# Patient Record
Sex: FEMALE | Race: WHITE | Employment: OTHER | URBAN - METROPOLITAN AREA
[De-identification: names, ages, dates, MRNs, and addresses within clinical notes are randomized per-mention and may not be internally consistent; named-entity substitution may affect disease eponyms.]

---

## 2017-03-15 ENCOUNTER — TELEPHONE (OUTPATIENT)
Dept: FAMILY MEDICINE CLINIC | Age: 62
End: 2017-03-15

## 2017-03-15 RX ORDER — RAMIPRIL 10 MG/1
CAPSULE ORAL
Qty: 30 CAPSULE | Refills: 5 | Status: SHIPPED | OUTPATIENT
Start: 2017-03-15 | End: 2017-09-12 | Stop reason: SDUPTHER

## 2017-03-24 RX ORDER — ALPRAZOLAM 0.5 MG/1
TABLET ORAL
Qty: 45 TABLET | Refills: 0 | OUTPATIENT
Start: 2017-03-24 | End: 2017-04-18 | Stop reason: SDUPTHER

## 2017-03-24 RX ORDER — ALPRAZOLAM 0.5 MG/1
TABLET ORAL
Qty: 45 TABLET | Refills: 0 | OUTPATIENT
Start: 2017-03-24

## 2017-03-24 RX ORDER — TRAMADOL HYDROCHLORIDE 50 MG/1
TABLET ORAL
Qty: 180 TABLET | Refills: 0 | OUTPATIENT
Start: 2017-03-24 | End: 2017-04-18 | Stop reason: SDUPTHER

## 2017-04-10 DIAGNOSIS — I10 ESSENTIAL HYPERTENSION: Primary | ICD-10-CM

## 2017-04-10 DIAGNOSIS — E16.2 HYPOGLYCEMIA: ICD-10-CM

## 2017-04-10 DIAGNOSIS — Z00.00 EXAMINATION, MEDICAL, GENERAL: ICD-10-CM

## 2017-04-12 RX ORDER — METOPROLOL SUCCINATE 25 MG/1
TABLET, EXTENDED RELEASE ORAL
Qty: 60 TABLET | Refills: 2 | Status: SHIPPED | OUTPATIENT
Start: 2017-04-12 | End: 2017-07-13 | Stop reason: SDUPTHER

## 2017-04-18 ENCOUNTER — OFFICE VISIT (OUTPATIENT)
Dept: FAMILY MEDICINE CLINIC | Age: 62
End: 2017-04-18

## 2017-04-18 VITALS
DIASTOLIC BLOOD PRESSURE: 86 MMHG | OXYGEN SATURATION: 97 % | HEART RATE: 75 BPM | SYSTOLIC BLOOD PRESSURE: 122 MMHG | WEIGHT: 193 LBS | BODY MASS INDEX: 28.92 KG/M2

## 2017-04-18 DIAGNOSIS — E78.5 HYPERLIPIDEMIA, UNSPECIFIED HYPERLIPIDEMIA TYPE: ICD-10-CM

## 2017-04-18 DIAGNOSIS — L98.9 SKIN LESION: ICD-10-CM

## 2017-04-18 DIAGNOSIS — I10 ESSENTIAL HYPERTENSION: Primary | ICD-10-CM

## 2017-04-18 DIAGNOSIS — G47.00 INSOMNIA, UNSPECIFIED TYPE: ICD-10-CM

## 2017-04-18 DIAGNOSIS — M79.7 FIBROMYALGIA: ICD-10-CM

## 2017-04-18 DIAGNOSIS — Z12.9 CANCER SCREENING: ICD-10-CM

## 2017-04-18 DIAGNOSIS — N95.9 MENOPAUSAL AND POSTMENOPAUSAL DISORDER: ICD-10-CM

## 2017-04-18 PROCEDURE — 99214 OFFICE O/P EST MOD 30 MIN: CPT | Performed by: FAMILY MEDICINE

## 2017-04-18 RX ORDER — TRAMADOL HYDROCHLORIDE 50 MG/1
TABLET ORAL
Qty: 180 TABLET | Refills: 0 | Status: SHIPPED | OUTPATIENT
Start: 2017-04-18 | End: 2017-05-15 | Stop reason: SDUPTHER

## 2017-04-18 RX ORDER — GABAPENTIN 300 MG/1
300 CAPSULE ORAL 3 TIMES DAILY
Qty: 270 CAPSULE | Refills: 3 | Status: SHIPPED | OUTPATIENT
Start: 2017-04-18 | End: 2018-07-10 | Stop reason: SDUPTHER

## 2017-04-18 RX ORDER — ALPRAZOLAM 0.5 MG/1
TABLET ORAL
Qty: 45 TABLET | Refills: 2 | Status: SHIPPED | OUTPATIENT
Start: 2017-04-18 | End: 2017-09-03 | Stop reason: SDUPTHER

## 2017-04-20 ENCOUNTER — TELEPHONE (OUTPATIENT)
Dept: FAMILY MEDICINE CLINIC | Age: 62
End: 2017-04-20

## 2017-05-15 DIAGNOSIS — M79.7 FIBROMYALGIA: ICD-10-CM

## 2017-05-15 RX ORDER — TRAMADOL HYDROCHLORIDE 50 MG/1
TABLET ORAL
Qty: 180 TABLET | Refills: 0 | OUTPATIENT
Start: 2017-05-15 | End: 2017-06-12 | Stop reason: SDUPTHER

## 2017-07-10 ENCOUNTER — TELEPHONE (OUTPATIENT)
Dept: OTHER | Facility: CLINIC | Age: 62
End: 2017-07-10

## 2017-07-13 RX ORDER — METOPROLOL SUCCINATE 25 MG/1
TABLET, EXTENDED RELEASE ORAL
Qty: 60 TABLET | Refills: 2 | Status: SHIPPED | OUTPATIENT
Start: 2017-07-13 | End: 2017-10-30 | Stop reason: SDUPTHER

## 2017-07-14 ENCOUNTER — TELEPHONE (OUTPATIENT)
Dept: FAMILY MEDICINE CLINIC | Age: 62
End: 2017-07-14

## 2017-07-14 RX ORDER — FUROSEMIDE 80 MG
TABLET ORAL
Qty: 30 TABLET | Refills: 11 | Status: SHIPPED | OUTPATIENT
Start: 2017-07-14 | End: 2018-07-06 | Stop reason: SDUPTHER

## 2017-07-14 RX ORDER — ATORVASTATIN CALCIUM 20 MG/1
TABLET, FILM COATED ORAL
Qty: 30 TABLET | Refills: 11 | Status: SHIPPED | OUTPATIENT
Start: 2017-07-14 | End: 2018-03-20

## 2017-08-01 ENCOUNTER — TELEPHONE (OUTPATIENT)
Dept: FAMILY MEDICINE CLINIC | Age: 62
End: 2017-08-01

## 2017-08-28 DIAGNOSIS — M79.7 FIBROMYALGIA: ICD-10-CM

## 2017-08-29 RX ORDER — TRAMADOL HYDROCHLORIDE 50 MG/1
TABLET ORAL
Qty: 180 TABLET | Refills: 0 | OUTPATIENT
Start: 2017-08-29 | End: 2017-09-28 | Stop reason: SDUPTHER

## 2017-09-12 RX ORDER — RAMIPRIL 10 MG/1
CAPSULE ORAL
Qty: 30 CAPSULE | Refills: 3 | Status: SHIPPED | OUTPATIENT
Start: 2017-09-12 | End: 2018-01-09 | Stop reason: SDUPTHER

## 2017-10-03 ENCOUNTER — TELEPHONE (OUTPATIENT)
Dept: FAMILY MEDICINE CLINIC | Age: 62
End: 2017-10-03

## 2017-10-03 NOTE — TELEPHONE ENCOUNTER
Please advise status    ALPRAZolam (XANAX) 0.5 MG tablet       traMADol (ULTRAM) 50 MG tablet       [facility] in chart

## 2017-10-05 ENCOUNTER — TELEPHONE (OUTPATIENT)
Dept: FAMILY MEDICINE CLINIC | Age: 62
End: 2017-10-05

## 2017-11-01 ENCOUNTER — TELEPHONE (OUTPATIENT)
Dept: FAMILY MEDICINE CLINIC | Age: 62
End: 2017-11-01

## 2017-11-01 NOTE — TELEPHONE ENCOUNTER
Patient called to request a refill of    traMADol (ULTRAM) 50 MG tablet [[id number]]    ALPRAZolam (XANAX) 0.5 MG tablet [[id number]]    Please call into [facility] on [address] in [place]. If she has to  the script, please call her at [phone]. You can leave a message, because works night shift and sleeps during the day.

## 2017-12-02 DIAGNOSIS — G47.00 INSOMNIA, UNSPECIFIED TYPE: ICD-10-CM

## 2017-12-02 DIAGNOSIS — M79.7 FIBROMYALGIA: ICD-10-CM

## 2017-12-04 ENCOUNTER — TELEPHONE (OUTPATIENT)
Dept: FAMILY MEDICINE CLINIC | Age: 62
End: 2017-12-04

## 2017-12-04 RX ORDER — ALPRAZOLAM 0.5 MG/1
TABLET ORAL
Qty: 45 TABLET | Refills: 0 | Status: SHIPPED | OUTPATIENT
Start: 2017-12-04 | End: 2018-01-02 | Stop reason: SDUPTHER

## 2017-12-04 RX ORDER — TRAMADOL HYDROCHLORIDE 50 MG/1
TABLET ORAL
Qty: 180 TABLET | Refills: 0 | Status: SHIPPED | OUTPATIENT
Start: 2017-12-04 | End: 2018-01-02 | Stop reason: SDUPTHER

## 2017-12-06 PROBLEM — F17.200 TOBACCO DEPENDENCE: Status: ACTIVE | Noted: 2017-12-06

## 2018-01-02 ENCOUNTER — TELEPHONE (OUTPATIENT)
Dept: FAMILY MEDICINE CLINIC | Age: 63
End: 2018-01-02

## 2018-01-02 DIAGNOSIS — G47.00 INSOMNIA, UNSPECIFIED TYPE: ICD-10-CM

## 2018-01-02 DIAGNOSIS — M79.7 FIBROMYALGIA: ICD-10-CM

## 2018-01-02 RX ORDER — TRAMADOL HYDROCHLORIDE 50 MG/1
TABLET ORAL
Qty: 180 TABLET | Refills: 0 | Status: CANCELLED | OUTPATIENT
Start: 2018-01-02

## 2018-01-02 RX ORDER — ALPRAZOLAM 0.5 MG/1
TABLET ORAL
Qty: 45 TABLET | Refills: 0 | Status: SHIPPED | OUTPATIENT
Start: 2018-01-02 | End: 2018-02-06 | Stop reason: SDUPTHER

## 2018-01-02 RX ORDER — ALPRAZOLAM 0.5 MG/1
TABLET ORAL
Qty: 45 TABLET | Refills: 0 | Status: CANCELLED | OUTPATIENT
Start: 2018-01-02

## 2018-01-02 RX ORDER — TRAMADOL HYDROCHLORIDE 50 MG/1
TABLET ORAL
Qty: 180 TABLET | Refills: 0 | Status: SHIPPED | OUTPATIENT
Start: 2018-01-02 | End: 2018-02-06 | Stop reason: SDUPTHER

## 2018-01-02 NOTE — TELEPHONE ENCOUNTER
traMADol (ULTRAM) 50 MG tablet 180 tablet 0 12/4/2017     Sig: TAKE 2 TABLETS BY MOUTH THREE TIMES DAILY AS NEEDED      ALPRAZolam (XANAX) 0.5 MG tablet 45 tablet 0 12/4/2017     Sig: TAKE 1/2 TABLET BY MOUTH EVERY MORNING AND 1 TABLET BY MOUTH EVERY EVENING      If can send please send to [facility] in chart

## 2018-01-05 ENCOUNTER — TELEPHONE (OUTPATIENT)
Dept: FAMILY MEDICINE CLINIC | Age: 63
End: 2018-01-05

## 2018-01-05 NOTE — TELEPHONE ENCOUNTER
Pt states [facility] will not fill her prescription    Pt is needing a prior authorization to be sent to [facility] for Tramadol 50mg tab

## 2018-02-02 ENCOUNTER — TELEPHONE (OUTPATIENT)
Dept: FAMILY MEDICINE CLINIC | Age: 63
End: 2018-02-02

## 2018-02-06 DIAGNOSIS — G47.00 INSOMNIA, UNSPECIFIED TYPE: ICD-10-CM

## 2018-02-06 DIAGNOSIS — M79.7 FIBROMYALGIA: ICD-10-CM

## 2018-02-06 RX ORDER — TRAMADOL HYDROCHLORIDE 50 MG/1
TABLET ORAL
Qty: 180 TABLET | Refills: 0 | Status: SHIPPED | OUTPATIENT
Start: 2018-02-06 | End: 2018-03-06 | Stop reason: SDUPTHER

## 2018-02-06 RX ORDER — ALPRAZOLAM 0.5 MG/1
TABLET ORAL
Qty: 45 TABLET | Refills: 0 | Status: SHIPPED | OUTPATIENT
Start: 2018-02-06 | End: 2018-03-06 | Stop reason: SDUPTHER

## 2018-03-05 ENCOUNTER — TELEPHONE (OUTPATIENT)
Dept: FAMILY MEDICINE CLINIC | Age: 63
End: 2018-03-05

## 2018-03-05 DIAGNOSIS — G47.00 INSOMNIA, UNSPECIFIED TYPE: ICD-10-CM

## 2018-03-05 DIAGNOSIS — M79.7 FIBROMYALGIA: ICD-10-CM

## 2018-03-05 NOTE — TELEPHONE ENCOUNTER
Patient requesting refills.   Patient has an appointment on 6/5/18    ALPRAZolam (XANAX) 0.5 MG tablet 45 tablet 0 2/6/2018 2/5/2019    Sig: TAKE 1/2 TABLET BY MOUTH EVERY MORNING AND 1 TABLET BY MOUTH EVERY EVENING      traMADol (ULTRAM) 50 MG tablet 180 tablet 0 2/6/2018 2/5/2019    Sig: TAKE 2 TABLETS BY MOUTH THREE TIMES DAILY AS NEEDED-  patient will need follow-up appointment for additional refills      Pharmacy:  [facility] [address] [place] [address] [phone]

## 2018-03-06 RX ORDER — TRAMADOL HYDROCHLORIDE 50 MG/1
TABLET ORAL
Qty: 180 TABLET | Refills: 0 | Status: SHIPPED | OUTPATIENT
Start: 2018-03-06 | End: 2018-04-10 | Stop reason: SDUPTHER

## 2018-03-06 RX ORDER — ALPRAZOLAM 0.5 MG/1
TABLET ORAL
Qty: 45 TABLET | Refills: 0 | Status: SHIPPED | OUTPATIENT
Start: 2018-03-06 | End: 2018-04-10 | Stop reason: SDUPTHER

## 2018-03-08 DIAGNOSIS — I10 HYPERTENSION, UNSPECIFIED TYPE: Primary | ICD-10-CM

## 2018-03-08 DIAGNOSIS — E78.5 HYPERLIPIDEMIA, UNSPECIFIED HYPERLIPIDEMIA TYPE: ICD-10-CM

## 2018-03-20 ENCOUNTER — OFFICE VISIT (OUTPATIENT)
Dept: FAMILY MEDICINE CLINIC | Age: 63
End: 2018-03-20

## 2018-03-20 VITALS
WEIGHT: 188 LBS | SYSTOLIC BLOOD PRESSURE: 128 MMHG | DIASTOLIC BLOOD PRESSURE: 82 MMHG | BODY MASS INDEX: 28.49 KG/M2 | HEIGHT: 68 IN | HEART RATE: 64 BPM | OXYGEN SATURATION: 97 %

## 2018-03-20 DIAGNOSIS — M54.50 CHRONIC LOW BACK PAIN WITHOUT SCIATICA, UNSPECIFIED BACK PAIN LATERALITY: ICD-10-CM

## 2018-03-20 DIAGNOSIS — M79.7 FIBROMYALGIA: ICD-10-CM

## 2018-03-20 DIAGNOSIS — I10 HYPERTENSION, UNSPECIFIED TYPE: Primary | ICD-10-CM

## 2018-03-20 DIAGNOSIS — G89.29 CHRONIC LOW BACK PAIN WITHOUT SCIATICA, UNSPECIFIED BACK PAIN LATERALITY: ICD-10-CM

## 2018-03-20 DIAGNOSIS — F17.200 TOBACCO DEPENDENCE: ICD-10-CM

## 2018-03-20 DIAGNOSIS — M54.9 MID BACK PAIN: ICD-10-CM

## 2018-03-20 DIAGNOSIS — M54.2 NECK PAIN: ICD-10-CM

## 2018-03-20 PROCEDURE — 99214 OFFICE O/P EST MOD 30 MIN: CPT | Performed by: FAMILY MEDICINE

## 2018-03-20 RX ORDER — BUPROPION HYDROCHLORIDE 150 MG/1
TABLET, EXTENDED RELEASE ORAL
Qty: 60 TABLET | Refills: 2 | Status: SHIPPED | OUTPATIENT
Start: 2018-03-20 | End: 2018-06-23 | Stop reason: SDUPTHER

## 2018-03-20 ASSESSMENT — ENCOUNTER SYMPTOMS
COUGH: 0
SHORTNESS OF BREATH: 0
BACK PAIN: 1

## 2018-03-20 NOTE — PROGRESS NOTES
Deidra Whitt  : 1955  Encounter date: 3/20/2018    This is a 60 y.o. female who presents to [facility] care. Chief Complaint   Patient presents with    3 Month Follow-Up     Patient has not taken atorvastatin for last month because of leg cramps. 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 Is on feet all the time. Also works at [employer] with less walking around which is easier on her. Leg cramps bother her. Initially thought from her activity level. Didn't go away with less activity. Intense pain that wakes her from sleep. Rubs aspercreme on legs which helps. Does have varicose veins, wears support stockings. Works 3 days a week. Backed off lipitor and cramps went away; came back when she restarted. Stopped lipitor about a month ago. Works night shift; she is good about staying hydrated. When she started tramadol it was from rheum for fibromyalgia for pain that \"moves\". Can start in back and radiate up or shoulders and radiate down. Had been getting worse through years. \"I don't want to take narcotics because then I can't work\". Was dx initially as having gout (initially elbows both swollen). Mostly pain is in back. Pain is sharp -like someone is stabbing her. Takes 2 tramadol when she takes it. Takes it along with tylenol about TID when she is working and spaces 6-8 hours apart. Gabapentin seems to help with pain in her neck. Helps with fingers tingling. Saw neurosurg and told her it wasn't surgical. Has done PT for this. Seems to be better as long as she is staying busy. Lifting, pulling she just uses caution to not worsen this. Tramadol eases pain. Has had injections in the past as well. Does feel like upper back has worsened in the last couple of years. At worst pain is 8/10. Tramadol brings it to 3-4/10. Helps her be able to function; do things around house; complete her job. Pain all worse in certain rainy weather/pressure changes.  (has History   Problem Relation Age of Onset    Diabetes Mother          Review of Systems   Constitutional: Negative for chills, fatigue and fever. Respiratory: Negative for cough and shortness of breath. Musculoskeletal: Positive for arthralgias, back pain, neck pain and neck stiffness. Neurological: Positive for numbness. Psychiatric/Behavioral: The patient is nervous/anxious. Objective:    /82 (Site: Left Arm, Position: Sitting, Cuff Size: Large Adult)   Pulse 64   Ht 5' 8\" (1.727 m)   Wt 188 lb (85.3 kg)   SpO2 97%   BMI 28.59 kg/m²   Weight: 188 lb (85.3 kg)     BP Readings from Last 3 Encounters:   03/20/18 128/82   12/06/17 (!) 164/88   10/05/17 130/80     Wt Readings from Last 3 Encounters:   03/20/18 188 lb (85.3 kg)   12/06/17 189 lb (85.7 kg)   08/01/17 193 lb (87.5 kg)       Physical Exam   Constitutional: She is oriented to person, place, and time. She appears well-developed and well-nourished. Neck: Neck supple. No thyromegaly present. Cardiovascular: Normal rate, regular rhythm and normal heart sounds. No murmur heard. 2+pedal pulses; no LE edema   Pulmonary/Chest: Effort normal. No respiratory distress. She has decreased breath sounds. She has no wheezes. She has no rales. Abdominal: Soft. Bowel sounds are normal.   Musculoskeletal:        Cervical back: She exhibits bony tenderness. Thoracic back: She exhibits bony tenderness. Lumbar back: She exhibits spasm. Lymphadenopathy:     She has no cervical adenopathy. Neurological: She is alert and oriented to person, place, and time. Reflex Scores:       Tricep reflexes are 2+ on the right side and 2+ on the left side. Bicep reflexes are 2+ on the right side and 2+ on the left side. Brachioradialis reflexes are 2+ on the right side and 2+ on the left side. Patellar reflexes are 2+ on the right side and 2+ on the left side.        Achilles reflexes are 2+ on the right side and 2+ on the

## 2018-03-22 DIAGNOSIS — E78.00 ELEVATED CHOLESTEROL: Primary | ICD-10-CM

## 2018-03-22 RX ORDER — ATORVASTATIN CALCIUM 20 MG/1
20 TABLET, FILM COATED ORAL DAILY
Qty: 30 TABLET | Refills: 3 | Status: SHIPPED | OUTPATIENT
Start: 2018-03-22 | End: 2018-06-05 | Stop reason: SINTOL

## 2018-03-26 ENCOUNTER — TELEPHONE (OUTPATIENT)
Dept: FAMILY MEDICINE CLINIC | Age: 63
End: 2018-03-26

## 2018-03-26 NOTE — TELEPHONE ENCOUNTER
D/c Lipitor. Change to pravastatin 20mg daily. #30 with 3 refills until we see if this will improve muscle cramps.

## 2018-03-26 NOTE — TELEPHONE ENCOUNTER
atorvastatin (LIPITOR) 20 MG tablet 30 tablet 3 3/22/2018     Sig - Route: Take 1 tablet by mouth daily - Oral      Above med was sent in error pt was advised it was going to be changed as below     Notes Recorded by Maggi Campbell MD on 3/22/2018 at 8:17 AM EDT  Cholesterol has doubled in the month that she has stopped her lipitor.  If she would like to try pravastatin as we discussed, she could try at 20mg dose to start and see if she restarts with cramping (then I wouldn't try statins again) OR; we can revisit discussion in 3 months at her next C[facility].            [facility] in chart     Please cancel and send new script

## 2018-03-26 NOTE — TELEPHONE ENCOUNTER
Patient returned call advised of notes below. Confirmed pharmacy with patient as [facility] in chart. RX for Pravastatin 20 mg daily, #30 3 refills sent to pharmacy.

## 2018-03-29 RX ORDER — PRAVASTATIN SODIUM 20 MG
20 TABLET ORAL EVERY EVENING
Qty: 30 TABLET | Refills: 3 | Status: SHIPPED | OUTPATIENT
Start: 2018-03-29 | End: 2018-07-22 | Stop reason: SDUPTHER

## 2018-04-10 DIAGNOSIS — G47.00 INSOMNIA, UNSPECIFIED TYPE: ICD-10-CM

## 2018-04-10 DIAGNOSIS — M79.7 FIBROMYALGIA: ICD-10-CM

## 2018-04-13 RX ORDER — ALPRAZOLAM 0.5 MG/1
TABLET ORAL
Qty: 45 TABLET | Refills: 0 | Status: SHIPPED | OUTPATIENT
Start: 2018-04-13 | End: 2018-05-15 | Stop reason: SDUPTHER

## 2018-04-13 RX ORDER — TRAMADOL HYDROCHLORIDE 50 MG/1
TABLET ORAL
Qty: 180 TABLET | Refills: 0 | Status: SHIPPED | OUTPATIENT
Start: 2018-04-13 | End: 2018-05-15 | Stop reason: SDUPTHER

## 2018-05-15 ENCOUNTER — TELEPHONE (OUTPATIENT)
Dept: FAMILY MEDICINE CLINIC | Age: 63
End: 2018-05-15

## 2018-05-15 DIAGNOSIS — M79.7 FIBROMYALGIA: ICD-10-CM

## 2018-05-15 DIAGNOSIS — G47.00 INSOMNIA, UNSPECIFIED TYPE: ICD-10-CM

## 2018-05-15 RX ORDER — TRAMADOL HYDROCHLORIDE 50 MG/1
TABLET ORAL
Qty: 180 TABLET | Refills: 0 | Status: SHIPPED | OUTPATIENT
Start: 2018-05-15 | End: 2018-06-13 | Stop reason: SDUPTHER

## 2018-05-15 RX ORDER — ALPRAZOLAM 0.5 MG/1
TABLET ORAL
Qty: 45 TABLET | Refills: 0 | Status: SHIPPED | OUTPATIENT
Start: 2018-05-15 | End: 2018-06-13 | Stop reason: SDUPTHER

## 2018-06-05 ENCOUNTER — OFFICE VISIT (OUTPATIENT)
Dept: FAMILY MEDICINE CLINIC | Age: 63
End: 2018-06-05

## 2018-06-05 VITALS
SYSTOLIC BLOOD PRESSURE: 138 MMHG | DIASTOLIC BLOOD PRESSURE: 78 MMHG | BODY MASS INDEX: 29.07 KG/M2 | HEART RATE: 64 BPM | WEIGHT: 191.2 LBS | OXYGEN SATURATION: 96 %

## 2018-06-05 DIAGNOSIS — E78.5 HYPERLIPIDEMIA, UNSPECIFIED HYPERLIPIDEMIA TYPE: ICD-10-CM

## 2018-06-05 DIAGNOSIS — M54.40 CHRONIC LOW BACK PAIN WITH SCIATICA, SCIATICA LATERALITY UNSPECIFIED, UNSPECIFIED BACK PAIN LATERALITY: Primary | Chronic | ICD-10-CM

## 2018-06-05 DIAGNOSIS — G89.29 CHRONIC LOW BACK PAIN WITH SCIATICA, SCIATICA LATERALITY UNSPECIFIED, UNSPECIFIED BACK PAIN LATERALITY: Primary | Chronic | ICD-10-CM

## 2018-06-05 DIAGNOSIS — Z12.31 ENCOUNTER FOR SCREENING MAMMOGRAM FOR BREAST CANCER: ICD-10-CM

## 2018-06-05 DIAGNOSIS — Z78.0 POST-MENOPAUSAL: ICD-10-CM

## 2018-06-05 DIAGNOSIS — M19.91 PRIMARY OSTEOARTHRITIS, UNSPECIFIED SITE: ICD-10-CM

## 2018-06-05 DIAGNOSIS — F17.200 TOBACCO DEPENDENCE: ICD-10-CM

## 2018-06-05 DIAGNOSIS — I10 HYPERTENSION, UNSPECIFIED TYPE: ICD-10-CM

## 2018-06-05 DIAGNOSIS — Z23 NEED FOR TDAP VACCINATION: ICD-10-CM

## 2018-06-05 PROCEDURE — 99214 OFFICE O/P EST MOD 30 MIN: CPT | Performed by: FAMILY MEDICINE

## 2018-06-05 PROCEDURE — 90715 TDAP VACCINE 7 YRS/> IM: CPT | Performed by: FAMILY MEDICINE

## 2018-06-05 PROCEDURE — 90471 IMMUNIZATION ADMIN: CPT | Performed by: FAMILY MEDICINE

## 2018-06-05 RX ORDER — NICOTINE 21 MG/24HR
1 PATCH, TRANSDERMAL 24 HOURS TRANSDERMAL EVERY 24 HOURS
Qty: 30 PATCH | Refills: 0 | Status: SHIPPED | OUTPATIENT
Start: 2018-06-05 | End: 2018-09-06

## 2018-06-05 RX ORDER — BUPROPION HYDROCHLORIDE 150 MG/1
150 TABLET, EXTENDED RELEASE ORAL 2 TIMES DAILY
Qty: 60 TABLET | Refills: 2 | Status: SHIPPED | OUTPATIENT
Start: 2018-06-05 | End: 2019-07-03

## 2018-06-05 ASSESSMENT — PATIENT HEALTH QUESTIONNAIRE - PHQ9
SUM OF ALL RESPONSES TO PHQ9 QUESTIONS 1 & 2: 0
SUM OF ALL RESPONSES TO PHQ QUESTIONS 1-9: 0
1. LITTLE INTEREST OR PLEASURE IN DOING THINGS: 0
2. FEELING DOWN, DEPRESSED OR HOPELESS: 0

## 2018-06-05 ASSESSMENT — ENCOUNTER SYMPTOMS
SHORTNESS OF BREATH: 0
COUGH: 0
BACK PAIN: 1

## 2018-06-13 DIAGNOSIS — G47.00 INSOMNIA, UNSPECIFIED TYPE: ICD-10-CM

## 2018-06-13 DIAGNOSIS — M79.7 FIBROMYALGIA: ICD-10-CM

## 2018-06-14 RX ORDER — TRAMADOL HYDROCHLORIDE 50 MG/1
TABLET ORAL
Qty: 180 TABLET | Refills: 0 | Status: SHIPPED | OUTPATIENT
Start: 2018-06-14 | End: 2018-07-18 | Stop reason: SDUPTHER

## 2018-06-14 RX ORDER — ALPRAZOLAM 0.5 MG/1
TABLET ORAL
Qty: 45 TABLET | Refills: 0 | Status: SHIPPED | OUTPATIENT
Start: 2018-06-14 | End: 2018-07-18 | Stop reason: SDUPTHER

## 2018-06-26 RX ORDER — BUPROPION HYDROCHLORIDE 150 MG/1
TABLET, EXTENDED RELEASE ORAL
Qty: 60 TABLET | Refills: 2 | Status: SHIPPED | OUTPATIENT
Start: 2018-06-26 | End: 2018-10-08 | Stop reason: SDUPTHER

## 2018-07-10 DIAGNOSIS — M79.7 FIBROMYALGIA: ICD-10-CM

## 2018-07-11 RX ORDER — GABAPENTIN 300 MG/1
300 CAPSULE ORAL 3 TIMES DAILY
Qty: 270 CAPSULE | Refills: 3 | Status: SHIPPED | OUTPATIENT
Start: 2018-07-11 | End: 2019-06-25 | Stop reason: SDUPTHER

## 2018-07-18 DIAGNOSIS — M79.7 FIBROMYALGIA: ICD-10-CM

## 2018-07-18 DIAGNOSIS — G47.00 INSOMNIA, UNSPECIFIED TYPE: ICD-10-CM

## 2018-07-19 RX ORDER — TRAMADOL HYDROCHLORIDE 50 MG/1
TABLET ORAL
Qty: 180 TABLET | Refills: 0 | Status: SHIPPED | OUTPATIENT
Start: 2018-07-19 | End: 2018-08-21 | Stop reason: SDUPTHER

## 2018-07-19 RX ORDER — ALPRAZOLAM 0.5 MG/1
TABLET ORAL
Qty: 45 TABLET | Refills: 0 | Status: SHIPPED | OUTPATIENT
Start: 2018-07-19 | End: 2018-08-21 | Stop reason: SDUPTHER

## 2018-07-23 RX ORDER — PRAVASTATIN SODIUM 20 MG
20 TABLET ORAL EVERY EVENING
Qty: 30 TABLET | Refills: 0 | Status: SHIPPED | OUTPATIENT
Start: 2018-07-23 | End: 2018-10-22 | Stop reason: SDUPTHER

## 2018-08-13 RX ORDER — PRAVASTATIN SODIUM 20 MG
20 TABLET ORAL EVERY EVENING
Qty: 30 TABLET | Refills: 1 | Status: SHIPPED | OUTPATIENT
Start: 2018-08-13 | End: 2018-10-09 | Stop reason: SDUPTHER

## 2018-08-21 DIAGNOSIS — G47.00 INSOMNIA, UNSPECIFIED TYPE: ICD-10-CM

## 2018-08-21 DIAGNOSIS — M79.7 FIBROMYALGIA: ICD-10-CM

## 2018-08-21 RX ORDER — TRAMADOL HYDROCHLORIDE 50 MG/1
TABLET ORAL
Qty: 180 TABLET | Refills: 0 | Status: SHIPPED | OUTPATIENT
Start: 2018-08-21 | End: 2018-09-25 | Stop reason: SDUPTHER

## 2018-08-21 RX ORDER — ALPRAZOLAM 0.5 MG/1
TABLET ORAL
Qty: 45 TABLET | Refills: 0 | Status: SHIPPED | OUTPATIENT
Start: 2018-08-21 | End: 2018-09-25 | Stop reason: SDUPTHER

## 2018-08-21 NOTE — TELEPHONE ENCOUNTER
traMADol (ULTRAM) 50 MG tablet 180 tablet 0 7/19/2018 7/13/2019    Sig: TAKE 2 TABLETS BY MOUTH THREE TIMES DAILY AS NEEDED-  patient will need follow-up appointment for additional refills.       ALPRAZolam (XANAX) 0.5 MG tablet 45 tablet 0 7/19/2018 7/13/2019    Sig: TAKE 1/2 TABLET BY MOUTH EVERY MORNING AND 1 TABLET BY MOUTH EVERY EVENING      [facility] nevarez in chart

## 2018-09-06 ENCOUNTER — OFFICE VISIT (OUTPATIENT)
Dept: FAMILY MEDICINE CLINIC | Age: 63
End: 2018-09-06

## 2018-09-06 VITALS
SYSTOLIC BLOOD PRESSURE: 118 MMHG | DIASTOLIC BLOOD PRESSURE: 68 MMHG | HEART RATE: 69 BPM | WEIGHT: 189 LBS | OXYGEN SATURATION: 95 % | BODY MASS INDEX: 28.74 KG/M2

## 2018-09-06 DIAGNOSIS — F41.9 ANXIETY: ICD-10-CM

## 2018-09-06 DIAGNOSIS — G47.09 OTHER INSOMNIA: ICD-10-CM

## 2018-09-06 DIAGNOSIS — I10 HYPERTENSION, UNSPECIFIED TYPE: ICD-10-CM

## 2018-09-06 DIAGNOSIS — M54.40 CHRONIC LOW BACK PAIN WITH SCIATICA, SCIATICA LATERALITY UNSPECIFIED, UNSPECIFIED BACK PAIN LATERALITY: Primary | Chronic | ICD-10-CM

## 2018-09-06 DIAGNOSIS — G89.29 CHRONIC LOW BACK PAIN WITH SCIATICA, SCIATICA LATERALITY UNSPECIFIED, UNSPECIFIED BACK PAIN LATERALITY: Primary | Chronic | ICD-10-CM

## 2018-09-06 PROCEDURE — 99214 OFFICE O/P EST MOD 30 MIN: CPT | Performed by: FAMILY MEDICINE

## 2018-09-06 RX ORDER — CYCLOBENZAPRINE HCL 5 MG
5 TABLET ORAL NIGHTLY PRN
Qty: 30 TABLET | Refills: 2 | Status: SHIPPED | OUTPATIENT
Start: 2018-09-06 | End: 2018-09-16

## 2018-09-06 ASSESSMENT — ENCOUNTER SYMPTOMS
SHORTNESS OF BREATH: 0
CHEST TIGHTNESS: 0
BACK PAIN: 1

## 2018-09-25 DIAGNOSIS — M79.7 FIBROMYALGIA: ICD-10-CM

## 2018-09-25 DIAGNOSIS — G47.00 INSOMNIA, UNSPECIFIED TYPE: ICD-10-CM

## 2018-09-25 DIAGNOSIS — M19.90 ARTHRITIS: Primary | ICD-10-CM

## 2018-09-25 RX ORDER — ALPRAZOLAM 0.5 MG/1
TABLET ORAL
Qty: 45 TABLET | Refills: 0 | Status: SHIPPED | OUTPATIENT
Start: 2018-09-25 | End: 2018-10-22 | Stop reason: SDUPTHER

## 2018-09-25 RX ORDER — TRAMADOL HYDROCHLORIDE 50 MG/1
100 TABLET ORAL EVERY 8 HOURS PRN
Qty: 180 TABLET | Refills: 0 | Status: SHIPPED | OUTPATIENT
Start: 2018-09-25 | End: 2018-10-22 | Stop reason: SDUPTHER

## 2018-09-25 NOTE — TELEPHONE ENCOUNTER
Medication:   Requested Prescriptions     Pending Prescriptions Disp Refills    ALPRAZolam (XANAX) 0.5 MG tablet 45 tablet 0     Sig: TAKE 1/2 TABLET BY MOUTH EVERY MORNING AND 1 TABLET BY MOUTH EVERY EVENING.  traMADol (ULTRAM) 50 MG tablet 180 tablet 0     Sig: TAKE 2 TABLETS BY MOUTH THREE TIMES DAILY AS NEEDED-  patient will need follow-up appointment for additional refills. Last Filled:  8/21/2018    Patient Phone Number: [phone] (home) [phone] (work)    Last appt: 9/6/2018   Next appt: 12/7/2018    Last OARRS:   RX Monitoring 8/21/2018   Attestation The Prescription Monitoring Report for this patient was reviewed today.    Documentation -       Preferred Pharmacy:   [facility] [address] [place], [place] [phone] [facility] [phone]  [facility] [address]  Phone: [phone] Fax: [phone]

## 2018-09-26 ENCOUNTER — HOSPITAL ENCOUNTER (OUTPATIENT)
Dept: GENERAL RADIOLOGY | Age: 63
Discharge: HOME | End: 2018-09-26
Payer: COMMERCIAL

## 2018-09-26 ENCOUNTER — HOSPITAL ENCOUNTER (OUTPATIENT)
Age: 63
Discharge: HOME | End: 2018-09-26
Payer: COMMERCIAL

## 2018-09-26 DIAGNOSIS — M54.2 NECK PAIN: ICD-10-CM

## 2018-09-26 DIAGNOSIS — M54.50 CHRONIC LOW BACK PAIN WITHOUT SCIATICA, UNSPECIFIED BACK PAIN LATERALITY: ICD-10-CM

## 2018-09-26 DIAGNOSIS — G89.29 CHRONIC LOW BACK PAIN WITHOUT SCIATICA, UNSPECIFIED BACK PAIN LATERALITY: ICD-10-CM

## 2018-09-26 DIAGNOSIS — M54.9 MID BACK PAIN: ICD-10-CM

## 2018-09-26 PROCEDURE — 72072 X-RAY EXAM THORAC SPINE 3VWS: CPT

## 2018-09-26 PROCEDURE — 72100 X-RAY EXAM L-S SPINE 2/3 VWS: CPT

## 2018-09-26 PROCEDURE — 72040 X-RAY EXAM NECK SPINE 2-3 VW: CPT

## 2018-10-09 RX ORDER — PRAVASTATIN SODIUM 20 MG
20 TABLET ORAL EVERY EVENING
Qty: 30 TABLET | Refills: 5 | Status: SHIPPED | OUTPATIENT
Start: 2018-10-09 | End: 2018-10-22 | Stop reason: SDUPTHER

## 2018-10-09 RX ORDER — BUPROPION HYDROCHLORIDE 150 MG/1
TABLET, EXTENDED RELEASE ORAL
Qty: 60 TABLET | Refills: 0 | Status: SHIPPED | OUTPATIENT
Start: 2018-10-09 | End: 2018-10-22 | Stop reason: SDUPTHER

## 2018-10-22 DIAGNOSIS — G47.00 INSOMNIA, UNSPECIFIED TYPE: ICD-10-CM

## 2018-10-22 DIAGNOSIS — M79.7 FIBROMYALGIA: ICD-10-CM

## 2018-10-22 DIAGNOSIS — M19.90 ARTHRITIS: ICD-10-CM

## 2018-10-22 RX ORDER — ALPRAZOLAM 0.5 MG/1
TABLET ORAL
Qty: 45 TABLET | Refills: 0 | Status: SHIPPED | OUTPATIENT
Start: 2018-10-22 | End: 2018-11-20 | Stop reason: SDUPTHER

## 2018-10-22 RX ORDER — BUPROPION HYDROCHLORIDE 150 MG/1
TABLET, EXTENDED RELEASE ORAL
Qty: 60 TABLET | Refills: 2 | Status: SHIPPED | OUTPATIENT
Start: 2018-10-22 | End: 2019-01-24 | Stop reason: SDUPTHER

## 2018-10-22 RX ORDER — PRAVASTATIN SODIUM 20 MG
20 TABLET ORAL EVERY EVENING
Qty: 30 TABLET | Refills: 5 | Status: SHIPPED | OUTPATIENT
Start: 2018-10-22 | End: 2019-04-10 | Stop reason: SDUPTHER

## 2018-10-22 RX ORDER — TRAMADOL HYDROCHLORIDE 50 MG/1
100 TABLET ORAL EVERY 8 HOURS PRN
Qty: 180 TABLET | Refills: 0 | Status: SHIPPED | OUTPATIENT
Start: 2018-10-22 | End: 2018-11-20 | Stop reason: SDUPTHER

## 2018-10-22 NOTE — TELEPHONE ENCOUNTER
Medication:   Requested Prescriptions      No prescriptions requested or ordered in this encounter      Last Filled:  9/25/18    Patient Phone Number: [phone] (home) [phone] (work)    Last appt: 9/6/18  Next appt: 12/7/2018    Last OARRS:   RX Monitoring 9/25/2018   Attestation The Prescription Monitoring Report for this patient was reviewed today.    Documentation -       [facility] Pharmacy:   [facility] [address] [place] [address] [phone] [address] [address]  Phone: [phone] Fax: [phone]    [facility] [address],[place] [address]  Phone: [phone] Fax: [phone]

## 2018-10-22 NOTE — TELEPHONE ENCOUNTER
ALPRAZolam (XANAX) 0.5 MG tablet 45 tablet 0 9/25/2018 9/19/2019    Sig: TAKE 1/2 TABLET BY MOUTH EVERY MORNING AND 1 TABLET BY MOUTH EVERY EVENING      traMADol (ULTRAM) 50 MG tablet 180 tablet 0 9/25/2018 10/25/2018    Sig - Route: Take 2 tablets by mouth every 8 hours as needed for Pain for up to 30 days. . - Oral      Pharmacy:  [facility] [address] [place] [address] [phone] - f [phone]      Please advise

## 2018-11-20 DIAGNOSIS — M19.90 ARTHRITIS: ICD-10-CM

## 2018-11-20 DIAGNOSIS — M79.7 FIBROMYALGIA: ICD-10-CM

## 2018-11-20 DIAGNOSIS — G47.00 INSOMNIA, UNSPECIFIED TYPE: ICD-10-CM

## 2018-11-21 RX ORDER — TRAMADOL HYDROCHLORIDE 50 MG/1
100 TABLET ORAL EVERY 8 HOURS PRN
Qty: 180 TABLET | Refills: 0 | Status: SHIPPED | OUTPATIENT
Start: 2018-11-21 | End: 2018-12-20 | Stop reason: SDUPTHER

## 2018-11-21 RX ORDER — ALPRAZOLAM 0.5 MG/1
TABLET ORAL
Qty: 45 TABLET | Refills: 0 | Status: SHIPPED | OUTPATIENT
Start: 2018-11-21 | End: 2018-12-20 | Stop reason: SDUPTHER

## 2018-12-07 ENCOUNTER — OFFICE VISIT (OUTPATIENT)
Dept: FAMILY MEDICINE CLINIC | Age: 63
End: 2018-12-07
Payer: COMMERCIAL

## 2018-12-07 VITALS
HEART RATE: 66 BPM | BODY MASS INDEX: 28.59 KG/M2 | SYSTOLIC BLOOD PRESSURE: 128 MMHG | OXYGEN SATURATION: 97 % | WEIGHT: 188 LBS | DIASTOLIC BLOOD PRESSURE: 82 MMHG

## 2018-12-07 DIAGNOSIS — G47.09 OTHER INSOMNIA: ICD-10-CM

## 2018-12-07 DIAGNOSIS — M54.40 CHRONIC LOW BACK PAIN WITH SCIATICA, SCIATICA LATERALITY UNSPECIFIED, UNSPECIFIED BACK PAIN LATERALITY: Primary | Chronic | ICD-10-CM

## 2018-12-07 DIAGNOSIS — F17.200 TOBACCO DEPENDENCE: ICD-10-CM

## 2018-12-07 DIAGNOSIS — I10 HYPERTENSION, UNSPECIFIED TYPE: ICD-10-CM

## 2018-12-07 DIAGNOSIS — G89.29 CHRONIC LOW BACK PAIN WITH SCIATICA, SCIATICA LATERALITY UNSPECIFIED, UNSPECIFIED BACK PAIN LATERALITY: Primary | Chronic | ICD-10-CM

## 2018-12-07 PROCEDURE — 99214 OFFICE O/P EST MOD 30 MIN: CPT | Performed by: FAMILY MEDICINE

## 2018-12-07 ASSESSMENT — ENCOUNTER SYMPTOMS
SHORTNESS OF BREATH: 0
COUGH: 0

## 2018-12-20 DIAGNOSIS — G47.00 INSOMNIA, UNSPECIFIED TYPE: ICD-10-CM

## 2018-12-20 DIAGNOSIS — M19.90 ARTHRITIS: ICD-10-CM

## 2018-12-20 DIAGNOSIS — M79.7 FIBROMYALGIA: ICD-10-CM

## 2018-12-20 RX ORDER — ALPRAZOLAM 0.5 MG/1
TABLET ORAL
Qty: 45 TABLET | Refills: 0 | Status: SHIPPED | OUTPATIENT
Start: 2018-12-20 | End: 2019-01-23 | Stop reason: SDUPTHER

## 2018-12-20 RX ORDER — TRAMADOL HYDROCHLORIDE 50 MG/1
100 TABLET ORAL EVERY 8 HOURS PRN
Qty: 180 TABLET | Refills: 0 | Status: SHIPPED | OUTPATIENT
Start: 2018-12-20 | End: 2019-01-23 | Stop reason: SDUPTHER

## 2019-01-23 DIAGNOSIS — M19.90 ARTHRITIS: ICD-10-CM

## 2019-01-23 DIAGNOSIS — M79.7 FIBROMYALGIA: ICD-10-CM

## 2019-01-23 DIAGNOSIS — G47.00 INSOMNIA, UNSPECIFIED TYPE: ICD-10-CM

## 2019-01-23 RX ORDER — ALPRAZOLAM 0.5 MG/1
TABLET ORAL
Qty: 45 TABLET | Refills: 0 | Status: SHIPPED | OUTPATIENT
Start: 2019-01-23 | End: 2019-02-26 | Stop reason: SDUPTHER

## 2019-01-23 RX ORDER — TRAMADOL HYDROCHLORIDE 50 MG/1
100 TABLET ORAL EVERY 8 HOURS PRN
Qty: 180 TABLET | Refills: 0 | Status: SHIPPED | OUTPATIENT
Start: 2019-01-23 | End: 2019-02-22

## 2019-01-24 RX ORDER — BUPROPION HYDROCHLORIDE 150 MG/1
TABLET, EXTENDED RELEASE ORAL
Qty: 60 TABLET | Refills: 2 | OUTPATIENT
Start: 2019-01-24 | End: 2019-02-19

## 2019-02-19 ENCOUNTER — OFFICE VISIT (OUTPATIENT)
Dept: FAMILY MEDICINE CLINIC | Age: 64
End: 2019-02-19
Payer: COMMERCIAL

## 2019-02-19 VITALS
SYSTOLIC BLOOD PRESSURE: 112 MMHG | BODY MASS INDEX: 28.22 KG/M2 | DIASTOLIC BLOOD PRESSURE: 72 MMHG | OXYGEN SATURATION: 96 % | HEART RATE: 62 BPM | WEIGHT: 185.6 LBS

## 2019-02-19 DIAGNOSIS — K35.80 ACUTE APPENDICITIS, UNSPECIFIED ACUTE APPENDICITIS TYPE: Primary | ICD-10-CM

## 2019-02-19 PROCEDURE — 99213 OFFICE O/P EST LOW 20 MIN: CPT | Performed by: INTERNAL MEDICINE

## 2019-02-19 RX ORDER — CYCLOBENZAPRINE HCL 5 MG
2.5 TABLET ORAL 3 TIMES DAILY PRN
COMMUNITY
End: 2019-05-29

## 2019-02-19 RX ORDER — ACETAMINOPHEN 500 MG
1000 TABLET ORAL PRN
COMMUNITY

## 2019-02-19 ASSESSMENT — PATIENT HEALTH QUESTIONNAIRE - PHQ9
1. LITTLE INTEREST OR PLEASURE IN DOING THINGS: 0
SUM OF ALL RESPONSES TO PHQ9 QUESTIONS 1 & 2: 0
SUM OF ALL RESPONSES TO PHQ QUESTIONS 1-9: 0
SUM OF ALL RESPONSES TO PHQ QUESTIONS 1-9: 0
2. FEELING DOWN, DEPRESSED OR HOPELESS: 0

## 2019-02-25 DIAGNOSIS — M19.90 ARTHRITIS: ICD-10-CM

## 2019-02-25 DIAGNOSIS — M79.7 FIBROMYALGIA: ICD-10-CM

## 2019-02-25 RX ORDER — TRAMADOL HYDROCHLORIDE 50 MG/1
100 TABLET ORAL EVERY 8 HOURS PRN
Qty: 180 TABLET | Refills: 0 | Status: SHIPPED | OUTPATIENT
Start: 2019-02-25 | End: 2019-03-27 | Stop reason: SDUPTHER

## 2019-02-26 DIAGNOSIS — G47.00 INSOMNIA, UNSPECIFIED TYPE: ICD-10-CM

## 2019-02-27 RX ORDER — ALPRAZOLAM 0.5 MG/1
TABLET ORAL
Qty: 45 TABLET | Refills: 2 | Status: SHIPPED | OUTPATIENT
Start: 2019-02-27 | End: 2019-03-27 | Stop reason: SDUPTHER

## 2019-03-26 ENCOUNTER — TELEPHONE (OUTPATIENT)
Dept: FAMILY MEDICINE CLINIC | Age: 64
End: 2019-03-26

## 2019-03-26 DIAGNOSIS — G47.00 INSOMNIA, UNSPECIFIED TYPE: ICD-10-CM

## 2019-03-26 DIAGNOSIS — M19.90 ARTHRITIS: ICD-10-CM

## 2019-03-26 DIAGNOSIS — M79.7 FIBROMYALGIA: ICD-10-CM

## 2019-03-27 RX ORDER — ALPRAZOLAM 0.5 MG/1
TABLET ORAL
Qty: 45 TABLET | Refills: 2 | Status: SHIPPED | OUTPATIENT
Start: 2019-03-27 | End: 2019-04-26

## 2019-03-27 RX ORDER — TRAMADOL HYDROCHLORIDE 50 MG/1
100 TABLET ORAL EVERY 8 HOURS PRN
Qty: 180 TABLET | Refills: 0 | Status: SHIPPED | OUTPATIENT
Start: 2019-03-27 | End: 2019-04-24 | Stop reason: SDUPTHER

## 2019-04-03 ENCOUNTER — TELEPHONE (OUTPATIENT)
Dept: FAMILY MEDICINE CLINIC | Age: 64
End: 2019-04-03

## 2019-04-03 RX ORDER — RAMIPRIL 10 MG/1
CAPSULE ORAL
Qty: 90 CAPSULE | Refills: 3 | Status: SHIPPED | OUTPATIENT
Start: 2019-04-03 | End: 2020-06-25 | Stop reason: SDUPTHER

## 2019-04-03 RX ORDER — METOPROLOL SUCCINATE 25 MG/1
TABLET, EXTENDED RELEASE ORAL
Qty: 180 TABLET | Refills: 3 | Status: SHIPPED | OUTPATIENT
Start: 2019-04-03 | End: 2020-06-25 | Stop reason: SDUPTHER

## 2019-04-03 NOTE — TELEPHONE ENCOUNTER
Patient requesting a medication refill.   Medication metoprolol succinate (TOPROL XL) 25 MG extended release tablet , ramipril (ALTACE) 10 MG capsule   [facility] [address]  Last office visit:2/19/19  Next office visit: 5/29/2019

## 2019-04-04 ENCOUNTER — TELEPHONE (OUTPATIENT)
Dept: PAIN MANAGEMENT | Age: 64
End: 2019-04-04

## 2019-04-04 NOTE — TELEPHONE ENCOUNTER
[address]    Screening Questionnaire     Name of current [facility] PCP (per patient): Lazarus Lat  Referring diagnosis: low back    1. Name of last Pain provider: VALERIA  2. When were you last seen by this Pain provider: VALERIA  3. Last time you had MRI/XRays done for your pain: XR 2018   Report available?: Yes  4. Are you taking any opioids at this time:  Yes   Name of medication: TRAMADOL AND TORADOL  5. Are you under opioid contract with your current provider:  No   Last date medication filled: 3/27/2019  6. Reason for switch:    - Were you discharged?:  No   - Other Reason: PCP CAN NO LONGER WRITE. We need the last 3 office notes and MRI reports (within past 5 years), if any    available, before being seen    PLEASE READ FOLLOWING INFORMATION TO PATIENTS:     - We are a Comprehensive Pain Management program.   - Prior pain medications may be changed, based on our evaluation.   - We may require Behavioral Health consultation with Pain Psychologist (Dr Jonathan Castillo) at the time of initial evaluation. If done so, there may be a separate charge for the psychology services. Please allow additional 30 minutes to complete this evaluation.     - You need a CURRENT [facility] provider.    (check with the referring provider's office to confirm). IF OLD RECORDS (INCLUDING MRI REPORT) NOT RECEIVED WITHIN 2 WEEKS, WE MAY SEND REFERRAL BACK TO REFERRING PROVIDER. We ask that you bring your Photo ID, Insurance card and any co-payments that are due at the time of your services. Any unresolved questions, please refer to the Provider for approval.    Approved for Consult:   Yes

## 2019-04-10 RX ORDER — PRAVASTATIN SODIUM 20 MG
20 TABLET ORAL EVERY EVENING
Qty: 30 TABLET | Refills: 2 | Status: SHIPPED | OUTPATIENT
Start: 2019-04-10 | End: 2019-07-24 | Stop reason: SDUPTHER

## 2019-04-16 RX ORDER — BUPROPION HYDROCHLORIDE 150 MG/1
TABLET, EXTENDED RELEASE ORAL
Qty: 60 TABLET | Refills: 0 | Status: SHIPPED | OUTPATIENT
Start: 2019-04-16 | End: 2019-05-16 | Stop reason: SDUPTHER

## 2019-04-16 NOTE — PROGRESS NOTES
Medical History:   Diagnosis Date    Anxiety     Fibromyalgia     Gout     Hyperlipidemia     Hypertension     Osteoarthritis        Past Surgical History:   Procedure Laterality Date    APPENDECTOMY  02/13/2019    HYSTERECTOMY      INNER EAR SURGERY      many    KNEE ARTHROSCOPY  2009    RT       Allergies   Allergen Reactions    Darvocet [Propoxyphene N-Acetaminophen]     Lipitor [Atorvastatin]      Leg cramping       Current Outpatient Medications   Medication Sig Dispense Refill    diclofenac (VOLTAREN) 50 MG EC tablet Take 1 tablet by mouth 2 times daily With food 60 tablet 1    Baclofen 5 MG TABS Take 5 mg by mouth 2 times daily 60 tablet 1    buPROPion (ZYBAN) 150 MG extended release tablet TAKE ONE TABLET BY MOUTH TWICE DAILY 60 tablet 0    pravastatin (PRAVACHOL) 20 MG tablet Take 1 tablet by mouth every evening 30 tablet 2    metoprolol succinate (TOPROL XL) 25 MG extended release tablet TAKE ONE TABLET BY MOUTH 2 TIMES A  tablet 3    ramipril (ALTACE) 10 MG capsule TAKE ONE CAPSULE BY MOUTH EVERY DAY 90 capsule 3    traMADol (ULTRAM) 50 MG tablet Take 2 tablets by mouth every 8 hours as needed for Pain for up to 30 days.  180 tablet 0    ALPRAZolam (XANAX) 0.5 MG tablet TAKE 1/2 TABLET BY MOUTH EVERY MORNING AND 1 EVERY EVENING 45 tablet 2    furosemide (LASIX) 80 MG tablet TAKE ONE TABLET BY MOUTH EVERY DAY 90 tablet 1    cyclobenzaprine (FLEXERIL) 5 MG tablet Take 2.5 mg by mouth 3 times daily as needed for Muscle spasms      acetaminophen (TYLENOL) 500 MG tablet Take 1,000 mg by mouth 3 times daily      buPROPion (WELLBUTRIN SR) 150 MG extended release tablet Take 1 tablet by mouth 2 times daily 60 tablet 2    Multiple Vitamins-Minerals (THERAPEUTIC MULTIVITAMIN-MINERALS) tablet Take 1 tablet by mouth daily Centrum      loratadine (CLARITIN) 10 MG tablet Take 10 mg by mouth daily      gabapentin (NEURONTIN) 300 MG capsule Take 1 capsule by mouth 3 times daily for 90 days. . 270 capsule 3     No current facility-administered medications for this visit. Family History   Problem Relation Age of Onset    Diabetes Mother        Social History     Socioeconomic History    Marital status:      Spouse name: Not on file    Number of children: Not on file    Years of education: Not on file    Highest education level: Not on file   Occupational History    Occupation: LPN     Comment: Full Time   Social Needs    Financial resource strain: Not on file    Food insecurity:     Worry: Not on file     Inability: Not on file    Transportation needs:     Medical: Not on file     Non-medical: Not on file   Tobacco Use    Smoking status: Current Every Day Smoker     Packs/day: 1.00     Types: Cigarettes    Smokeless tobacco: Never Used   Substance and Sexual Activity    Alcohol use: No     Frequency: Never    Drug use: No    Sexual activity: Not Currently   Lifestyle    Physical activity:     Days per week: Not on file     Minutes per session: Not on file    Stress: Not on file   Relationships    Social connections:     Talks on phone: Not on file     Gets together: Not on file     Attends Hoahaoism service: Not on file     Active member of club or organization: Not on file     Attends meetings of clubs or organizations: Not on file     Relationship status: Not on file    Intimate partner violence:     Fear of current or ex partner: Not on file     Emotionally abused: Not on file     Physically abused: Not on file     Forced sexual activity: Not on file   Other Topics Concern    Not on file   Social History Narrative    Not on file       Occupation: Rehab RN  Currently Employed: Yes  Any pendinglawsuits for pain: No  Disability: No    Substance Use History:  History of Substance Abuse: No   Ongoing: No    5. Imaging Studies:   XR (09/26/2018)  CS -  \"Impression   Degenerative disc disease. \"   TS -   \"Impression   Scoliosis with mid and lower thoracic degenerative disc disease.  No evidence   of fracture. \"   LS -  \"Impression   Scoliosis with degenerative disease.  No evidence of fracture. \"       Results of the above studies were personally reviewed by me with the patient in detail along with the images, if available. [facility] other unrelated findings not addressed during today's visit. PHYSICALEXAMINATION    1. Review of Systems:   Review of Systems   Constitutional: Negative for chills and fever. HENT: Negative for hearing loss and tinnitus. Eyes: Negative for pain, discharge and redness. Respiratory: Negative for cough, shortness of breath and wheezing. Cardiovascular: Negative for chest pain and palpitations. Gastrointestinal: Negative for abdominal pain, constipation, nausea and vomiting. Genitourinary: Negative for frequency, hematuria and urgency. Musculoskeletal: Positive for arthralgias and back pain. Negative for myalgias and neck pain. Skin: Negative for rash. Neurological: Negative for dizziness, seizures, weakness and headaches. Hematological: Does not bruise/bleed easily. Psychiatric/Behavioral: Negative for suicidal ideas. The patient is not nervous/anxious. The patient was instructed to contact primary care physician regarding ROS positives not being addressed during today's visit. 2. Physical Exam:   Physical Exam   Constitutional: She is oriented to person, place, and time. No distress. HENT:   Head: Normocephalic. Eyes: Pupils are equal, round, and reactive to light. EOM are normal.   Neck: Normal range of motion. Neck supple. No thyromegaly present. Cardiovascular: Normal rate, regular rhythm, normal heart sounds and intact distal pulses. Pulmonary/Chest: Effort normal and breath sounds normal. No respiratory distress. She exhibits no tenderness. Abdominal: Soft. Bowel sounds are normal. She exhibits no mass. There is no tenderness.  There is no guarding. Musculoskeletal: Normal range of motion. She exhibits no tenderness or deformity. Lymphadenopathy:     She has no cervical adenopathy. Neurological: She is alert and oriented to person, place, and time. She has normal strength and normal reflexes. She displays normal reflexes. No cranial nerve deficit or sensory deficit. She exhibits normal muscle tone. Coordination and gait normal. She displays no Babinski's sign on the right side. She displays no Babinski's sign on the left side. Reflex Scores:       Tricep reflexes are 2+ on the right side and 2+ on the left side. Bicep reflexes are 2+ on the right side and 2+ on the left side. Brachioradialis reflexes are 2+ on the right side and 2+ on the left side. Patellar reflexes are 2+ on the right side and 2+ on the left side. Achilles reflexes are 2+ on the right side and 2+ on the left side. Skin: Skin is warm. No rash noted. She is not diaphoretic. Psychiatric: She has a normal mood and affect. Her behavior is normal. Thought content normal.       Vitals:    04/17/19 1103 04/17/19 1106   BP: (!) 144/81 (!) 140/79   Site: Right Upper Arm    Position: Sitting    Cuff Size: Large Adult    Pulse: 64    Weight: 185 lb (83.9 kg)    Height: 5' 10\" (1.778 m)        Body mass index is 26.54 kg/m². Pain Score:   2 /10    3. Functional Assessment:     Brief Pain Inventory (BPI)   Pain Score = 6/10   Interference Score = 3 /10    Pain, Enjoyment, General Activity (PEG-3)    Score = 5 /10    Pain Disability Index (PDI)    Score = 28 /70      Patient Health Questionnaire-9 (PHQ-9)    Score = 4 /27      General Anxiety Disorder-7 (JACQUES-7)    Score = 6 /21     Pain Catastrophizing Scale (PCS)    Score = 8 /52    ASSESSMENT    1. Other spondylosis, lumbar region  Chronic  - diclofenac (VOLTAREN) 50 MG EC tablet; Take 1 tablet by mouth 2 times daily With food  Dispense: 60 tablet;  Refill: 1  - External Referral To Physical Therapy  - Baclofen 5 MG TABS; Take 5 mg by mouth 2 times daily  Dispense: 60 tablet; Refill: 1    2. Other spondylosis, cervical region  Chronic  - diclofenac (VOLTAREN) 50 MG EC tablet; Take 1 tablet by mouth 2 times daily With food  Dispense: 60 tablet; Refill: 1  - External Referral To Physical Therapy  - Baclofen 5 MG TABS; Take 5 mg by mouth 2 times daily  Dispense: 60 tablet; Refill: 1    3. DDD (degenerative disc disease), lumbar  Chronic  - diclofenac (VOLTAREN) 50 MG EC tablet; Take 1 tablet by mouth 2 times daily With food  Dispense: 60 tablet; Refill: 1  - External Referral To Physical Therapy  - Baclofen 5 MG TABS; Take 5 mg by mouth 2 times daily  Dispense: 60 tablet; Refill: 1    4. Thoracogenic scoliosis of thoracic region  Chronic  - diclofenac (VOLTAREN) 50 MG EC tablet; Take 1 tablet by mouth 2 times daily With food  Dispense: 60 tablet; Refill: 1  - External Referral To Physical Therapy  - Baclofen 5 MG TABS; Take 5 mg by mouth 2 times daily  Dispense: 60 tablet; Refill: 1    5. Polyarthropathy, multiple sites  Chronic  - diclofenac (VOLTAREN) 50 MG EC tablet; Take 1 tablet by mouth 2 times daily With food  Dispense: 60 tablet; Refill: 1  - External Referral To Physical Therapy  - Baclofen 5 MG TABS; Take 5 mg by mouth 2 times daily  Dispense: 60 tablet; Refill: 1    6. Chronic pain syndrome      7. Encounter for monitoring opioid maintenance therapy    - Drug Panel-PM-HI Res-UR Interp-A      TREATMENT PLAN    1. Goals:     Multidisciplinary comprehensive pain management with functional improvement and reduced opioid use. 2. Plan of Care Recommendations:     Chronic back pain with tingling/numbness in left arm/leg for >15 years; no acute changes. Physical exam showed moderate thoracolumbar kyphoscoliosis with facet tenderness and mild numbness in left arm/leg. Reviewed findings with patient and her daughter - discussed management options.   Recommend trial PT, aquatic therapy and HEP - encouraged regular home exercises. I will provide diclofenac and baclofen; continue gabapentin as before. Reports being on Ultram (6 tabs/day) for >10 years; counseled on limitation of opioid. She has enough pain medications with her - encouraged her to reduce use of Ultram to 1 pill/day/week. She was very resistant to try PT, medications or consider changes in opioid use. 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 After much discussion, explanation and counseling, she agrees to try our recommended plan of care. I will get UDT and agreement today; will consider filling Ultram 50 mg q6h PRN when due for refill. Additional imaging/referrals: NA    Physical therapy/Home exercises: PT, aquatic therapy and HEP    Psychological:  counseling for chronic pain in the future. Interventions: TBD    Adjuvant medications: diclofenac, baclofen, gabapentin. Prescription pain medications: Limit use of Ultram to 50 mg q6h PRN      Current MEDD = 30    ORT (Opioid Risk Tool) Score = 2  LOW    Other Risk factors - Anxiety, concurrent benzo. UDT (Urine Drug Test) - today    Pain Medication Agreement - today      Controlled Substances Monitoring:   Attestation: The Prescription Monitoring Report for this patient was reviewed today. Dea Guerra MD)  Chronic Pain Routine Monitoring: Random urine drug screen sent today. , Possible medication side effects, risk of tolerance/dependence & alternative treatments discussed. Dea Guerra MD)    - Reviewed OARRS report in detail, including Narx Scores and OverdoseRisk Score. - Counseled on dual effects, limitations, side effects and long-term complications of opioids including but not limited to opioid induced constipation, depression of breathing, sleep disorders, hormonalsuppression, altered immune system, elevated cardiac risk, worsening pain or hyperalgesia, dependence and addiction.  Discussed proper use and potential life threatening side effects of inappropriate use. - Discussedsafe use of prescription pain medications, including not to take more than prescribed, not to combine with alcohol or other substances, not to drive while on pain medications, store medications safely, and measures for prevention of overdose. 3. Education:     - Educational material provided on multidisciplinary chronic pain management. - Encouraged regular home exercises, weight loss measures and strengthening as long-term goals. - Counseled on role and limitations of medications and injections.  - Educationalmaterial from [facility] on  'Non-opioid treatments for chronic pain', 'Prescription Opioids' and 'Preventing an Opioid Overdose' provided. 4. Follow-up: RTC X 6 weeks. The entire treatment plan was discussed with patient andagreed. More than 60 minutes spent on face-to-face encounter with the patient by the provider. More than 50% of the time spent on counseling/coordination of care regarding chronic pain. Thank you for the referral - please do not hesitate to contact us if you have any questions or concerns.         Lizeth Weiss MD  Board Certified in Anesthesiology and Pain Medicine

## 2019-04-17 ENCOUNTER — OFFICE VISIT (OUTPATIENT)
Dept: PAIN MANAGEMENT | Age: 64
End: 2019-04-17
Payer: COMMERCIAL

## 2019-04-17 VITALS
HEIGHT: 70 IN | SYSTOLIC BLOOD PRESSURE: 140 MMHG | HEART RATE: 64 BPM | BODY MASS INDEX: 26.48 KG/M2 | WEIGHT: 185 LBS | DIASTOLIC BLOOD PRESSURE: 79 MMHG

## 2019-04-17 DIAGNOSIS — M47.892 OTHER SPONDYLOSIS, CERVICAL REGION: ICD-10-CM

## 2019-04-17 DIAGNOSIS — M47.896 OTHER SPONDYLOSIS, LUMBAR REGION: Primary | ICD-10-CM

## 2019-04-17 DIAGNOSIS — M51.36 DDD (DEGENERATIVE DISC DISEASE), LUMBAR: ICD-10-CM

## 2019-04-17 DIAGNOSIS — M41.34 THORACOGENIC SCOLIOSIS OF THORACIC REGION: ICD-10-CM

## 2019-04-17 DIAGNOSIS — M13.0 POLYARTHROPATHY, MULTIPLE SITES: ICD-10-CM

## 2019-04-17 DIAGNOSIS — Z51.81 ENCOUNTER FOR MONITORING OPIOID MAINTENANCE THERAPY: ICD-10-CM

## 2019-04-17 DIAGNOSIS — Z79.891 ENCOUNTER FOR MONITORING OPIOID MAINTENANCE THERAPY: ICD-10-CM

## 2019-04-17 DIAGNOSIS — G89.4 CHRONIC PAIN SYNDROME: ICD-10-CM

## 2019-04-17 PROCEDURE — 99204 OFFICE O/P NEW MOD 45 MIN: CPT | Performed by: ANESTHESIOLOGY

## 2019-04-17 RX ORDER — TRAMADOL HYDROCHLORIDE 50 MG/1
50 TABLET ORAL EVERY 6 HOURS PRN
Qty: 120 TABLET | Refills: 1 | Status: CANCELLED | OUTPATIENT
Start: 2019-04-17 | End: 2019-05-17

## 2019-04-17 RX ORDER — BACLOFEN 5 MG/1
5 TABLET ORAL 2 TIMES DAILY
Qty: 60 TABLET | Refills: 1 | Status: SHIPPED | OUTPATIENT
Start: 2019-04-17 | End: 2019-05-17

## 2019-04-17 RX ORDER — BACLOFEN 10 MG/1
10 TABLET ORAL 2 TIMES DAILY
Qty: 60 TABLET | Refills: 1 | Status: CANCELLED | OUTPATIENT
Start: 2019-04-17

## 2019-04-17 SDOH — HEALTH STABILITY: MENTAL HEALTH: HOW OFTEN DO YOU HAVE A DRINK CONTAINING ALCOHOL?: NEVER

## 2019-04-17 ASSESSMENT — ENCOUNTER SYMPTOMS
EYE REDNESS: 0
WHEEZING: 0
COUGH: 0
BACK PAIN: 1
EYE PAIN: 0
SHORTNESS OF BREATH: 0
ABDOMINAL PAIN: 0
CONSTIPATION: 0
VOMITING: 0
EYE DISCHARGE: 0
NAUSEA: 0

## 2019-04-17 NOTE — LETTER
MEDICATION AGREEMENT     Randabeau Woodson Necessary  5/7/1972      For certain conditions, multiple classes of medications may be used to help better manage your symptoms, and to improve your ability to function at home, work and in social settings. However, these medications do have risks, which will be discussed with you, including addiction and dependency. The following prescribed medications need frequent monitoring and will require you to partner and assist in your healthcare. Medication  Dose, instructions and quantity as indicated on current prescription bottle Diagnosis/Reason(s) for Taking Category                 f                 Benefits and goals of Controlled Substance Medications: There are two potential goals for your treatment: (1) decreased pain and suffering (2) improved daily life functions. There are many possible treatments for your chronic condition(s), and, in addition to controlled substance medications, we will try alternatives such as physical therapy, yoga, massage, home daily exercise, meditation, relaxation techniques, injections, chiropractic manipulations, surgery, cognitive therapy, hypnosis and many medications that are not habit-forming. Use of controlled substance medications may be helpful, but they are unlikely to resolve all of your symptoms or restore all function. Risks of Controlled Substance Medications:    Opioid pain medications: These medications can lead to problems such as addiction/dependence, sedation, lightheadedness/dizziness, memory issues, falls, constipation, nausea, or vomiting. They may also impair the ability to drive or operate machinery. Additionally, these medications may lower testosterone levels, leading to loss of bone strength, stamina and sex drive. They may cause problems with breathing, sleep apnea and reduced coughing, which are especially dangerous for patients with lung disease.   Overdose or dangerous interactions with alcohol and other medications may occur, leading to death. Hyperalgesia may develop, in which patients receiving opioids for the treatment of pain may actually become more sensitive to certain painful stimuli, and in some cases, experience pain from ordinarily non-painful stimuli. Women between the ages of 14-53 who could become pregnant should carefully weigh the risks and benefits of opioids with their physicians, as these medications increase the risk of pregnancy complications, including miscarriage,  delivery and stillbirth. It is also possible for babies to be born addicted to opioids. Opioid dependence withdrawal symptoms may include; feelings of uneasiness, increased pain, irritability, belly pain, diarrhea, sweats and goose-flesh. Benzodiazepines and non-benzodiazepine sleep medications: These medications can lead to problems such as addiction/dependence, sedation, fatigue, lightheadedness, dizziness, incoordination, falls, depression, hallucinations, and impaired judgment, memory and concentration. The ability to drive and operate machinery may also be affected. Abnormal sleep-related behaviors have been reported, including sleep walking, driving, making telephone calls, eating, or having sex while not fully awake. These medications can suppress breathing and worsen sleep apnea, particularly when combined with alcohol or other sedating medications, potentially leading to death. Dependence withdrawal symptoms may include tremors, anxiety, hallucinations and seizures. Stimulants:  Common adverse effects include addiction/dependence, increased blood pressure and heart rate, decreased appetite, nausea, involuntary weight loss, insomnia, irritability, and headaches. These risks may increase when these medications are combined with other stimulants, such as caffeine pills or energy drinks, certain weight loss supplements and oral decongestants.   Dependence withdrawal symptoms may including social, physical, psychological and daily or work activities. 2. I will not use illegal or street drugs or another person's prescription. If I have an addiction problem with drugs or alcohol and my provider asks me to enter a program to address this issue, I agree to follow through. Such programs may include:  · 12-Step program and securing a sponsor  · Individual counseling   · Inpatient or outpatient treatment  · Other:_____________________________________________________________________________________________________________________________________________    If in treatment, I will request that a copy of the programs initial evaluation and treatment recommendations be sent to this provider and will not expect refills until that is received. I will also request written monthly updates be sent to this provider to verify my continuing treatment. 3. I will consent to drug screening upon my providers request to assure I am only taking the prescribed drugs, described in this MEDICATION AGREEMENT. I understand that a drug screen is a laboratory test in which a sample of my urine, blood or saliva is checked to see what drugs I have been taking. 4. I agree that I will treat the providers and staff at this office with respect at all times. I will keep all of my scheduled appointments, but if I need to cancel my appointment, I will do so a minimum of 24 hours before it is scheduled. 5. I understand that this provider may stop prescribing the medications listed if:  · I do not show any improvement in pain, or my activity has not improved. · I develop rapid tolerance or loss of improvement, as described in my treatment plan. · I develop significant side effects from the medication. · My behavior is inconsistent with the responsibilities outlined above, which may also result in my being prevented from receiving further care from this office.   ·

## 2019-04-17 NOTE — PATIENT INSTRUCTIONS
Patient Education     I have ordered PT/water therapy for the back   I have provided dicofenac, baclofen (Stop Flexeril) and continue gabapentin. Limit use of Ultram for severe pain as discussed      Learning About Managing Chronic Pain  What is a plan for pain management? A pain management plan helps you find ways to control pain with side effects you can live with. Some diseases and injuries can cause pain that lasts a long time. Constant pain can make you depressed. It can cause stress and make it hard for you to eat and sleep. But you don't need to live with uncontrolled pain. How can you plan for managing your pain? You and your doctor will work to make your plan. Your plan can include more than one type of pain control. You may take prescription or over-the-counter drugs. You can also try physical treatments, like massage and acupuncture. Other things can help too, such as meditation or a type of therapy to change how you think about your pain. It's important to let your doctor know how you prefer to control your pain. Sometimes the goal of a pain management plan isn't to totally get rid of pain. Instead, it might be to reduce the pain enough that daily activities are easier. If your pain isn't controlled well enough, talk with your doctor. You may need to make a new plan. Or your doctor may refer you to a specialist.  What medicines are used? Your doctor may prescribe medicine to help with your pain. If you aren't taking a prescription medicine, you may be able to take an over-the-counter one. Here are the main types of medicine for chronic pain. · Non-opioids. These are things like acetaminophen, such as Tylenol, and non-steroidal anti-inflammatory drugs (NSAIDs), such as Advil. · Opioids. Morphine, codeine, and oxycodone are some examples. · Other medicines. Antidepressants and anti-seizure medicines may be used. These medicines seem to change the way your brain senses pain.  Another option may be a nerve block injection. Medicines are the most common treatment for pain. But to feel better, you'll need to do more than take medicine. You can also do things like reducing your stress level and changing how you think. How can you take medicine safely? Medicines can help you get better. But they can also be dangerous, especially if you don't take them the right way. Be safe with medicines. Read and follow all instructions on the label. If the medicine you take causes side effects such as constipation or nausea, you may need to take other medicines for those problems. Talk to your doctor about any side effects you have. If you were prescribed an opioid pain reliever, your care team will give you information on how to use it safely. You will also get directions for how to safely store the medicine and how to get rid of any that's left over. Follow these instructions carefully. What physical treatments can help? Physical treatments can be an important part of managing chronic pain. You may find that combining more than one treatment helps the most.  These treatments can include:  · Heat or cold. This can help arthritis, sore muscles, and other aches. · Hydrotherapy. It uses flowing water to relax muscles. · Massage. Massage involves rubbing the soft tissues of the body. It eases tension and pain. · Transcutaneous electrical nerve stimulation (TENS). This treatment uses a gentle electric current applied to the skin for pain relief. · Acupuncture. This is a form of traditional [facility] medicine. It uses very thin needles inserted into certain points of the body. · Physical therapy. This treatment uses stretches and exercises to reduce pain and help you move better. If you get physical therapy, make sure to do any home exercises or stretching your therapist has prescribed. Stay as active as you can. Try to get some physical activity every day. What other things can help?   You can manage chronic pain by using things other than medicines or physical treatments. For example, you can keep track of your pain in a pain diary. It can help you understand how the things you do affect your pain. Reducing stress and tension can reduce pain. And being more aware of your thought patterns can be helpful. In some cases, shifting how you think about pain can affect how you feel. Here are some options to think about:  · Breathing exercises and meditation. These techniques can help you focus your attention, relax, and get rid of tension. · Guided imagery. This is a series of thoughts and images that can focus your attention away from your pain. · Hypnosis. It's a state of focused concentration that makes you less aware of your surroundings. · Cognitive behavioral therapy. This type of counseling helps you change your thought patterns. · Yoga. Stretching and exercises can reduce stress and improve flexibility. If what you're doing to control your pain isn't working, or if you're feeling depressed, talk to your doctor. He or she can help you change your pain management plan and find resources for emotional support. Where can you learn more? Go to https://[employer]. org and sign in to your [employer] account. Enter P119 in the [employer]Star box to learn more about \"Learning About Managing Chronic Pain. \"     If you do not have an account, please click on the \"Sign Up Now\" link. Current as of: Carli 3, 2018  Content Version: 11.9  © 1386-4647 [employer]. Care instructions adapted under license by [facility] ([facility]). If you have questions about a medical condition or this instruction, always ask your healthcare professional. [address] any warranty or liability for your use of this information.

## 2019-04-18 DIAGNOSIS — M47.892 OTHER SPONDYLOSIS, CERVICAL REGION: Primary | ICD-10-CM

## 2019-04-18 DIAGNOSIS — M47.896 OTHER SPONDYLOSIS, LUMBAR REGION: ICD-10-CM

## 2019-04-18 RX ORDER — TIZANIDINE 2 MG/1
2 TABLET ORAL 2 TIMES DAILY PRN
Qty: 15 TABLET | Refills: 0 | Status: SHIPPED | OUTPATIENT
Start: 2019-04-18 | End: 2019-05-21

## 2019-04-20 LAB
6-ACETYLMORPHINE: NOT DETECTED
7-AMINOCLONAZEPAM: NOT DETECTED
ALPHA-OH-ALPRAZOLAM: PRESENT
ALPRAZOLAM: NOT DETECTED
AMPHETAMINE: NOT DETECTED
BARBITURATES: NOT DETECTED
BENZOYLECGONINE: NOT DETECTED
BUPRENORPHINE: NOT DETECTED
CARISOPRODOL: NOT DETECTED
CLONAZEPAM: NOT DETECTED
CODEINE: NOT DETECTED
CREATININE URINE: 51.8 MG/DL (ref 20–400)
DIAZEPAM: NOT DETECTED
DRUGS EXPECTED: NORMAL
EER PAIN MGT DRUG PANEL, HIGH RES/EMIT U: NORMAL
ETHYL GLUCURONIDE: NOT DETECTED
FENTANYL: NOT DETECTED
HYDROCODONE: NOT DETECTED
HYDROMORPHONE: NOT DETECTED
LORAZEPAM: NOT DETECTED
MARIJUANA METABOLITE: NOT DETECTED
MDA: NOT DETECTED
MDEA: NOT DETECTED
MDMA URINE: NOT DETECTED
MEPERIDINE: NOT DETECTED
METHADONE: NOT DETECTED
METHAMPHETAMINE: NOT DETECTED
METHYLPHENIDATE: NOT DETECTED
MIDAZOLAM: NOT DETECTED
MORPHINE: NOT DETECTED
NORBUPRENORPHINE, FREE: NOT DETECTED
NORDIAZEPAM: NOT DETECTED
NORFENTANYL: NOT DETECTED
NORHYDROCODONE, URINE: NOT DETECTED
NOROXYCODONE: NOT DETECTED
NOROXYMORPHONE, URINE: NOT DETECTED
OXAZEPAM: NOT DETECTED
OXYCODONE: NOT DETECTED
OXYMORPHONE: NOT DETECTED
PAIN MANAGEMENT DRUG PANEL: NORMAL
PAIN MANAGEMENT DRUG PANEL: NORMAL
PCP: NOT DETECTED
PHENTERMINE: NOT DETECTED
PROPOXYPHENE: NOT DETECTED
TAPENTADOL, URINE: NOT DETECTED
TAPENTADOL-O-SULFATE, URINE: NOT DETECTED
TEMAZEPAM: NOT DETECTED
TRAMADOL: PRESENT
ZOLPIDEM: NOT DETECTED

## 2019-04-24 DIAGNOSIS — Z02.89 PAIN MEDICATION AGREEMENT: ICD-10-CM

## 2019-04-24 DIAGNOSIS — M47.892 OTHER SPONDYLOSIS, CERVICAL REGION: Primary | ICD-10-CM

## 2019-04-24 DIAGNOSIS — M47.896 OTHER SPONDYLOSIS, LUMBAR REGION: ICD-10-CM

## 2019-04-24 DIAGNOSIS — F11.90 CHRONIC, CONTINUOUS USE OF OPIOIDS: ICD-10-CM

## 2019-04-24 DIAGNOSIS — M13.0 POLYARTHROPATHY, MULTIPLE SITES: ICD-10-CM

## 2019-04-24 RX ORDER — TRAMADOL HYDROCHLORIDE 50 MG/1
50 TABLET ORAL EVERY 6 HOURS PRN
Qty: 120 TABLET | Refills: 0 | Status: SHIPPED | OUTPATIENT
Start: 2019-04-29 | End: 2019-05-29

## 2019-04-24 NOTE — TELEPHONE ENCOUNTER
MEDICATION ISSUES     Reported Issue:  Medication Refill    Medication Information     - Refill medication requested: Tramadol      - Medication dosage and quantity: 50 mg [id number]      - Reporting side effects, if any: No     - Other issues, if any: None      Controlled Substances     - OARRS done: Yes     - Last refill date (per OARRS): 3/28/19       - Follow-up Appointment date: Yes    Pharmacy Information      - PHARMACY updated with patient: Yes     - PHARMACY updated in Chart: Yes      NOTE for Controlled Substances     PLEASE READ TO PATIENTS:    1. For routine refills: ALL MEDICATION REFILLS NEED 2 WORKING DAYS (48-HOUR) ADVANCED NOTICE - Not filled on weekends or holidays.     - Patient informed about the above policy:  Yes    2. If switching or changing opioid pain medications -    PATIENTS NEED TO BRING OLD MEDICATION FOR PILL COUNT AND POSSIBLE DESTRUCTION - before new medication could be filled.      - Patient informed about the above policy:  Yes

## 2019-04-26 ENCOUNTER — TELEPHONE (OUTPATIENT)
Dept: PAIN MANAGEMENT | Age: 64
End: 2019-04-26

## 2019-04-26 ENCOUNTER — TELEPHONE (OUTPATIENT)
Dept: FAMILY MEDICINE CLINIC | Age: 64
End: 2019-04-26

## 2019-04-26 RX ORDER — PREDNISONE 20 MG/1
TABLET ORAL
Qty: 15 TABLET | Refills: 0 | Status: SHIPPED | OUTPATIENT
Start: 2019-04-26 | End: 2019-05-21 | Stop reason: ALTCHOICE

## 2019-04-26 NOTE — TELEPHONE ENCOUNTER
Patient called stating she's having a reaction to the Diclofenac. She broke out in a rash on her arms, back of her neck & Buttocks. This is the only thing that could've caused that reaction. She said she's not taking it anymore. Patient works 3rd shift, it's ok to leave .

## 2019-04-26 NOTE — TELEPHONE ENCOUNTER
Patient is calling to inform that she was prescribed diclofenac sodium   by pain management doctor Dr Arturo Urrutia   And she  has broken out in hives on arms ,  back of neck and  upper legs it is  very  Itchy  And painful  . Has not been able to get in touch with that doctor . Please advise . Pharmacy:  [facility] [address] [place] [address] [phone] - U [phone]    Provider out of office .

## 2019-04-26 NOTE — TELEPHONE ENCOUNTER
[facility] [address]    Phone:  [phone]       Thank You for choosing us for your health care visit. We are glad to serve you and happy to provide you with this summary of your visit. Please help us to ensure we have accurate records. If you find anything that needs to be changed, please let our staff know as soon as possible.          Your Demographic Information     Patient Name Sex Alexandra Almanzar Female 1985       Ethnic Group Patient Race    Not of  or  Origin White      Your Visit Details     Date & Time Provider Department    3/7/2017 10:20 AM [facility]      Your Upcoming Appointment*(Max 10)     2017 11:30 AM CST   Follow-up Visit with MD Jacqueline Bennett [facility])    [address] [place] [id number]   [phone]              Your To Do List     Follow-Up    Return if symptoms worsen or fail to improve.      Conditions Discussed Today or Order-Related Diagnoses        Comments    Abscess of finger of right hand    -  Primary       Your Vitals Were     Smoking Status                   Current Some Day Smoker           Medications Prescribed or Re-Ordered Today     None      Your Current Medications Are        Disp Refills Start End    rifAMPIN (RIFADIN) 300 MG capsule 60 capsule 0 2017     Sig - Route: Take 2 capsules by mouth daily. - Oral    Class: Eprescribe    Cosign for Ordering: Accepted by Nely Rai MD on 2017  4:35 PM    ethambutol (MYAMBUTOL) 400 MG tablet        Sig - Route: Take 1,600 mg by mouth daily. 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 - Oral    Class: Eprescribe    traMADOL (ULTRAM) 50 MG  Obviously she should stop the diclofenac medication. I sent a prescription for prednisone to the pharmacy to counteract the reaction. tablet        Sig - Route: Take 50 mg by mouth every 6 hours as needed for Pain. - Oral    Class: Historical Med    fluticasone (FLONASE) 50 MCG/ACT nasal spray        Sig - Route: Spray in each nostril daily. - Nasal    Class: Historical Med    vitamin - therapeutic multivitamins w/minerals (CENTRUM SILVER,THERA-M) Tab        Sig - Route: Take 1 tablet by mouth daily. - Oral    Class: Historical Med      Allergies     Levothyroxine CARDIAC DISTURBANCES    Severe Heart palpitations    Opioid Analgesics SHORTNESS OF BREATH    Difficulty breathing    Azithromycin Other (See Comments)    Ear ringing    Bactrim [Sulfamethoxazole W/trimethoprim] HIVES    Sulfamethoxazole-trimethoprim [Bactrim Ds] HIVES      Problem List as of 3/7/2017     Palpitations    PVC (premature ventricular contraction)    Paroxysmal atrial tachycardia    Unspecified hypothyroidism    Tobacco abuse    Low HDL (under 40)    Obesity (BMI 30.0-34.9)    Eczema of external ear    Abscess of finger of right hand            Patient Instructions     None

## 2019-05-02 ENCOUNTER — TELEPHONE (OUTPATIENT)
Dept: FAMILY MEDICINE CLINIC | Age: 64
End: 2019-05-02

## 2019-05-02 DIAGNOSIS — M79.7 FIBROMYALGIA: Primary | ICD-10-CM

## 2019-05-02 NOTE — TELEPHONE ENCOUNTER
Patient was referred to pain management Dr Krysta Waters . She had an allergic reaction to the medication he prescribed for her and just was really unhappy with her visit . she is wondering if you would be willing to write a referral for her to see a physician within [facility] and rheumatology . She has spoken with them  and they are willing to take her on  as a patient .      Ph : [phone]

## 2019-05-02 NOTE — TELEPHONE ENCOUNTER
Left detailed message advising patient that referral she has requested has been placed and to call us with any questions or concerns.

## 2019-05-16 RX ORDER — BUPROPION HYDROCHLORIDE 150 MG/1
TABLET, EXTENDED RELEASE ORAL
Qty: 60 TABLET | Refills: 0 | Status: SHIPPED | OUTPATIENT
Start: 2019-05-16 | End: 2019-05-29

## 2019-05-21 ENCOUNTER — OFFICE VISIT (OUTPATIENT)
Dept: RHEUMATOLOGY | Age: 64
End: 2019-05-21
Payer: COMMERCIAL

## 2019-05-21 VITALS
HEIGHT: 67 IN | HEART RATE: 76 BPM | BODY MASS INDEX: 29.69 KG/M2 | DIASTOLIC BLOOD PRESSURE: 84 MMHG | SYSTOLIC BLOOD PRESSURE: 154 MMHG | WEIGHT: 189.13 LBS

## 2019-05-21 DIAGNOSIS — M79.10 MYALGIA: Primary | ICD-10-CM

## 2019-05-21 DIAGNOSIS — M54.50 CHRONIC MIDLINE LOW BACK PAIN WITHOUT SCIATICA: ICD-10-CM

## 2019-05-21 DIAGNOSIS — G89.29 CHRONIC MIDLINE LOW BACK PAIN WITHOUT SCIATICA: ICD-10-CM

## 2019-05-21 DIAGNOSIS — G47.9 DISORDERED SLEEP: ICD-10-CM

## 2019-05-21 LAB
HEPATITIS C ANTIBODY INTERPRETATION: NORMAL
TOTAL CK: 71 U/L (ref 26–192)
TSH SERPL DL<=0.05 MIU/L-ACNC: 1.19 UIU/ML (ref 0.27–4.2)
VITAMIN D 25-HYDROXY: 29.8 NG/ML

## 2019-05-21 PROCEDURE — 99215 OFFICE O/P EST HI 40 MIN: CPT | Performed by: INTERNAL MEDICINE

## 2019-05-21 NOTE — PROGRESS NOTES
Subjective:      Patient ID: Marlo Ovalle is a 58 y.o. female. HPI  This 50-year-old woman is sent by Dr. Petra Wheeler for treatment of fibromyalgia. She was previously seen by Dr. Guevara Most about 2-1/2 years ago. She had an allergic reaction to diclofenac recently in his medication was stopped. She's on Neurontin 300 mg 3 times daily as well as well. In the past she was on Cymbalta and stopped the medication for unclear reasons. Review of Systems awakens 3-4 times nightly denies snoring she denies waking up fatigued. Denies peptic ulcer disease denies radicular symptoms denies unexplained weight loss fever night sweats denies joint swelling except for the ankle which resolves with recumbency    Objective:   Physical Exam  BP (!) 154/84   Pulse 76   Ht 5' 7.11\" (1.705 m)   Wt 189 lb 2 oz (85.8 kg)   BMI 29.52 kg/m²   Alert female in no acute distress lungs clear to auscultation    cardiovascular exam normal sinus rhythm. Musculoskeletal exam reveals multiple tender points consistent with fibromyalgia muscle strength 5 /5 upper and lower extremities reflexes  equal bilaterally  Assessment:      Myalgia. Disordered sleep      Plan:      Blood work will be obtained to look for secondary causes that could mimic fibromyalgia. She will taper Wellbutrin  over the next 9 weeks. I'll see her back in 9 weeks time.   She will check with her  regarding snoring and if prevalent sleep study will be ordered        Donato Ernst MD

## 2019-05-29 ENCOUNTER — OFFICE VISIT (OUTPATIENT)
Dept: FAMILY MEDICINE CLINIC | Age: 64
End: 2019-05-29
Payer: COMMERCIAL

## 2019-05-29 VITALS
DIASTOLIC BLOOD PRESSURE: 70 MMHG | SYSTOLIC BLOOD PRESSURE: 102 MMHG | BODY MASS INDEX: 29.16 KG/M2 | HEART RATE: 71 BPM | OXYGEN SATURATION: 92 % | WEIGHT: 186.8 LBS

## 2019-05-29 DIAGNOSIS — M51.36 L4-L5 DISC BULGE: Primary | ICD-10-CM

## 2019-05-29 DIAGNOSIS — I10 HYPERTENSION, UNSPECIFIED TYPE: ICD-10-CM

## 2019-05-29 PROCEDURE — 99213 OFFICE O/P EST LOW 20 MIN: CPT | Performed by: INTERNAL MEDICINE

## 2019-05-29 RX ORDER — HYDROCODONE BITARTRATE AND ACETAMINOPHEN 5; 325 MG/1; MG/1
1 TABLET ORAL EVERY 6 HOURS PRN
COMMUNITY
End: 2019-07-03

## 2019-05-29 RX ORDER — ALPRAZOLAM 0.5 MG/1
TABLET ORAL
Refills: 2 | COMMUNITY
Start: 2019-05-22 | End: 2019-07-03

## 2019-05-29 RX ORDER — CYCLOBENZAPRINE HCL 10 MG
10 TABLET ORAL 3 TIMES DAILY PRN
COMMUNITY
End: 2019-06-05 | Stop reason: SDUPTHER

## 2019-05-29 ASSESSMENT — ENCOUNTER SYMPTOMS
BACK PAIN: 1
COUGH: 0
SHORTNESS OF BREATH: 0

## 2019-05-29 NOTE — PROGRESS NOTES
2019    Mani Styles (:  1955) is a 58 y.o. female, here for evaluation of the following medical concerns:    HPI Patient presents today for transfer of care, former Dr. Ladora Rubinstein patient. Patient reports she is getting by. She reports 3 days ago had sudden onset sharp right-sided back pain, radiating around into her groin and down her proximal thigh. She is concerned she dislocated her hip so she went to the emergency room, where she was diagnosed with an L4 disc herniation. She was referred to spinal surgery and has an appointment with him next week. She reports she has been managing the pain combination of Norco, tramadol, ibuprofen, heat, ice. She reports it is uncomfortable to walk, but the pain is worse when she is lying down or sitting for prolonged periods of time. She denies numbness or tingling into her foot or lower leg. She denies weakness in the leg. She denies incontinence. Review of Systems   Constitutional: Negative for activity change, appetite change, chills, fatigue and fever. Respiratory: Negative for cough and shortness of breath. Cardiovascular: Negative for chest pain and palpitations. Musculoskeletal: Positive for arthralgias, back pain and gait problem. Skin: Negative for rash. Neurological: Negative for headaches. Current Outpatient Medications on File Prior to Visit   Medication Sig Dispense Refill    ALPRAZolam (XANAX) 0.5 MG tablet TAKE ONE HALF TABLET PO QAM AND 1 T PO EVERY EVENING  2    cyclobenzaprine (FLEXERIL) 10 MG tablet Take 10 mg by mouth 3 times daily as needed for Muscle spasms      HYDROcodone-acetaminophen (NORCO) 5-325 MG per tablet Take 1 tablet by mouth every 6 hours as needed for Pain.  traMADol (ULTRAM) 50 MG tablet Take 1 tablet by mouth every 6 hours as needed for Pain for up to 30 days.  120 tablet 0    pravastatin (PRAVACHOL) 20 MG tablet Take 1 tablet by mouth every evening 30 tablet 2    metoprolol succinate (TOPROL XL) 25 MG extended release tablet TAKE ONE TABLET BY MOUTH 2 TIMES A  tablet 3    ramipril (ALTACE) 10 MG capsule TAKE ONE CAPSULE BY MOUTH EVERY DAY 90 capsule 3    furosemide (LASIX) 80 MG tablet TAKE ONE TABLET BY MOUTH EVERY DAY 90 tablet 1    acetaminophen (TYLENOL) 500 MG tablet Take 1,000 mg by mouth 3 times daily      gabapentin (NEURONTIN) 300 MG capsule Take 1 capsule by mouth 3 times daily for 90 days. . 270 capsule 3    buPROPion (WELLBUTRIN SR) 150 MG extended release tablet Take 1 tablet by mouth 2 times daily 60 tablet 2    Multiple Vitamins-Minerals (THERAPEUTIC MULTIVITAMIN-MINERALS) tablet Take 1 tablet by mouth daily Centrum       No current facility-administered medications on file prior to visit.          Allergies   Allergen Reactions    Darvocet [Propoxyphene N-Acetaminophen]     Lipitor [Atorvastatin]      Leg cramping    Diclofenac Rash       Past Medical History:   Diagnosis Date    Anxiety     Fibromyalgia     Gout     Hyperlipidemia     Hypertension     Osteoarthritis        Past Surgical History:   Procedure Laterality Date    APPENDECTOMY  02/13/2019    HYSTERECTOMY      INNER EAR SURGERY      many    KNEE ARTHROSCOPY  2009    RT       Social History     Socioeconomic History    Marital status:      Spouse name: Not on file    Number of children: Not on file    Years of education: Not on file    Highest education level: Not on file   Occupational History    Occupation: LPN     Comment: Full Time   Social Needs    Financial resource strain: Not on file    Food insecurity:     Worry: Not on file     Inability: Not on file    Transportation needs:     Medical: Not on file     Non-medical: Not on file   Tobacco Use    Smoking status: Current Every Day Smoker     Packs/day: 1.00     Types: Cigarettes    Smokeless tobacco: Never Used   Substance and Sexual Activity    Alcohol use: No     Frequency: Never    Drug use: No    Sexual activity: Not Currently   Lifestyle    Physical activity:     Days per week: Not on file     Minutes per session: Not on file    Stress: Not on file   Relationships    Social connections:     Talks on phone: Not on file     Gets together: Not on file     Attends Hoahaoism service: Not on file     Active member of club or organization: Not on file     Attends meetings of clubs or organizations: Not on file     Relationship status: Not on file    Intimate partner violence:     Fear of current or ex partner: Not on file     Emotionally abused: Not on file     Physically abused: Not on file     Forced sexual activity: Not on file   Other Topics Concern    Not on file   Social History Narrative    Not on file        Family History   Problem Relation Age of Onset    Diabetes Mother        /70 (Site: Left Upper Arm, Position: Sitting, Cuff Size: Large Adult)   Pulse 71   Wt 186 lb 12.8 oz (84.7 kg)   SpO2 92%   BMI 29.16 kg/m²     Estimated body mass index is 29.16 kg/m² as calculated from the following:    Height as of 5/21/19: 5' 7.11\" (1.705 m). Weight as of this encounter: 186 lb 12.8 oz (84.7 kg). Physical Exam   Constitutional: She is oriented to person, place, and time. She appears well-developed and well-nourished. She is cooperative. No distress. HENT:   Head: Normocephalic and atraumatic. Eyes: Pupils are equal, round, and reactive to light. Conjunctivae and EOM are normal.   Cardiovascular: Normal rate, regular rhythm, normal heart sounds and intact distal pulses. Exam reveals no gallop and no friction rub. No murmur heard. Pulmonary/Chest: Effort normal and breath sounds normal. No respiratory distress. She has no wheezes. She has no rales. She exhibits no tenderness. Musculoskeletal:        Lumbar back: She exhibits no swelling, no edema and no deformity. Right upper leg: She exhibits no tenderness, no swelling and no deformity.         Left upper leg: She exhibits no tenderness, no swelling and no deformity. Moderate tenderness palpation lumbar spinous processes. Moderate tenderness to palpation right paraspinous lumbar musculature. Worse pain with resisted hip flexion, resisted knee extension on right. No sensation deficits. No strength deficits. Neurological: She is alert and oriented to person, place, and time. No sensory deficit. Reflex Scores:       Patellar reflexes are 2+ on the right side and 2+ on the left side. Achilles reflexes are 2+ on the right side and 2+ on the left side. Skin: Skin is warm and dry. No rash noted. She is not diaphoretic. No erythema. Psychiatric: She has a normal mood and affect. Her behavior is normal. Judgment and thought content normal.   Vitals reviewed. ASSESSMENT/PLAN:  Jasiel Umana was seen today for new patient. Diagnoses and all orders for this visit:    L4-L5 disc bulge  -     MRI Lumbar Spine WO Contrast; Future   - MRI in anticipation of spine appointment. Hypertension, unspecified type   - At goal, continue current regimen    Current Outpatient Medications   Medication Sig Dispense Refill    ALPRAZolam (XANAX) 0.5 MG tablet TAKE ONE HALF TABLET PO QAM AND 1 T PO EVERY EVENING  2    cyclobenzaprine (FLEXERIL) 10 MG tablet Take 10 mg by mouth 3 times daily as needed for Muscle spasms      HYDROcodone-acetaminophen (NORCO) 5-325 MG per tablet Take 1 tablet by mouth every 6 hours as needed for Pain.  traMADol (ULTRAM) 50 MG tablet Take 1 tablet by mouth every 6 hours as needed for Pain for up to 30 days.  120 tablet 0    pravastatin (PRAVACHOL) 20 MG tablet Take 1 tablet by mouth every evening 30 tablet 2    metoprolol succinate (TOPROL XL) 25 MG extended release tablet TAKE ONE TABLET BY MOUTH 2 TIMES A  tablet 3    ramipril (ALTACE) 10 MG capsule TAKE ONE CAPSULE BY MOUTH EVERY DAY 90 capsule 3    furosemide (LASIX) 80 MG tablet TAKE ONE TABLET BY MOUTH EVERY DAY 90 tablet 1    acetaminophen (TYLENOL) 500 MG tablet Take 1,000 mg by mouth 3 times daily      gabapentin (NEURONTIN) 300 MG capsule Take 1 capsule by mouth 3 times daily for 90 days. . 270 capsule 3    buPROPion (WELLBUTRIN SR) 150 MG extended release tablet Take 1 tablet by mouth 2 times daily 60 tablet 2    Multiple Vitamins-Minerals (THERAPEUTIC MULTIVITAMIN-MINERALS) tablet Take 1 tablet by mouth daily Centrum       No current facility-administered medications for this visit. Health Maintenance Due   Topic Date Due    HIV screen  08/05/1969    Cervical cancer screen  08/05/1975    Shingles Vaccine (2 of 3) 11/26/2015    DEXA (modify frequency per FRAX score)  02/18/2016    Breast cancer screen  12/04/2017     No follow-ups on file. An  electronic signature was used to authenticate this note.     -- Rosalinda Thakur MD on 5/29/2019 at 10:56 AM

## 2019-06-03 ENCOUNTER — TELEPHONE (OUTPATIENT)
Dept: FAMILY MEDICINE CLINIC | Age: 64
End: 2019-06-03

## 2019-06-04 ENCOUNTER — TELEPHONE (OUTPATIENT)
Dept: FAMILY MEDICINE CLINIC | Age: 64
End: 2019-06-04

## 2019-06-04 DIAGNOSIS — M51.26 HERNIATION OF RIGHT SIDE OF L4-L5 INTERVERTEBRAL DISC: Primary | ICD-10-CM

## 2019-06-04 NOTE — TELEPHONE ENCOUNTER
MRI d/w pt - please try to get her in with Dr. Rosana Feliciano at [facility] [phone] for epidural

## 2019-06-05 ENCOUNTER — TELEPHONE (OUTPATIENT)
Dept: FAMILY MEDICINE CLINIC | Age: 64
End: 2019-06-05

## 2019-06-05 RX ORDER — CYCLOBENZAPRINE HCL 10 MG
10 TABLET ORAL 3 TIMES DAILY PRN
Qty: 30 TABLET | Refills: 0 | Status: SHIPPED | OUTPATIENT
Start: 2019-06-05 | End: 2019-10-21

## 2019-06-05 NOTE — TELEPHONE ENCOUNTER
Patient is stating that she is needing something for pain for her back    Pharmacy:  [address], [place] [address] [address] [phone]

## 2019-06-05 NOTE — TELEPHONE ENCOUNTER
Advised patient that ML sent script to the pharmacy and that I have left a message with scheduling. Patient states that she has open  availability at this time.

## 2019-06-07 NOTE — TELEPHONE ENCOUNTER
Marco Antonio Harmon from Dr. Jay Cooper office called about getting patient scheduled.  Before they can reach out to the patient they need the following:  - MRI report  - Order stating on it need for epidural injection  - demographic sheet    Fax number [phone]

## 2019-06-10 NOTE — TELEPHONE ENCOUNTER
All requested information/orders have been faxed. Left message for Bailey Corrigan to advise and to call with any questions or concerns.

## 2019-06-15 RX ORDER — BUPROPION HYDROCHLORIDE 150 MG/1
TABLET, EXTENDED RELEASE ORAL
Qty: 60 TABLET | Refills: 0 | Status: SHIPPED | OUTPATIENT
Start: 2019-06-15 | End: 2019-07-03

## 2019-06-15 NOTE — TELEPHONE ENCOUNTER
Medication:   Requested Prescriptions     Pending Prescriptions Disp Refills    buPROPion (ZYBAN) 150 MG extended release tablet [Pharmacy Med Name: BUPROPION ER 150MG [facility])] 60 tablet 0     Sig: TAKE 1 TABLET BY MOUTH TWICE DAILY      Last Filled:  6/5/2018     Patient Phone Number: [phone] (home) [phone] (work)    Last appt: 5/29/2019   Next appt: Visit date not found    Last OARRS:   RX Monitoring 4/24/2019   Attestation The Prescription Monitoring Report for this patient was reviewed today. Periodic Controlled Substance Monitoring -   Chronic Pain > 50 MEDD Obtained or confirmened \"Consent of Opioid Use\" on file.        Preferred Pharmacy:   [facility] [address] [place] [address] [phone] [address] [address]  Phone: [phone] Fax: [phone]    [facility] [address]  [id number] [place] [address],[facility] [address] [address]  Phone: [phone] Fax: [phone]

## 2019-06-21 ENCOUNTER — TELEPHONE (OUTPATIENT)
Dept: FAMILY MEDICINE CLINIC | Age: 64
End: 2019-06-21

## 2019-06-21 NOTE — TELEPHONE ENCOUNTER
Patient has received her [place] and is not to return to work full time until 8-20-19 and she states her hip is better since her injection but her knee has seemed to have gotten worse . She wants to know does she need to be seen for this or what else do you suggest ?   Provider out of office . Please advise .

## 2019-06-25 DIAGNOSIS — M79.7 FIBROMYALGIA: ICD-10-CM

## 2019-06-25 RX ORDER — GABAPENTIN 300 MG/1
300 CAPSULE ORAL 3 TIMES DAILY
Qty: 270 CAPSULE | Refills: 3 | Status: SHIPPED | OUTPATIENT
Start: 2019-06-25 | End: 2020-01-03

## 2019-07-03 ENCOUNTER — OFFICE VISIT (OUTPATIENT)
Dept: FAMILY MEDICINE CLINIC | Age: 64
End: 2019-07-03
Payer: COMMERCIAL

## 2019-07-03 VITALS
HEART RATE: 88 BPM | SYSTOLIC BLOOD PRESSURE: 126 MMHG | DIASTOLIC BLOOD PRESSURE: 78 MMHG | WEIGHT: 186 LBS | BODY MASS INDEX: 29.04 KG/M2 | OXYGEN SATURATION: 95 %

## 2019-07-03 DIAGNOSIS — M54.31 SCIATICA OF RIGHT SIDE: Primary | ICD-10-CM

## 2019-07-03 PROCEDURE — 99213 OFFICE O/P EST LOW 20 MIN: CPT | Performed by: INTERNAL MEDICINE

## 2019-07-03 RX ORDER — ALPRAZOLAM 0.5 MG/1
0.5 TABLET ORAL 2 TIMES DAILY PRN
COMMUNITY
End: 2019-10-21

## 2019-07-03 ASSESSMENT — ENCOUNTER SYMPTOMS
COUGH: 0
SHORTNESS OF BREATH: 0
BACK PAIN: 1

## 2019-07-05 ENCOUNTER — TELEPHONE (OUTPATIENT)
Dept: FAMILY MEDICINE CLINIC | Age: 64
End: 2019-07-05

## 2019-07-05 DIAGNOSIS — M51.26 HERNIATION OF RIGHT SIDE OF L4-L5 INTERVERTEBRAL DISC: Primary | ICD-10-CM

## 2019-07-11 ENCOUNTER — TELEPHONE (OUTPATIENT)
Dept: FAMILY MEDICINE CLINIC | Age: 64
End: 2019-07-11

## 2019-07-23 ENCOUNTER — OFFICE VISIT (OUTPATIENT)
Dept: RHEUMATOLOGY | Age: 64
End: 2019-07-23
Payer: COMMERCIAL

## 2019-07-23 VITALS
DIASTOLIC BLOOD PRESSURE: 82 MMHG | WEIGHT: 187 LBS | HEART RATE: 72 BPM | BODY MASS INDEX: 29.35 KG/M2 | HEIGHT: 67 IN | SYSTOLIC BLOOD PRESSURE: 124 MMHG

## 2019-07-23 DIAGNOSIS — M79.7 FIBROMYALGIA: Primary | ICD-10-CM

## 2019-07-23 PROCEDURE — 99213 OFFICE O/P EST LOW 20 MIN: CPT | Performed by: INTERNAL MEDICINE

## 2019-07-24 RX ORDER — PRAVASTATIN SODIUM 20 MG
20 TABLET ORAL EVERY EVENING
Qty: 30 TABLET | Refills: 0 | Status: SHIPPED | OUTPATIENT
Start: 2019-07-24 | End: 2019-09-13 | Stop reason: SDUPTHER

## 2019-07-24 NOTE — TELEPHONE ENCOUNTER
Medication:   Requested Prescriptions     Pending Prescriptions Disp Refills    pravastatin (PRAVACHOL) 20 MG tablet [Pharmacy Med Name: PRAVASTATIN 20MG TABLETS] 30 tablet 0     Sig: TAKE 1 TABLET BY MOUTH EVERY EVENING      Last Filled:  4/10/2019    Patient Phone Number: [phone] (home) [phone] (work)    Last appt: 7/3/2019   Next appt: 8/6/2019    Last OARRS:   RX Monitoring 4/24/2019   Attestation The Prescription Monitoring Report for this patient was reviewed today. Periodic Controlled Substance Monitoring -   Chronic Pain > 50 MEDD Obtained or confirmened \"Consent of Opioid Use\" on file.        Preferred Pharmacy:   [place] [address]  [address]  Phone: [phone] Fax: [phone]    [facility] [phone] [facility] [phone]  [address] [place] [address]  [address] [address]  Phone: [phone] Fax: [phone]

## 2019-08-06 ENCOUNTER — OFFICE VISIT (OUTPATIENT)
Dept: FAMILY MEDICINE CLINIC | Age: 64
End: 2019-08-06
Payer: COMMERCIAL

## 2019-08-06 VITALS
BODY MASS INDEX: 29.38 KG/M2 | DIASTOLIC BLOOD PRESSURE: 82 MMHG | WEIGHT: 188.2 LBS | OXYGEN SATURATION: 96 % | HEART RATE: 69 BPM | SYSTOLIC BLOOD PRESSURE: 128 MMHG

## 2019-08-06 DIAGNOSIS — M51.36 L4-L5 DISC BULGE: Primary | ICD-10-CM

## 2019-08-06 PROCEDURE — 99212 OFFICE O/P EST SF 10 MIN: CPT | Performed by: INTERNAL MEDICINE

## 2019-08-06 ASSESSMENT — ENCOUNTER SYMPTOMS
SHORTNESS OF BREATH: 0
BACK PAIN: 0
COUGH: 0

## 2019-09-04 DIAGNOSIS — G47.00 INSOMNIA, UNSPECIFIED TYPE: ICD-10-CM

## 2019-09-04 RX ORDER — ALPRAZOLAM 0.5 MG/1
TABLET ORAL
Qty: 45 TABLET | Refills: 0 | Status: SHIPPED | OUTPATIENT
Start: 2019-09-04 | End: 2019-10-18 | Stop reason: SDUPTHER

## 2019-09-10 ENCOUNTER — OFFICE VISIT (OUTPATIENT)
Dept: FAMILY MEDICINE CLINIC | Age: 64
End: 2019-09-10
Payer: MEDICARE

## 2019-09-10 VITALS
BODY MASS INDEX: 29.16 KG/M2 | HEART RATE: 66 BPM | OXYGEN SATURATION: 96 % | SYSTOLIC BLOOD PRESSURE: 124 MMHG | WEIGHT: 186.8 LBS | DIASTOLIC BLOOD PRESSURE: 72 MMHG

## 2019-09-10 DIAGNOSIS — G89.29 CHRONIC LOW BACK PAIN WITH SCIATICA, SCIATICA LATERALITY UNSPECIFIED, UNSPECIFIED BACK PAIN LATERALITY: Chronic | ICD-10-CM

## 2019-09-10 DIAGNOSIS — I10 HYPERTENSION, UNSPECIFIED TYPE: Primary | ICD-10-CM

## 2019-09-10 DIAGNOSIS — M54.40 CHRONIC LOW BACK PAIN WITH SCIATICA, SCIATICA LATERALITY UNSPECIFIED, UNSPECIFIED BACK PAIN LATERALITY: Chronic | ICD-10-CM

## 2019-09-10 PROCEDURE — 99212 OFFICE O/P EST SF 10 MIN: CPT | Performed by: INTERNAL MEDICINE

## 2019-09-10 ASSESSMENT — ENCOUNTER SYMPTOMS
SHORTNESS OF BREATH: 0
COUGH: 0

## 2019-09-10 NOTE — PROGRESS NOTES
Feng Huynh  : 1955  Encounter date: 9/10/2019    This dominick 71 y.o. female who presents with  Chief Complaint   Patient presents with    1 Month Follow-Up       History of present illness:    HPI Patient presents today for her 4 week follow up. Pt reports she has been doing okay in regards to her back. She feels constantly on Angeliq the pain is going to flareup again, although for the most part it is tolerable. She reports she is able to work to 8-hour shifts without any issues during the shift, but she feels exhausted and needs a day or so to recover afterwards. She reports occasionally she does take Tylenol for the pain, but generally she is okay. She does not really use the tramadol anymore. She is nervous about increasing the length of her work shifts, as she thinks she would fatigue too much by the end of the shift and reinjure her back. Allergies   Allergen Reactions    Darvocet [Propoxyphene N-Acetaminophen]     Lipitor [Atorvastatin]      Leg cramping    Diclofenac Rash     Current Outpatient Medications   Medication Sig Dispense Refill    ALPRAZolam (XANAX) 0.5 MG tablet TAKE 1/2 TABLET BY MOUTH EVERY MORNING AND 1 EVERY EVENING 45 tablet 0    pravastatin (PRAVACHOL) 20 MG tablet TAKE 1 TABLET BY MOUTH EVERY EVENING 30 tablet 0    ALPRAZolam (XANAX) 0.5 MG tablet Take 0.5 mg by mouth 2 times daily as needed for Anxiety. Take half a tablet in a.m and whole tablet at night.  gabapentin (NEURONTIN) 300 MG capsule Take 1 capsule by mouth 3 times daily for 90 days.  270 capsule 3    cyclobenzaprine (FLEXERIL) 10 MG tablet Take 1 tablet by mouth 3 times daily as needed for Muscle spasms 30 tablet 0    metoprolol succinate (TOPROL XL) 25 MG extended release tablet TAKE ONE TABLET BY MOUTH 2 TIMES A  tablet 3    ramipril (ALTACE) 10 MG capsule TAKE ONE CAPSULE BY MOUTH EVERY DAY 90 capsule 3    furosemide (LASIX) 80 MG tablet TAKE ONE TABLET BY MOUTH EVERY DAY (Patient taking

## 2019-09-13 DIAGNOSIS — I10 HYPERTENSION, UNSPECIFIED TYPE: Primary | ICD-10-CM

## 2019-09-13 RX ORDER — PRAVASTATIN SODIUM 20 MG
20 TABLET ORAL EVERY EVENING
Qty: 90 TABLET | Refills: 0 | Status: SHIPPED | OUTPATIENT
Start: 2019-09-13 | End: 2019-12-12 | Stop reason: SDUPTHER

## 2019-09-26 RX ORDER — FUROSEMIDE 80 MG
TABLET ORAL
Qty: 90 TABLET | Refills: 1 | Status: SHIPPED | OUTPATIENT
Start: 2019-09-26 | End: 2020-03-27 | Stop reason: SDUPTHER

## 2019-10-18 DIAGNOSIS — G47.00 INSOMNIA, UNSPECIFIED TYPE: ICD-10-CM

## 2019-10-18 RX ORDER — ALPRAZOLAM 0.5 MG/1
TABLET ORAL
Qty: 45 TABLET | Refills: 0 | Status: SHIPPED | OUTPATIENT
Start: 2019-10-18 | End: 2019-12-10 | Stop reason: SDUPTHER

## 2019-10-21 ENCOUNTER — OFFICE VISIT (OUTPATIENT)
Dept: FAMILY MEDICINE CLINIC | Age: 64
End: 2019-10-21
Payer: MEDICARE

## 2019-10-21 VITALS
HEART RATE: 76 BPM | OXYGEN SATURATION: 96 % | SYSTOLIC BLOOD PRESSURE: 124 MMHG | BODY MASS INDEX: 28.26 KG/M2 | DIASTOLIC BLOOD PRESSURE: 80 MMHG | WEIGHT: 181 LBS

## 2019-10-21 DIAGNOSIS — N95.9 POST MENOPAUSAL PROBLEMS: ICD-10-CM

## 2019-10-21 DIAGNOSIS — M51.36 L4-L5 DISC BULGE: Primary | ICD-10-CM

## 2019-10-21 PROCEDURE — 99213 OFFICE O/P EST LOW 20 MIN: CPT | Performed by: NURSE PRACTITIONER

## 2019-10-21 ASSESSMENT — ENCOUNTER SYMPTOMS
SHORTNESS OF BREATH: 0
BACK PAIN: 1

## 2019-11-01 ENCOUNTER — HOSPITAL ENCOUNTER (OUTPATIENT)
Dept: GENERAL RADIOLOGY | Age: 64
Discharge: HOME | End: 2019-11-01
Payer: MEDICARE

## 2019-11-01 ENCOUNTER — TELEPHONE (OUTPATIENT)
Dept: FAMILY MEDICINE CLINIC | Age: 64
End: 2019-11-01

## 2019-11-01 DIAGNOSIS — N95.9 POST MENOPAUSAL PROBLEMS: ICD-10-CM

## 2019-11-01 DIAGNOSIS — G89.29 CHRONIC LOW BACK PAIN WITH SCIATICA, SCIATICA LATERALITY UNSPECIFIED, UNSPECIFIED BACK PAIN LATERALITY: Primary | ICD-10-CM

## 2019-11-01 DIAGNOSIS — M54.40 CHRONIC LOW BACK PAIN WITH SCIATICA, SCIATICA LATERALITY UNSPECIFIED, UNSPECIFIED BACK PAIN LATERALITY: Primary | ICD-10-CM

## 2019-11-01 PROCEDURE — 77080 DXA BONE DENSITY AXIAL: CPT

## 2019-11-01 RX ORDER — CYCLOBENZAPRINE HCL 10 MG
10 TABLET ORAL 3 TIMES DAILY PRN
Qty: 30 TABLET | Refills: 0 | Status: SHIPPED | OUTPATIENT
Start: 2019-11-01 | End: 2019-11-11

## 2019-12-09 DIAGNOSIS — G47.00 INSOMNIA, UNSPECIFIED TYPE: ICD-10-CM

## 2019-12-10 RX ORDER — ALPRAZOLAM 0.5 MG/1
TABLET ORAL
Qty: 45 TABLET | Refills: 0 | Status: SHIPPED | OUTPATIENT
Start: 2019-12-10 | End: 2020-01-09

## 2019-12-12 DIAGNOSIS — I10 HYPERTENSION, UNSPECIFIED TYPE: ICD-10-CM

## 2019-12-12 RX ORDER — PRAVASTATIN SODIUM 20 MG
20 TABLET ORAL EVERY EVENING
Qty: 90 TABLET | Refills: 0 | Status: SHIPPED | OUTPATIENT
Start: 2019-12-12 | End: 2020-03-12

## 2019-12-18 ENCOUNTER — OFFICE VISIT (OUTPATIENT)
Dept: FAMILY MEDICINE CLINIC | Age: 64
End: 2019-12-18
Payer: MEDICARE

## 2019-12-18 VITALS
OXYGEN SATURATION: 96 % | BODY MASS INDEX: 28.1 KG/M2 | DIASTOLIC BLOOD PRESSURE: 84 MMHG | HEART RATE: 76 BPM | WEIGHT: 180 LBS | SYSTOLIC BLOOD PRESSURE: 160 MMHG

## 2019-12-18 DIAGNOSIS — M47.896 OTHER SPONDYLOSIS, LUMBAR REGION: Primary | ICD-10-CM

## 2019-12-18 PROCEDURE — 99213 OFFICE O/P EST LOW 20 MIN: CPT | Performed by: NURSE PRACTITIONER

## 2019-12-18 RX ORDER — METHOCARBAMOL 500 MG/1
500 TABLET, FILM COATED ORAL 4 TIMES DAILY
Qty: 40 TABLET | Refills: 0 | Status: SHIPPED | OUTPATIENT
Start: 2019-12-18 | End: 2019-12-28

## 2019-12-18 ASSESSMENT — ENCOUNTER SYMPTOMS
BACK PAIN: 1
SHORTNESS OF BREATH: 0

## 2019-12-27 ENCOUNTER — TELEPHONE (OUTPATIENT)
Dept: FAMILY MEDICINE CLINIC | Age: 64
End: 2019-12-27

## 2019-12-30 NOTE — TELEPHONE ENCOUNTER
What injection are you talking about specifically. I do not see on your chart the medication you are referring to. ..

## 2020-01-02 ENCOUNTER — TELEPHONE (OUTPATIENT)
Dept: FAMILY MEDICINE CLINIC | Age: 65
End: 2020-01-02

## 2020-01-02 NOTE — TELEPHONE ENCOUNTER
Patient requesting third lumbar epidural injection through  Neuro. Advised that as a PCP unable to order epidurals. Referral sent to [facility] [address] Surgery for treatment. Pt notified and provided appointment instructions.

## 2020-01-02 NOTE — TELEPHONE ENCOUNTER
Spoke with Blake Villeda, she informed me it would be best to refer patient to orthopedic surgeon. referral placed in [facility], Patient advised.

## 2020-01-03 ENCOUNTER — OFFICE VISIT (OUTPATIENT)
Dept: ORTHOPEDIC SURGERY | Age: 65
End: 2020-01-03
Payer: MEDICARE

## 2020-01-03 VITALS
SYSTOLIC BLOOD PRESSURE: 151 MMHG | HEIGHT: 67 IN | WEIGHT: 179.9 LBS | HEART RATE: 68 BPM | BODY MASS INDEX: 28.24 KG/M2 | DIASTOLIC BLOOD PRESSURE: 88 MMHG

## 2020-01-03 PROCEDURE — 99203 OFFICE O/P NEW LOW 30 MIN: CPT | Performed by: PHYSICIAN ASSISTANT

## 2020-01-03 PROCEDURE — 20611 DRAIN/INJ JOINT/BURSA W/US: CPT | Performed by: PHYSICIAN ASSISTANT

## 2020-01-03 RX ORDER — METHOCARBAMOL 500 MG/1
500 TABLET, FILM COATED ORAL 3 TIMES DAILY
COMMUNITY
End: 2020-04-27 | Stop reason: CLARIF

## 2020-01-03 RX ORDER — GABAPENTIN 300 MG/1
300 CAPSULE ORAL 3 TIMES DAILY
COMMUNITY
End: 2020-09-30 | Stop reason: SDUPTHER

## 2020-01-03 NOTE — PROGRESS NOTES
is no paraspinal spasm. · Range of Motion: limited by 50% in all planes due to pain  · Strength:   Strength testing is 5/5 in all muscle groups tested. · Special Tests:   Straight leg raise positive right and negative left and crossed SLR negative. Gerardo's testing is negative bilaterally. FADIR's testing is negative bilaterally. · Skin: There are no rashes, ulcerations or lesions. · Reflexes: Reflexes are symmetrically 1+ at the patellar and ankle tendons. Clonus absent bilaterally at the feet. · Gait & station: normal, patient ambulates without assistance with no ataxia. · Additional Examinations:  · RIGHT LOWER EXTREMITY: Inspection/examination of the right lower extremity does not show any tenderness, deformity or injury. Range of motion is unremarkable. There is no gross instability. There are no rashes, ulcerations or lesions. Strength and tone are normal. No atrophy or abnormal movements are noted. · LEFT LOWER EXTREMITY:  Inspection/examination of the left lower extremity does not show any tenderness, deformity or injury. Range of motion is unremarkable. There is no gross instability. There are no rashes, ulcerations or lesions. Strength and tone are normal. No atrophy or abnormal movements are noted. Diagnostic Testing:    Xrays:   None  MRI or CT:  MRI of the Lumbar Spine from 6/4/19:   Impression:  1. Marked focal right L4-5 foraminal disc herniation/extrusion with severe foraminal narrowing and exiting right L4 nerve root sleeve compression. Mild anterior listhesis and central canal stenosis at this level. 2.  Mild disc bulging elsewhere with marked mid to lower lumbar hypertrophic facet arthropathy demonstrated.     EMG:  None  Results for orders placed or performed in visit on 05/21/19   Hepatitis C Antibody   Result Value Ref Range    Hep C Ab Interp Non-reactive Non-reactive   Vitamin D 25 Hydroxy   Result Value Ref Range    Vit D, 25-Hydroxy 29.8 (L) >=30 ng/mL   TSH without Reflex Result Value Ref Range    TSH 1.19 0.27 - 4.20 uIU/mL   CK   Result Value Ref Range    Total CK 71 26 - 192 U/L       Impression (Medical Decision Making):       1. Lumbar radiculopathy    2. HNP (herniated nucleus pulposus), lumbar    3. Paresthesia of right lower extremity    4. Greater trochanteric bursitis of right hip        Plan (Medical Decision Making):    I discussed the diagnosis and the treatment options with Corie Kanchan today. In Summary:  The various treatment options were outlined and discussed with Randa Zepeda including:  Conservative care options: physical therapy, ice, medications, bracing, and activity modification. The indications for therapeutic injections. The indications for additional imaging/laboratory studies. The indications for (possible future) interventions. After considering the various options discussed, Corie Hemphill elected to pursue a course of treatment that includes the followin. Medications: No further recommendations for new medications. 2. PT:  Encouraged to continue with HEP. 3. Further studies:  I will obtain an EMG to evaluate for paresthesias to rule out a nerve entrapment or a more proximal etiology. RLE. 4. Interventional:  After risks benefits alternatives discussed a right greater trochanter bursa injection was undertaken the bedside. The skin overlying the right hip was prepped with ChloraPrep ×3. A mixture of 80 mg of Kenalog with 4 ml of 1% lidocaine was drawn up and instilled over the most tender point of the right greater trochanter with a 22-gauge needle using a SonoSite curvilinear array transducer with an in plane technique. The needle was removed after the mixture was instilled and a Band-Aid was applied.      5. Healthy Lifestyle Measures:  Patient education material reviewing the following was distributed to Corie Hemphill  Anatomic drawings  Healthy lifestyle education  Osteoporosis prevention,   Back and neck pain

## 2020-01-03 NOTE — PROGRESS NOTES
1/3/20 9:48 AM         NDC: 8707-1968-87   -   KENALOG    Lot Number: [id number]       Comments: R GREATER TROCHANTER BURSA INJECTION         NDC: 3328-6893-58   -   LIDOCAINE 1%    Lot Number: [id number]       Comments: R GREATER TROCHANTER BURSA INJECTION

## 2020-01-16 ENCOUNTER — OFFICE VISIT (OUTPATIENT)
Dept: ORTHOPEDIC SURGERY | Age: 65
End: 2020-01-16
Payer: MEDICARE

## 2020-01-16 PROCEDURE — 95886 MUSC TEST DONE W/N TEST COMP: CPT | Performed by: PHYSICAL MEDICINE & REHABILITATION

## 2020-01-16 PROCEDURE — 95908 NRV CNDJ TST 3-4 STUDIES: CPT | Performed by: PHYSICAL MEDICINE & REHABILITATION

## 2020-01-16 NOTE — PROGRESS NOTES
studies suggestive of anterior tarsal tunnel syndrome or an anterior peroneal neuropathy at the ankle. 3. Prolonged sural latency may be suggestive of a mild peripheral neuropathy. 4. The tibial nerve study showed no evoked response most likely due to atrophy of the right AHB. Mila Fields.  Kwan Arroyo MD.

## 2020-01-20 ENCOUNTER — OFFICE VISIT (OUTPATIENT)
Dept: ORTHOPEDIC SURGERY | Age: 65
End: 2020-01-20
Payer: MEDICARE

## 2020-01-20 VITALS
HEIGHT: 67 IN | DIASTOLIC BLOOD PRESSURE: 69 MMHG | WEIGHT: 179.9 LBS | SYSTOLIC BLOOD PRESSURE: 114 MMHG | HEART RATE: 78 BPM | BODY MASS INDEX: 28.24 KG/M2

## 2020-01-20 PROCEDURE — 99214 OFFICE O/P EST MOD 30 MIN: CPT | Performed by: PHYSICIAN ASSISTANT

## 2020-01-20 NOTE — PROGRESS NOTES
Patient is adequately groomed with no evidence of malnutrition. · Psychiatric: Orientation: The patient is oriented to time, place and person. The patient's mood and affect are appropriate   · Vascular: Examination reveals no swelling and palpation reveals no tenderness in upper or lower extremities. Good capillary refill. · The lymphatic examination of the neck, axillae and groin reveals all areas to be without enlargement or induration  · Sensation is intact without deficit in the upper and lower extremities to light touch and pinprick  · Coordination of the upper and lower extremities are normal.  · RIGHT UPPER EXTREMITY:  Inspection/examination of the right upper extremity does not show any tenderness, deformity or injury. Range of motion is unremarkable and pain-free. There is no gross instability. There are no rashes, ulcerations or lesions. Strength and tone are normal. No atrophy or abnormal movements are noted. · LEFT UPPER EXTREMITY: Inspection/examination of the left upper extremity does not show any tenderness, deformity or injury. Range of motion is unremarkable and pain-free. There is no gross instability. There are no rashes, ulcerations or lesions. Strength and tone are normal. No atrophy or abnormal movements are noted. LUMBAR/SACRAL EXAMINATION:  · Inspection: Local inspection shows no step-off or bruising. Lumbar alignment is normal. No instability is noted. · Palpation:   No evidence of tenderness at the midline. Lumbar paraspinal tenderness: Mild L4/5 and L5/S1 tenderness  Bursal tenderness No tenderness bilaterally  There is no paraspinal spasm. · Range of Motion: limited by 50% in all planes due to pain  · Strength:   Strength testing is 5/5 in all muscle groups tested. · Special Tests:   Straight leg raise and crossed SLR negative. Gerardo's testing is negative bilaterally. FADIR's testing is negative bilaterally.   · Skin: There are no rashes, ulcerations or lesions. · Reflexes: Reflexes are symmetrically 1+ at the patellar and ankle tendons. Clonus absent bilaterally at the feet. · Gait & station: normal, patient ambulates without assistance and no ataxia  · Additional Examinations:  · RIGHT LOWER EXTREMITY: Inspection/examination of the right lower extremity does not show any tenderness, deformity or injury. Range of motion is normal and pain-free. There is no gross instability. There are no rashes, ulcerations or lesions. Strength and tone are normal. No atrophy or abnormal movements are noted. · LEFT LOWER EXTREMITY:  Inspection/examination of the left lower extremity does not show any tenderness, deformity or injury. Range of motion is normal and pain-free. There is no gross instability. There are no rashes, ulcerations or lesions. Strength and tone are normal. No atrophy or abnormal movements are noted. Diagnostic Testing:    EMG of the Right Lower Extremity:   Nerve Conduction Studies - Right Lower Extremity  Recording over the right EDB, the peroneal motor NCS shows no response. Recording off the TA, the peroneal motor NCS shows normal onset latency, amplitude and conduction velocity. Recording over the right AHB, the tibial motor NCS shows no response. The right sural nerve shows prolonged peak latency and normal amplitude. Atrophy noted in the small muscles of the foot.        Impression:   1. Electrodiagnostic studies suggestive of a possible right L4 radiculopathy. 2. Electrodiagnostic studies suggestive of anterior tarsal tunnel syndrome or an anterior peroneal neuropathy at the ankle. 3. Prolonged sural latency may be suggestive of a mild peripheral neuropathy.   4. The tibial nerve study showed no evoked response most likely due to atrophy of the right AHB.      Results for orders placed or performed in visit on 05/21/19   Hepatitis C Antibody   Result Value Ref Range    Hep C Ab Interp Non-reactive Non-reactive   Vitamin D 25 Hydroxy her condition or if she needs assistance in scheduling the above tests. She is welcome to call for an appointment sooner if she has any additional concerns or questions. I, Katie Kramer ATC, am scribing for and in the presence of Ernesto Pavon. [address], [address].  01/20/20 3:34 PM Katie Gruber Shows. The physical examination was performed between the patient and Ernesto Pavon. DAYNA Chino All counseling during the appointment was performed between the patient and the provider. Bernadine Lesches Iaciofano, PA-C, personally performed the services described in this documentation as scribed by Katie Kramer ATC in my presence and it is both accurate and complete. PAULIE Luu, PA-C  Board Certified by the [employer] on Certification of Physician Assistants  Shae Bridges 57  Partner of [facility] ([facility])         This dictation was performed with a verbal recognition program [facility]) and it was checked for errors. It is possible that there are still dictated errors within this office note. If so, please bring any errors to my attention for an addendum. All efforts were made to ensure that this office note is accurate.

## 2020-01-20 NOTE — LETTER
Please schedule the following with:     Date:   20 @ 900    Account: [de-identified]  Patient: Yumiko Rosas    : 1955  Address:  [address] [place] [address]    Phone (H):  [phone] (home) [phone] (work)     ----------------------------------------------------------------------------------------------  Diagnosis:     ICD-10-CM    1. Lumbar radiculopathy M54.16    2. HNP (herniated nucleus pulposus), lumbar M51.26    3. Paresthesia of right lower extremity R20.2    4. Greater trochanteric bursitis of right hip M70.61      Procedure: Transforaminal Epidural Steroid Injection     Levels: L4 and L5   Side: Right   CPT Codes 52531, 19992  ----------------------------------------------------------------------------------------------  Injection # 1  MFASC    Attending Physician       Dc Nevarez MD.  ----------------------------------------------------------------------------------------------  Injection Scheduled For:    At:    [facility]  Pre-Cert#    [employer] Insurance     Pre-Cert#    Comments:    · Infection control  · Tested positive for MRSA in past 12 months:  no  · Tested positive for MSSA \"staph infection\" in past 12 months: no  · Tested positive for VRE (Vancomycin Resistant Enterococci) in past 12 months:   no  · Currently on any antibiotics for an infection: no  · Anticoagulants:  · On a blood thinner:  no   · Any history of bleeding disorder: no   · Advanced Liver disease: no   · Advanced Renal disease: no   · Glaucoma: no   · Diabetes: no     Sedation:  Yes  -----------------------------------------------------------------------------------------------  Allergies   Allergen Reactions    Darvocet [Propoxyphene N-Acetaminophen]     Lipitor [Atorvastatin]      Leg cramping    Diclofenac Rash

## 2020-01-21 ENCOUNTER — OFFICE VISIT (OUTPATIENT)
Dept: RHEUMATOLOGY | Age: 65
End: 2020-01-21
Payer: MEDICARE

## 2020-01-21 VITALS
SYSTOLIC BLOOD PRESSURE: 128 MMHG | WEIGHT: 170.13 LBS | TEMPERATURE: 98.3 F | HEIGHT: 67 IN | DIASTOLIC BLOOD PRESSURE: 72 MMHG | BODY MASS INDEX: 26.7 KG/M2 | HEART RATE: 72 BPM

## 2020-01-21 PROCEDURE — 99213 OFFICE O/P EST LOW 20 MIN: CPT | Performed by: INTERNAL MEDICINE

## 2020-01-21 RX ORDER — AZITHROMYCIN 250 MG/1
250 TABLET, FILM COATED ORAL SEE ADMIN INSTRUCTIONS
Qty: 6 TABLET | Refills: 0 | Status: SHIPPED | OUTPATIENT
Start: 2020-01-21 | End: 2020-01-26

## 2020-01-21 RX ORDER — ALPRAZOLAM 0.5 MG/1
0.5 TABLET ORAL NIGHTLY PRN
COMMUNITY
End: 2020-01-27 | Stop reason: SDUPTHER

## 2020-01-21 NOTE — PROGRESS NOTES
Subjective:      Patient ID: Krystyna Beasley is a 71 y.o. female. HPI  The patient returns for follow-up of spinal stenosis. She continues on Neurontin 300 mg 3 times daily. She recently saw an orthopedist who gave her an injection in her bursa with some improvement and she is scheduled for another epidural steroid injection at L4-5. Her main complaint today is upper respiratory symptoms. She has purulent sputum  Review of Systems  Denies fever  Objective:   Physical Exam /72   Pulse 72   Temp 98.3 °F (36.8 °C) (Oral)   Ht 5' 7.13\" (1.705 m)   Wt 170 lb 2 oz (77.2 kg)   BMI 26.54 kg/m²   Alert female no acute distress lungs clear to auscultation. Cardiovascular exam normal sinus rhythm. Tenderness over the maxillary sinuses no tenderness over the frontal sinuses    Assessment:      Spinal stenosis. Sinusitis      Plan:      The patient will be given a Z-Jos. I will see her back in 6 months time.         Bakari Gregg MD

## 2020-01-27 RX ORDER — ALPRAZOLAM 0.5 MG/1
0.5 TABLET ORAL NIGHTLY PRN
Qty: 30 TABLET | Refills: 0 | Status: SHIPPED | OUTPATIENT
Start: 2020-01-27 | End: 2020-02-25 | Stop reason: SDUPTHER

## 2020-01-27 NOTE — TELEPHONE ENCOUNTER
Medication:   Requested Prescriptions     Pending Prescriptions Disp Refills    ALPRAZolam (XANAX) 0.5 MG tablet       Sig: Take 1 tablet by mouth nightly as needed for Sleep. Last Filled:      Patient Phone Number: [phone] (home) [phone] (work)    Last appt: 12/18/2019   Next appt: 3/18/2020    Last OARRS:   RX Monitoring 4/24/2019   Attestation The Prescription Monitoring Report for this patient was reviewed today. Periodic Controlled Substance Monitoring -   Chronic Pain > 50 MEDD Obtained or confirmened \"Consent of Opioid Use\" on file.        Preferred Pharmacy:   [address] [place] [address] [phone] - F [phone]  [address] [place] [phone]  Phone: [phone] Fax: [phone]    [facility] [address], [address] [facility] [phone] [facility] [phone]  [address] [place] [address]  Phone: [phone] Fax: [phone]

## 2020-01-27 NOTE — TELEPHONE ENCOUNTER
Disp Refills Start End    ALPRAZolam (XANAX) 0.5 MG tablet        Sig - Route:  Take 0.5 mg by mouth nightly as needed for Sleep. - Oral      [facility] [address], [address] [phone] - F [phone]        Please advise

## 2020-01-30 ENCOUNTER — TELEPHONE (OUTPATIENT)
Dept: ORTHOPEDIC SURGERY | Age: 65
End: 2020-01-30

## 2020-01-30 NOTE — TELEPHONE ENCOUNTER
DOS   02/12/2020  CPT   10049  17378  DX   M54.16   M51.26   R20.2  M70.61  OP SX AUTH  [id number]  VALID   02/02/2020 - 02/25/2020    RIGHT  LEVELS   L4  L5    PROCEDURE   TRANS FORAMINAL LUIS DANIEL    [address] [place] [address],[facility]:   [place] [id number]

## 2020-02-12 ENCOUNTER — HOSPITAL ENCOUNTER (OUTPATIENT)
Age: 65
Setting detail: OUTPATIENT SURGERY
Discharge: HOME | End: 2020-02-12
Attending: PHYSICAL MEDICINE & REHABILITATION | Admitting: PHYSICAL MEDICINE & REHABILITATION
Payer: MEDICARE

## 2020-02-12 ENCOUNTER — APPOINTMENT (OUTPATIENT)
Dept: GENERAL RADIOLOGY | Age: 65
End: 2020-02-12
Attending: PHYSICAL MEDICINE & REHABILITATION
Payer: MEDICARE

## 2020-02-12 VITALS
WEIGHT: 170 LBS | TEMPERATURE: 97.5 F | HEIGHT: 68 IN | HEART RATE: 63 BPM | BODY MASS INDEX: 25.76 KG/M2 | DIASTOLIC BLOOD PRESSURE: 89 MMHG | OXYGEN SATURATION: 98 % | SYSTOLIC BLOOD PRESSURE: 124 MMHG | RESPIRATION RATE: 16 BRPM

## 2020-02-12 PROCEDURE — 2500000003 HC RX 250 WO HCPCS: Performed by: PHYSICAL MEDICINE & REHABILITATION

## 2020-02-12 PROCEDURE — 3209999900 FLUORO FOR SURGICAL PROCEDURES

## 2020-02-12 PROCEDURE — 2709999900 HC NON-CHARGEABLE SUPPLY: Performed by: PHYSICAL MEDICINE & REHABILITATION

## 2020-02-12 PROCEDURE — 6360000002 HC RX W HCPCS: Performed by: PHYSICAL MEDICINE & REHABILITATION

## 2020-02-12 PROCEDURE — 3610000056 HC PAIN LEVEL 4 BASE (NON-OR): Performed by: PHYSICAL MEDICINE & REHABILITATION

## 2020-02-12 PROCEDURE — 99152 MOD SED SAME PHYS/QHP 5/>YRS: CPT | Performed by: PHYSICAL MEDICINE & REHABILITATION

## 2020-02-12 RX ORDER — MIDAZOLAM HYDROCHLORIDE 1 MG/ML
INJECTION INTRAMUSCULAR; INTRAVENOUS
Status: COMPLETED | OUTPATIENT
Start: 2020-02-12 | End: 2020-02-12

## 2020-02-12 RX ORDER — BUPIVACAINE HYDROCHLORIDE 5 MG/ML
INJECTION, SOLUTION EPIDURAL; INTRACAUDAL
Status: COMPLETED | OUTPATIENT
Start: 2020-02-12 | End: 2020-02-12

## 2020-02-12 RX ORDER — LIDOCAINE HYDROCHLORIDE 10 MG/ML
INJECTION, SOLUTION INFILTRATION; PERINEURAL
Status: COMPLETED | OUTPATIENT
Start: 2020-02-12 | End: 2020-02-12

## 2020-02-12 RX ORDER — BETAMETHASONE SODIUM PHOSPHATE AND BETAMETHASONE ACETATE 3; 3 MG/ML; MG/ML
INJECTION, SUSPENSION INTRA-ARTICULAR; INTRALESIONAL; INTRAMUSCULAR; SOFT TISSUE
Status: COMPLETED | OUTPATIENT
Start: 2020-02-12 | End: 2020-02-12

## 2020-02-12 ASSESSMENT — PAIN SCALES - GENERAL
PAINLEVEL_OUTOF10: 0
PAINLEVEL_OUTOF10: 0

## 2020-02-12 ASSESSMENT — PAIN - FUNCTIONAL ASSESSMENT: PAIN_FUNCTIONAL_ASSESSMENT: 0-10

## 2020-02-12 NOTE — H&P
HISTORY AND PHYSICAL/PRE-SEDATION ASSESSMENT    Patient:  Ana Do   :  1955  Medical Record No.:  [id number]   Date:  2020  Physician:  Fior Cast M.D. Facility: [address] [place] [address]    HISTORY OF PRESENT ILLNESS:                 The patient is a 71 y.o. female whom presents with lower back and right leg pain. Review of the imaging and physical exam of the patient confirmed the pre-procedure diagnosis. After a thorough discussion of risks, benefits and alternatives informed consent was obtained. Past Medical History:   Past Medical History:   Diagnosis Date    Anxiety     Fibromyalgia     Gout     Hyperlipidemia     Hypertension     Osteoarthritis       Past Surgical History:     Past Surgical History:   Procedure Laterality Date    APPENDECTOMY  2019    HYSTERECTOMY      INNER EAR SURGERY      many    KNEE ARTHROSCOPY      RT     Current Medications:   Prior to Admission medications    Medication Sig Start Date End Date Taking? Authorizing Provider   ALPRAZolam Ozzy Samm) 0.5 MG tablet Take 1 tablet by mouth nightly as needed for Sleep for up to 30 days. 20 Yes PLACIDO Deleon NP   VITAMIN D PO Take by mouth   Yes Historical Provider, MD   methocarbamol (ROBAXIN) 500 MG tablet Take 500 mg by mouth 3 times daily   Yes Historical Provider, MD   gabapentin (NEURONTIN) 300 MG capsule Take 300 mg by mouth 3 times daily.    Yes Historical Provider, MD   pravastatin (PRAVACHOL) 20 MG tablet TAKE 1 TABLET BY MOUTH EVERY EVENING 19  Yes PLACIDO Deleon NP   furosemide (LASIX) 80 MG tablet TAKE ONE TABLET BY MOUTH EVERY DAY 19  Yes Ronald Delgadillo MD   metoprolol succinate (TOPROL XL) 25 MG extended release tablet TAKE ONE TABLET BY MOUTH 2 TIMES A DAY 4/3/19  Yes Ronald Delgadillo MD   ramipril (ALTACE) 10 MG capsule TAKE ONE CAPSULE BY MOUTH EVERY DAY 4/3/19  Yes Ronald Delgadillo MD [place].     Tri Mckeon M.D.

## 2020-02-21 NOTE — TELEPHONE ENCOUNTER
Disp Refills Start End    ALPRAZolam (XANAX) 0.5 MG tablet 30 tablet 0 1/27/2020 2/26/2020    Sig - Route:  Take 1 tablet by mouth nightly as needed for Sleep for up to 30 days. - Oral      Pharmacy:  [facility] [address], [address] [facility]  [phone] - F [phone]      Please advise

## 2020-02-25 RX ORDER — ALPRAZOLAM 0.5 MG/1
0.5 TABLET ORAL NIGHTLY PRN
Qty: 30 TABLET | Refills: 0 | Status: SHIPPED | OUTPATIENT
Start: 2020-02-25 | End: 2020-03-27 | Stop reason: SDUPTHER

## 2020-02-27 ENCOUNTER — OFFICE VISIT (OUTPATIENT)
Dept: ORTHOPEDIC SURGERY | Age: 65
End: 2020-02-27
Payer: MEDICARE

## 2020-02-27 VITALS
BODY MASS INDEX: 25.76 KG/M2 | SYSTOLIC BLOOD PRESSURE: 114 MMHG | HEART RATE: 75 BPM | WEIGHT: 169.97 LBS | DIASTOLIC BLOOD PRESSURE: 69 MMHG | HEIGHT: 68 IN

## 2020-02-27 PROCEDURE — 99213 OFFICE O/P EST LOW 20 MIN: CPT | Performed by: PHYSICIAN ASSISTANT

## 2020-02-27 NOTE — PROGRESS NOTES
is worse in the hip. The patient did have a greater trochanteric bursa injection in the right hip on 1/3/2020. This did help for about 4 weeks but her pain is slowly returning in that area. Associated signs and symptoms:   Neurogenic bowel or bladder symptoms:  no   Perceived weakness:  no   Difficulty walking:  yes              Past Medical History:   Past Medical History:   Diagnosis Date    Anxiety     Fibromyalgia     Gout     Hyperlipidemia     Hypertension     Osteoarthritis       Past Surgical History:     Past Surgical History:   Procedure Laterality Date    APPENDECTOMY  02/13/2019    HYSTERECTOMY      INNER EAR SURGERY      many    KNEE ARTHROSCOPY  2009    RT    PAIN MANAGEMENT PROCEDURE Right 2/12/2020    RIGHT L4 AND L5 TRANSFORAMINAL EPIDURAL STEROID INJECTION WITH FLUOROSCOPY (31372, 79292) performed by Alayna Mott MD at [facility]     Current Medications:     Current Outpatient Medications:     ALPRAZolam (XANAX) 0.5 MG tablet, Take 1 tablet by mouth nightly as needed for Sleep for up to 30 days. , Disp: 30 tablet, Rfl: 0    VITAMIN D PO, Take by mouth, Disp: , Rfl:     methocarbamol (ROBAXIN) 500 MG tablet, Take 500 mg by mouth 3 times daily, Disp: , Rfl:     gabapentin (NEURONTIN) 300 MG capsule, Take 300 mg by mouth 3 times daily. , Disp: , Rfl:     pravastatin (PRAVACHOL) 20 MG tablet, TAKE 1 TABLET BY MOUTH EVERY EVENING, Disp: 90 tablet, Rfl: 0    furosemide (LASIX) 80 MG tablet, TAKE ONE TABLET BY MOUTH EVERY DAY, Disp: 90 tablet, Rfl: 1    metoprolol succinate (TOPROL XL) 25 MG extended release tablet, TAKE ONE TABLET BY MOUTH 2 TIMES A DAY, Disp: 180 tablet, Rfl: 3    ramipril (ALTACE) 10 MG capsule, TAKE ONE CAPSULE BY MOUTH EVERY DAY, Disp: 90 capsule, Rfl: 3    acetaminophen (TYLENOL) 500 MG tablet, Take 1,000 mg by mouth 3 times daily, Disp: , Rfl:     Multiple Vitamins-Minerals (THERAPEUTIC MULTIVITAMIN-MINERALS) tablet, Take 1 tablet by mouth daily Centrum, unremarkable and pain-free. There is no gross instability. There are no rashes, ulcerations or lesions. Strength and tone are normal. No atrophy or abnormal movements are noted. LUMBAR/SACRAL EXAMINATION:  · Inspection: Local inspection shows no step-off or bruising. Lumbar alignment is normal. No instability is noted. · Palpation:   No evidence of tenderness at the midline. Lumbar paraspinal tenderness: No tenderness to palpation of the lumbar paraspinals  Bursal tenderness Right greater trochanteric tenderness  There is no paraspinal spasm. · Range of Motion: limited by 25% in all planes due to pain  · Strength:   Strength testing is 5/5 in all muscle groups tested. · Special Tests:   Straight leg raise and crossed SLR negative. Gerardo's testing is negative bilaterally. FADIR's testing is negative bilaterally. Bowstring test negative. Slump test negative. · Skin: There are no rashes, ulcerations or lesions. · Reflexes: Reflexes are symmetrically 2+ at the patellar and ankle tendons. Clonus absent bilaterally at the feet. · Gait & station: antalgic on the right and no ataxia  · Additional Examinations:  · RIGHT LOWER EXTREMITY: Inspection/examination of the right lower extremity does not show any tenderness, deformity or injury. Range of motion is normal and pain-free. There is no gross instability. There are no rashes, ulcerations or lesions. Strength and tone are normal. No atrophy or abnormal movements are noted. · LEFT LOWER EXTREMITY:  Inspection/examination of the left lower extremity does not show any tenderness, deformity or injury. Range of motion is normal and pain-free. There is no gross instability. There are no rashes, ulcerations or lesions. Strength and tone are normal. No atrophy or abnormal movements are noted. Diagnostic Testing:    MR Lumbar spine shows from 6/4/19:   Impression:  1.   Marked focal right L4-5 foraminal disc herniation/extrusion with severe foraminal narrowing and exiting right L4 nerve root sleeve compression. Mild anterior listhesis and central canal stenosis at this level. 2.  Mild disc bulging elsewhere with marked mid to lower lumbar hypertrophic facet arthropathy demonstrated. Results for orders placed or performed in visit on 19   Hepatitis C Antibody   Result Value Ref Range    Hep C Ab Interp Non-reactive Non-reactive   Vitamin D 25 Hydroxy   Result Value Ref Range    Vit D, 25-Hydroxy 29.8 (L) >=30 ng/mL   TSH without Reflex   Result Value Ref Range    TSH 1.19 0.27 - 4.20 uIU/mL   CK   Result Value Ref Range    Total CK 71 26 - 192 U/L     Impression:       1. Lumbar radiculopathy    2. HNP (herniated nucleus pulposus), lumbar    3. Paresthesia of right lower extremity    4. Greater trochanteric bursitis of right hip        Plan:  Clinical Course: Above diagnoses are improving ; diagnosis 4 worsening. I discussed the diagnosis and the treatment options with Trish Bennett today. In Summary:  The various treatment options were outlined and discussed with Randa Zepeda including:  Conservative care options: physical therapy, ice, medications, bracing, and activity modification. The indications for therapeutic injections. The indications for additional imaging/laboratory studies. The indications for (possible future) interventions. After considering the various options discussed, Trish Guajardo elected to pursue a course of treatment that includes the followin. Medications:  No further recommendations for new medications. 2. PT:  Prescribed home exercise program. This will be for the right hip bursitis as well as the right hip arthritis. 3. Further studies: No further studies. If the patient does not improve with exercises and stretches for the right hip we could proceed with a right hip MRI. The patient would like to hold on this additional testing at this time.     4. Interventional: The patient describes that she is 100% improved in the lower back and pain radiating down the right leg, she does still have pain at a 6/10 in severity in the right hip. The patient did have a right greater trochanteric bursa injection on 1/3/2020. We will not proceed with additional injections as not enough time has passed since her first.  We will follow-up with the patient in 4 weeks and at that time evaluate to determine if we need to do an injection at that time. 5. Follow up:  4 weeks. Hilario Lange was instructed to call the office if her symptoms worsen or if new symptoms appear prior to the next scheduled visit. She is specifically instructed to contact the office between now & her scheduled appointment if she has concerns related to her condition or if she needs assistance in scheduling the above tests. She is welcome to call for an appointment sooner if she has any additional concerns or questions. PAULIE James, PA-C  Board Certified by the [employer] on Certification of Physician Assistants  [address] [facility]  Partner of [facility] [place] ([facility])               This dictation was performed with a verbal recognition program [facility]) and it was checked for errors. It is possible that there are still dictated errors within this office note. If so, please bring any errors to my attention for an addendum. All efforts were made to ensure that this office note is accurate.

## 2020-03-12 RX ORDER — PRAVASTATIN SODIUM 20 MG
20 TABLET ORAL EVERY EVENING
Qty: 90 TABLET | Refills: 0 | Status: SHIPPED | OUTPATIENT
Start: 2020-03-12 | End: 2020-07-14

## 2020-03-27 ENCOUNTER — OFFICE VISIT (OUTPATIENT)
Dept: ORTHOPEDIC SURGERY | Age: 65
End: 2020-03-27
Payer: MEDICARE

## 2020-03-27 VITALS
HEIGHT: 68 IN | SYSTOLIC BLOOD PRESSURE: 114 MMHG | BODY MASS INDEX: 25.76 KG/M2 | WEIGHT: 169.97 LBS | DIASTOLIC BLOOD PRESSURE: 69 MMHG

## 2020-03-27 PROCEDURE — 99214 OFFICE O/P EST MOD 30 MIN: CPT | Performed by: PHYSICAL MEDICINE & REHABILITATION

## 2020-03-27 PROCEDURE — 20611 DRAIN/INJ JOINT/BURSA W/US: CPT | Performed by: PHYSICAL MEDICINE & REHABILITATION

## 2020-03-27 RX ORDER — TRIAMCINOLONE ACETONIDE 40 MG/ML
40 INJECTION, SUSPENSION INTRA-ARTICULAR; INTRAMUSCULAR ONCE
Status: COMPLETED | OUTPATIENT
Start: 2020-03-27 | End: 2020-03-27

## 2020-03-27 RX ORDER — FUROSEMIDE 80 MG
TABLET ORAL
Qty: 90 TABLET | Refills: 1 | Status: SHIPPED | OUTPATIENT
Start: 2020-03-27 | End: 2020-09-30 | Stop reason: SDUPTHER

## 2020-03-27 RX ORDER — ALPRAZOLAM 0.5 MG/1
0.5 TABLET ORAL NIGHTLY PRN
Qty: 30 TABLET | Refills: 0 | Status: SHIPPED | OUTPATIENT
Start: 2020-03-27 | End: 2020-04-26

## 2020-03-27 RX ADMIN — TRIAMCINOLONE ACETONIDE 40 MG: 40 INJECTION, SUSPENSION INTRA-ARTICULAR; INTRAMUSCULAR at 08:45

## 2020-03-27 NOTE — PROGRESS NOTES
Follow up: Hua Garza  1955  [id number]         Chief Complaint   Patient presents with    Lower Back Pain     F/u LSP         HISTORY OF PRESENT ILLNESS:  Ms. Melonie Coker is a 71 y.o. female returns for a follow up visit for multiple medical problems. Her current presenting problems are   1. Greater trochanteric bursitis of right hip    2. Lumbar radiculopathy    3. HNP (herniated nucleus pulposus), lumbar    4. Paresthesia of right lower extremity    . As per information/history obtained from the PADT(patient assessment and documentation tool) - She complains of pain in the lower back with radiation to the hips Right and lower leg Right She rates the pain 6/10 and describes it as sharp, aching, numbness. Pain is made worse by: walking, bearing weight on right leg. She denies side effects from the current pain regimen. Patient reports that since the last follow up visit the physical functioning is worse, family/social relationships are worse, mood is worse and sleep patterns are worse, and that the overall functioning is worse. Patient denies neurological bowel or bladder. Ms. Melonie Coker presents today for follow up of her ongoing low back and right hip pain. She also notes continued right lower leg numbness. She was last seen 1 month ago, following a lumbar epidural steroid injection on 2/12/20. She notes she was doing well until approximately 1 week ago when her pain began to return. She states her pain level is not as severe as it was prior to her injection, but she is able to tell her pain is returning. She continues to mostly complain of intermittent pain that at times will worse with bearing weight on her right leg. She states her leg pain, other than the numbness, is mostly isolated to her right hip. She denies any new injuries or triggers to her back or hip since her last visit. She does continue to do home exercises regularly.           Associated signs and symptoms:   Neurogenic bowel or bladder symptoms:  no   Perceived weakness:  no   Difficulty walking:  yes              Past Medical History:   Past Medical History:   Diagnosis Date    Anxiety     Fibromyalgia     Gout     Hyperlipidemia     Hypertension     Osteoarthritis       Past Surgical History:     Past Surgical History:   Procedure Laterality Date    APPENDECTOMY  02/13/2019    HYSTERECTOMY      INNER EAR SURGERY      many    KNEE ARTHROSCOPY  2009    RT    PAIN MANAGEMENT PROCEDURE Right 2/12/2020    RIGHT L4 AND L5 TRANSFORAMINAL EPIDURAL STEROID INJECTION WITH FLUOROSCOPY (60345, [id number]) performed by Jaime Edouard MD at [facility]     Current Medications:     Current Outpatient Medications:     pravastatin (PRAVACHOL) 20 MG tablet, TAKE 1 TABLET BY MOUTH EVERY EVENING, Disp: 90 tablet, Rfl: 0    VITAMIN D PO, Take by mouth, Disp: , Rfl:     methocarbamol (ROBAXIN) 500 MG tablet, Take 500 mg by mouth 3 times daily, Disp: , Rfl:     gabapentin (NEURONTIN) 300 MG capsule, Take 300 mg by mouth 3 times daily. , Disp: , Rfl:     furosemide (LASIX) 80 MG tablet, TAKE ONE TABLET BY MOUTH EVERY DAY, Disp: 90 tablet, Rfl: 1    metoprolol succinate (TOPROL XL) 25 MG extended release tablet, TAKE ONE TABLET BY MOUTH 2 TIMES A DAY, Disp: 180 tablet, Rfl: 3    ramipril (ALTACE) 10 MG capsule, TAKE ONE CAPSULE BY MOUTH EVERY DAY, Disp: 90 capsule, Rfl: 3    acetaminophen (TYLENOL) 500 MG tablet, Take 1,000 mg by mouth 3 times daily, Disp: , Rfl:     Multiple Vitamins-Minerals (THERAPEUTIC MULTIVITAMIN-MINERALS) tablet, Take 1 tablet by mouth daily Centrum, Disp: , Rfl:   Allergies:  Darvocet [propoxyphene n-acetaminophen]; Lipitor [atorvastatin]; and Diclofenac  Social History:    reports that she has been smoking cigarettes. She has been smoking about 1.00 pack per day. She has never used smokeless tobacco. She reports that she does not drink alcohol or use drugs.   Family History:   Family History   Problem Relation Age of Onset    Diabetes Mother        REVIEW OF SYSTEMS:   CONSTITUTIONAL: Denies unexplained weight loss, fevers, chills or fatigue  NEUROLOGICAL: Denies unsteady gait or progressive weakness  MUSCULOSKELETAL: Denies joint swelling or redness  GI: Denies nausea, vomiting, diarrhea   : Denies bowel or bladder issues       PHYSICAL EXAM:    Vitals: Blood pressure 114/69, height 5' 7.99\" (1.727 m), weight 169 lb 15.6 oz (77.1 kg). GENERAL EXAM:  · General Apparence: Patient is adequately groomed with no evidence of malnutrition. · Psychiatric: Orientation: The patient is oriented to time, place and person. The patient's mood and affect are appropriate   · Vascular: Examination reveals no swelling and palpation reveals no tenderness in upper or lower extremities. Good capillary refill. · The lymphatic examination of the neck, axillae and groin reveals all areas to be without enlargement or induration  · Sensation is intact without deficit in the upper and lower extremities to light touch and pinprick  · Coordination of the upper and lower extremities are normal.  · RIGHT UPPER EXTREMITY:  Inspection/examination of the right upper extremity does not show any tenderness, deformity or injury. Range of motion is unremarkable and pain-free. There is no gross instability. There are no rashes, ulcerations or lesions. Strength and tone are normal. No atrophy or abnormal movements are noted. · LEFT UPPER EXTREMITY: Inspection/examination of the left upper extremity does not show any tenderness, deformity or injury. Range of motion is unremarkable and pain-free. There is no gross instability. There are no rashes, ulcerations or lesions. Strength and tone are normal. No atrophy or abnormal movements are noted. LUMBAR/SACRAL EXAMINATION:  · Inspection: Local inspection shows no step-off or bruising. Lumbar alignment is normal. No instability is noted. · Palpation:   No evidence of tenderness at the midline.   Lumbar paraspinal tenderness: Mild L4/5 and L5/S1 tenderness  Bursal tenderness Right greater trochanteric tenderness  There is no paraspinal spasm. · Range of Motion: limited by 50% in all planes due to pain  · Strength:   Strength testing is 5/5 in all muscle groups tested. · Special Tests:   Straight leg raise and crossed SLR negative. Gerardo's testing is negative bilaterally. FADIR's testing is negative bilaterally. Bowstring test negative. Slump test negative. · Skin: There are no rashes, ulcerations or lesions. · Reflexes: Reflexes are symmetrically 2+ at the patellar and ankle tendons. Clonus absent bilaterally at the feet. · Gait & station: normal, patient ambulates without assistance and no ataxia  · Additional Examinations:  · RIGHT LOWER EXTREMITY: Inspection/examination of the right lower extremity does not show any tenderness, deformity or injury. Range of motion is normal and pain-free. There is no gross instability. There are no rashes, ulcerations or lesions. Strength and tone are normal. No atrophy or abnormal movements are noted. · LEFT LOWER EXTREMITY:  Inspection/examination of the left lower extremity does not show any tenderness, deformity or injury. Range of motion is normal and pain-free. There is no gross instability. There are no rashes, ulcerations or lesions. Strength and tone are normal. No atrophy or abnormal movements are noted. Diagnostic Testing:    No new diagnostics  Results for orders placed or performed in visit on 05/21/19   Hepatitis C Antibody   Result Value Ref Range    Hep C Ab Interp Non-reactive Non-reactive   Vitamin D 25 Hydroxy   Result Value Ref Range    Vit D, 25-Hydroxy 29.8 (L) >=30 ng/mL   TSH without Reflex   Result Value Ref Range    TSH 1.19 0.27 - 4.20 uIU/mL   CK   Result Value Ref Range    Total CK 71 26 - 192 U/L     Impression:       1. Greater trochanteric bursitis of right hip    2. Lumbar radiculopathy    3.  HNP (herniated nucleus pulposus), Jony.  20 9:11 AM Katie Jones. The physical examination was performed between the patient and Dr. Alexander Borges. All counseling during the appointment was performed between the patient and the provider. I, Dr. Alexander Borges, personally performed the services described in this documentation as scribed by Katie Kramer ATC in my presence and it is both accurate and complete. Lulú Kaplan. Arnaud Sanders MD, SHEREE, [facility]  Board Certified in [address] [place] [address] Certified and Fellowship Trained in [facility] ([facility])             This dictation was performed with a verbal recognition program [facility]) and it was checked for errors. It is possible that there are still dictated errors within this office note. If so, please bring any errors to my attention for an addendum. All efforts were made to ensure that this office note is accurate.

## 2020-04-23 ENCOUNTER — TELEPHONE (OUTPATIENT)
Dept: FAMILY MEDICINE CLINIC | Age: 65
End: 2020-04-23

## 2020-04-27 ENCOUNTER — VIRTUAL VISIT (OUTPATIENT)
Dept: FAMILY MEDICINE CLINIC | Age: 65
End: 2020-04-27
Payer: MEDICARE

## 2020-04-27 VITALS
HEIGHT: 68 IN | TEMPERATURE: 97.5 F | WEIGHT: 163 LBS | DIASTOLIC BLOOD PRESSURE: 83 MMHG | HEART RATE: 74 BPM | SYSTOLIC BLOOD PRESSURE: 142 MMHG | BODY MASS INDEX: 24.71 KG/M2

## 2020-04-27 PROCEDURE — G2012 BRIEF CHECK IN BY MD/QHP: HCPCS | Performed by: NURSE PRACTITIONER

## 2020-04-27 RX ORDER — ALPRAZOLAM 0.5 MG/1
0.5 TABLET ORAL NIGHTLY PRN
COMMUNITY
End: 2020-04-27 | Stop reason: SDUPTHER

## 2020-04-27 RX ORDER — ALPRAZOLAM 0.5 MG/1
0.5 TABLET ORAL NIGHTLY PRN
Qty: 30 TABLET | Refills: 0 | Status: SHIPPED | OUTPATIENT
Start: 2020-04-27 | End: 2020-06-15 | Stop reason: SDUPTHER

## 2020-04-27 ASSESSMENT — ENCOUNTER SYMPTOMS
SHORTNESS OF BREATH: 0
COUGH: 0
DIARRHEA: 0
VOMITING: 0
NAUSEA: 0

## 2020-04-27 NOTE — PROGRESS NOTES
acetaminophen (TYLENOL) 500 MG tablet Take 1,000 mg by mouth 3 times daily      Multiple Vitamins-Minerals (THERAPEUTIC MULTIVITAMIN-MINERALS) tablet Take 1 tablet by mouth daily Centrum       No current facility-administered medications on file prior to visit.          Allergies   Allergen Reactions    Darvocet [Propoxyphene N-Acetaminophen]     Lipitor [Atorvastatin]      Leg cramping    Diclofenac Rash       Past Medical History:   Diagnosis Date    Anxiety     Fibromyalgia     Gout     Hyperlipidemia     Hypertension     Osteoarthritis        Past Surgical History:   Procedure Laterality Date    APPENDECTOMY  02/13/2019    HYSTERECTOMY      INNER EAR SURGERY      many    KNEE ARTHROSCOPY  2009    RT    PAIN MANAGEMENT PROCEDURE Right 2/12/2020    RIGHT L4 AND L5 TRANSFORAMINAL EPIDURAL STEROID INJECTION WITH FLUOROSCOPY ([id number], [id number]) performed by Mayela Rayo MD at [address] [place] [address] Marital status:      Spouse name: Not on file    Number of children: Not on file    Years of education: Not on file    Highest education level: Not on file   Occupational History    Occupation: LPN     Comment: Full Time   Social Needs    Financial resource strain: Not on file    Food insecurity     Worry: Not on file     Inability: Not on file   Prezi needs     Medical: Not on file     Non-medical: Not on file   Tobacco Use    Smoking status: Current Every Day Smoker     Packs/day: 1.00     Types: Cigarettes    Smokeless tobacco: Never Used   Substance and Sexual Activity    Alcohol use: No     Frequency: Never    Drug use: No    Sexual activity: Not Currently   Lifestyle    Physical activity     Days per week: Not on file     Minutes per session: Not on file    Stress: Not on file   Relationships    Social connections     Talks on phone: Not on file     Gets together: Not on file     Attends Religion service: Not on file     Active member of club or organization: Not on file     Attends meetings of clubs or organizations: Not on file     Relationship status: Not on file    Intimate partner violence     Fear of current or ex partner: Not on file     Emotionally abused: Not on file     Physically abused: Not on file     Forced sexual activity: Not on file   Other Topics Concern    Not on file   Social History Narrative    Not on file        Family History   Problem Relation Age of Onset    Diabetes Mother      Vitals provided by patient - taken at home. BP (!) 142/83 (Site: Left Upper Arm)   Pulse 74   Temp 97.5 °F (36.4 °C) (Temporal)   Ht 5' 8\" (1.727 m)   Wt 163 lb (73.9 kg)   BMI 24.78 kg/m²        Estimated body mass index is 24.78 kg/m² as calculated from the following:    Height as of this encounter: 5' 8\" (1.727 m). Weight as of this encounter: 163 lb (73.9 kg). - PHYSICAL EXAM LIMITED DUE TO VIRTUAL VISIT, HOWEVER VISUAL INSPECTION DID REVEAL:  Physical Exam   Telephone only - unable to get video connection. ASSESSMENT/PLAN:  1. Anxiety  Stable. Continue on current medication regimen. Will refill when needed. Follow up with office in 3 months for continued controlled substance monitoring.  - ALPRAZolam (XANAX) 0.5 MG tablet; Take 1 tablet by mouth nightly as needed for Sleep or Anxiety for up to 30 days. Dispense: 30 tablet; Refill: 0    2. Weight loss  Encouraged continued healthy diet and exercise - may consider working on incorporating weight bearing exercise due to concerns with Osteopenia. Current Outpatient Medications   Medication Sig Dispense Refill    ALPRAZolam (XANAX) 0.5 MG tablet Take 1 tablet by mouth nightly as needed for Sleep or Anxiety for up to 30 days.  30 tablet 0    furosemide (LASIX) 80 MG tablet TAKE ONE TABLET BY MOUTH EVERY DAY 90 tablet 1    pravastatin (PRAVACHOL) 20 MG tablet TAKE 1 TABLET BY MOUTH EVERY EVENING 90 tablet 0    VITAMIN D PO Take by mouth      gabapentin (NEURONTIN) 300 MG capsule Take 300 mg by mouth 3 times daily.  metoprolol succinate (TOPROL XL) 25 MG extended release tablet TAKE ONE TABLET BY MOUTH 2 TIMES A  tablet 3    ramipril (ALTACE) 10 MG capsule TAKE ONE CAPSULE BY MOUTH EVERY DAY 90 capsule 3    acetaminophen (TYLENOL) 500 MG tablet Take 1,000 mg by mouth 3 times daily      Multiple Vitamins-Minerals (THERAPEUTIC MULTIVITAMIN-MINERALS) tablet Take 1 tablet by mouth daily Centrum       No current facility-administered medications for this visit. Health Maintenance Due   Topic Date Due    HIV screen  08/05/1969    Cervical cancer screen  08/05/1975    Breast cancer screen  12/04/2017    Annual Wellness Visit (AWV)  09/10/2019    Lipid screen  12/10/2019    Potassium monitoring  12/10/2019    Creatinine monitoring  12/10/2019     Aide Maravilla was counseled regarding symptoms of current diagnosis, course and complications of disease if inadequately treated. Discussed side effects of medications, diagnosis, treatment options, and prognosis along with risks, benefits, complications, and alternatives of treatment including labs, imaging and other studies/treatment targets and goals. She verbalized understanding of instructions and counseling. Return in about 3 months (around 7/27/2020) for Anxiety. Pursuant to the emergency declaration under the [facility], 1135 waiver authority and the [employer] and [employer] Act, this Virtual  Visit was conducted, with patient's consent, to reduce the patient's risk of exposure to COVID-19 and provide continuity of care for an established patient. Services were provided through a video synchronous discussion virtually to substitute for in-person clinic visit. Patient was instructed that the AVS is available on My Chart or was emailed to the patient if not on My Chart.  Lab orders were emailed to patient if they do not use a [facility] lab. Any work notes were sent to patient through My Chart or e-mail. An  electronic signature was used to authenticate this note.     --PLACIDO Hammer - CNP on 4/27/2020 at 8:33 AM

## 2020-06-12 NOTE — TELEPHONE ENCOUNTER
ALPRAZolam (XANAX) 0.5 MG tablet 30 tablet 0 4/27/2020 5/27/2020     Pharmacy:  [facility] [address], [address] [place] [address] [phone] - F [phone]

## 2020-06-15 RX ORDER — ALPRAZOLAM 0.5 MG/1
0.5 TABLET ORAL NIGHTLY PRN
Qty: 30 TABLET | Refills: 0 | Status: SHIPPED | OUTPATIENT
Start: 2020-06-15 | End: 2020-07-27 | Stop reason: SDUPTHER

## 2020-06-25 RX ORDER — METOPROLOL SUCCINATE 25 MG/1
TABLET, EXTENDED RELEASE ORAL
Qty: 180 TABLET | Refills: 0 | Status: SHIPPED | OUTPATIENT
Start: 2020-06-25 | End: 2020-09-30 | Stop reason: SDUPTHER

## 2020-06-25 RX ORDER — RAMIPRIL 10 MG/1
CAPSULE ORAL
Qty: 90 CAPSULE | Refills: 0 | Status: SHIPPED | OUTPATIENT
Start: 2020-06-25 | End: 2020-09-30 | Stop reason: SDUPTHER

## 2020-06-25 NOTE — TELEPHONE ENCOUNTER
Medication:   Requested Prescriptions     Pending Prescriptions Disp Refills    ramipril (ALTACE) 10 MG capsule 90 capsule 3     Sig: TAKE ONE CAPSULE BY MOUTH EVERY DAY    metoprolol succinate (TOPROL XL) 25 MG extended release tablet 180 tablet 3     Sig: TAKE ONE TABLET BY MOUTH 2 TIMES A DAY      Last Filled:  4/13/19    Patient Phone Number: [phone] (home) [phone] (work)    Last appt: 12/18/2019   Next appt: 7/27/2020    Last OARRS:   RX Monitoring 4/24/2019   Attestation The Prescription Monitoring Report for this patient was reviewed today. Periodic Controlled Substance Monitoring -   Chronic Pain > 50 MEDD Obtained or confirmened \"Consent of Opioid Use\" on file.      PDMP Monitoring:    Last PDMP [place] as Reviewed [facility]):  Review User Review Instant Review Result          Preferred Pharmacy:   [facility] [address] [place] [address] [phone] Donna Martinez [phone]  [address] [address]  Phone: [phone] Fax: [phone]

## 2020-06-29 RX ORDER — FUROSEMIDE 80 MG
TABLET ORAL
Qty: 90 TABLET | Refills: 1 | OUTPATIENT
Start: 2020-06-29

## 2020-06-29 RX ORDER — GABAPENTIN 300 MG/1
300 CAPSULE ORAL 3 TIMES DAILY
Qty: 270 CAPSULE | Refills: 1 | OUTPATIENT
Start: 2020-06-29 | End: 2021-06-29

## 2020-06-29 NOTE — TELEPHONE ENCOUNTER
Disp Refills Start End    furosemide (LASIX) 80 MG tablet 90 tablet 1 3/27/2020     Sig: TAKE ONE TABLET BY MOUTH EVERY DAY       Disp Refills Start End    gabapentin (NEURONTIN) 300 MG capsule (Discontinued) 270 capsule 3 6/25/2019 1/3/2020    Sig - Route:  Take 1 capsule by mouth 3 times daily for 90 days. - Oral      Pharmacy:  [facility] [address] [place], [place] [address]a [phone] Avani Watson [phone]      Please advise

## 2020-07-14 RX ORDER — PRAVASTATIN SODIUM 20 MG
20 TABLET ORAL EVERY EVENING
Qty: 90 TABLET | Refills: 0 | Status: SHIPPED | OUTPATIENT
Start: 2020-07-14 | End: 2020-09-30 | Stop reason: SDUPTHER

## 2020-07-21 ENCOUNTER — VIRTUAL VISIT (OUTPATIENT)
Dept: RHEUMATOLOGY | Age: 65
End: 2020-07-21
Payer: MEDICARE

## 2020-07-21 PROCEDURE — 99213 OFFICE O/P EST LOW 20 MIN: CPT | Performed by: INTERNAL MEDICINE

## 2020-07-21 NOTE — PROGRESS NOTES
Jet Gilmore is a 71 y.o. female being evaluated by a Virtual Visit (video visit) encounter to address concerns as mentioned above. A caregiver was present when appropriate. Due to this being a TeleHealth encounter (During KNRSW-72 public health emergency), evaluation of the following organ systems was limited: Vitals/Constitutional/EENT/Resp/CV/GI//MS/Neuro/Skin/Heme-Lymph-Imm. Pursuant to the emergency declaration under the [address] [place] [address] and the [employer] and [employer] Act, this Virtual Visit was conducted with patient's (and/or legal guardian's) consent, to reduce the patient's risk of exposure to COVID-19 and provide necessary medical care. The patient (and/or legal guardian) has also been advised to contact this office for worsening conditions or problems, and seek emergency medical treatment and/or call 911 if deemed necessary. Patient identification was verified at the start of the visit: Yes    Total time spent for this encounter: Not billed by time    Services were provided through a video synchronous discussion virtually to substitute for in-person clinic visit. Patient and provider were located at their individual homes. --Gideon Lara RN on 7/21/2020 at 8:19 AM    An electronic signature was used to authenticate this note.

## 2020-07-21 NOTE — PROGRESS NOTES
Subjective:      Patient ID: Urmila Damon is a 71 y.o. female. HPI  The patient returns for follow-up of fibromyalgia. She continues on Neurontin 300 mg 3 times daily. She continues to work. Review of Systems  Myalgias improved still wakes up at night   Objective:   Physical Exam  There were no vitals taken for this visit. Alert female no acute distress respiratory rate appears to be within normal limits  Assessment:      Fibromyalgia      Plan:      The patient will continue her current dose of Neurontin. I will see her back in 6 months time.         Eli Eduardo MD

## 2020-07-27 ENCOUNTER — OFFICE VISIT (OUTPATIENT)
Dept: FAMILY MEDICINE CLINIC | Age: 65
End: 2020-07-27
Payer: MEDICARE

## 2020-07-27 VITALS
TEMPERATURE: 96.9 F | RESPIRATION RATE: 16 BRPM | SYSTOLIC BLOOD PRESSURE: 130 MMHG | BODY MASS INDEX: 24.6 KG/M2 | HEART RATE: 72 BPM | DIASTOLIC BLOOD PRESSURE: 82 MMHG | WEIGHT: 161.8 LBS | OXYGEN SATURATION: 96 %

## 2020-07-27 PROCEDURE — 99213 OFFICE O/P EST LOW 20 MIN: CPT | Performed by: NURSE PRACTITIONER

## 2020-07-27 RX ORDER — ALPRAZOLAM 0.5 MG/1
0.5 TABLET ORAL NIGHTLY PRN
Qty: 30 TABLET | Refills: 0 | Status: SHIPPED | OUTPATIENT
Start: 2020-07-27 | End: 2020-08-24 | Stop reason: SDUPTHER

## 2020-07-27 ASSESSMENT — ENCOUNTER SYMPTOMS
SHORTNESS OF BREATH: 0
NAUSEA: 0
CONSTIPATION: 0
DIARRHEA: 0

## 2020-07-27 NOTE — PROGRESS NOTES
Rosales Clarke  : 1955  Encounter date: 2020    This dominick 71 y.o. female who presents with  Chief Complaint   Patient presents with    Anxiety       History of present illness:    HPI Pt is 71year old female for medication recheck, taking 0.5 mg xanax for insomnia. Pt denies current concerns. Pt has tried other OTC sleep aids with little relief. Pt reports continued to work, takes care of grandchild and eating healthier, has lost recent weight. Current Outpatient Medications on File Prior to Visit   Medication Sig Dispense Refill    pravastatin (PRAVACHOL) 20 MG tablet TAKE 1 TABLET BY MOUTH EVERY EVENING 90 tablet 0    ramipril (ALTACE) 10 MG capsule TAKE ONE CAPSULE BY MOUTH EVERY DAY 90 capsule 0    metoprolol succinate (TOPROL XL) 25 MG extended release tablet TAKE ONE TABLET BY MOUTH 2 TIMES A  tablet 0    furosemide (LASIX) 80 MG tablet TAKE ONE TABLET BY MOUTH EVERY DAY 90 tablet 1    VITAMIN D PO Take by mouth      gabapentin (NEURONTIN) 300 MG capsule Take 300 mg by mouth 3 times daily.  acetaminophen (TYLENOL) 500 MG tablet Take 1,000 mg by mouth 3 times daily      Multiple Vitamins-Minerals (THERAPEUTIC MULTIVITAMIN-MINERALS) tablet Take 1 tablet by mouth daily Centrum       No current facility-administered medications on file prior to visit.        Allergies   Allergen Reactions    Darvocet [Propoxyphene N-Acetaminophen]     Lipitor [Atorvastatin]      Leg cramping    Diclofenac Rash     Past Medical History:   Diagnosis Date    Anxiety     Fibromyalgia     Gout     Hyperlipidemia     Hypertension     Osteoarthritis       Past Surgical History:   Procedure Laterality Date    APPENDECTOMY  2019    HYSTERECTOMY      INNER EAR SURGERY      many    KNEE ARTHROSCOPY      RT    PAIN MANAGEMENT PROCEDURE Right 2020    RIGHT L4 AND L5 TRANSFORAMINAL EPIDURAL STEROID INJECTION WITH FLUOROSCOPY (53867, [id number]) performed by Alejandra Espana MD at [facility]      Family History   Problem Relation Age of Onset    Diabetes Mother       Social History     Tobacco Use    Smoking status: Current Every Day Smoker     Packs/day: 1.00     Types: Cigarettes    Smokeless tobacco: Never Used   Substance Use Topics    Alcohol use: No     Frequency: Never        Review of Systems   Constitutional: Negative for activity change, appetite change, fatigue and unexpected weight change. Respiratory: Negative for shortness of breath. Cardiovascular: Negative for chest pain, palpitations and leg swelling. Gastrointestinal: Negative for constipation, diarrhea and nausea. Allergic/Immunologic: Negative for immunocompromised state. Hematological: Bruises/bleeds easily (bilateral arms). Psychiatric/Behavioral: Positive for sleep disturbance. Negative for dysphoric mood. The patient is not nervous/anxious. Objective:    /82   Pulse 72   Temp 96.9 °F (36.1 °C)   Resp 16   Wt 161 lb 12.8 oz (73.4 kg)   SpO2 96%   BMI 24.60 kg/m²   Weight: 161 lb 12.8 oz (73.4 kg)     BP Readings from Last 3 Encounters:   07/27/20 130/82   04/27/20 (!) 142/83   03/27/20 114/69     Wt Readings from Last 3 Encounters:   07/27/20 161 lb 12.8 oz (73.4 kg)   04/27/20 163 lb (73.9 kg)   03/27/20 169 lb 15.6 oz (77.1 kg)     BMI Readings from Last 3 Encounters:   07/27/20 24.60 kg/m²   04/27/20 24.78 kg/m²   03/27/20 25.85 kg/m²       Physical Exam  Vitals signs reviewed. Constitutional:       Appearance: Normal appearance. She is well-developed. Cardiovascular:      Rate and Rhythm: Normal rate and regular rhythm. Heart sounds: Normal heart sounds. No murmur. Pulmonary:      Effort: Pulmonary effort is normal.      Breath sounds: Normal breath sounds. Skin:     General: Skin is warm and dry. Capillary Refill: Capillary refill takes less than 2 seconds. Neurological:      Mental Status: She is alert and oriented to person, place, and time.    Psychiatric:

## 2020-08-14 ENCOUNTER — OFFICE VISIT (OUTPATIENT)
Dept: ORTHOPEDIC SURGERY | Age: 65
End: 2020-08-14
Payer: MEDICARE

## 2020-08-14 VITALS — WEIGHT: 161 LBS | TEMPERATURE: 97.5 F | BODY MASS INDEX: 24.4 KG/M2 | RESPIRATION RATE: 12 BRPM | HEIGHT: 68 IN

## 2020-08-14 PROCEDURE — 99214 OFFICE O/P EST MOD 30 MIN: CPT | Performed by: PHYSICAL MEDICINE & REHABILITATION

## 2020-08-14 NOTE — PROGRESS NOTES
Follow up: Hua Garza  1955  [id number]         Chief Complaint   Patient presents with    Lower Back Pain     F/u LSP; LOV 3/27/20         HISTORY OF PRESENT ILLNESS:  Ms. Paul Reinoso is a 76 y.o. female returns for a follow up visit for multiple medical problems. Her current presenting problems are   1. Sacroiliac joint dysfunction of right side    2. Greater trochanteric bursitis of right hip    . As per information/history obtained from the PADT(patient assessment and documentation tool) - She complains of pain in the lower back with radiation to the buttocks, upper leg Right and groin Right She rates the pain 4/10 and describes it as aching. Pain is made worse by: movement. She denies side effects from the current pain regimen. Patient reports that since the last follow up visit the physical functioning is worse, family/social relationships are worse, mood is worse and sleep patterns are worse, and that the overall functioning is worse. Patient denies neurological bowel or bladder. Ms. Paul Reinoso presents today for follow up of her ongoing low back and right upper leg pain. She also notes having buttocks and groin pain on the right side. She was last seen in March 2020 and was given a right greater trochanteric bursa injection. She states she was doing well until 2-3 weeks ago when her symptoms began to flare up. She denies any specific injuries or triggers to cause her pain to increase. She denies any numbness or tingling in her legs at this time. She also denies having any weakness in her legs at this time. She has not had any treatment since her last visit outside of Tylenol. While her pain and symptoms are increased by movement, she states her pain has not been severe enough to stop working or performing her basic daily activities. She can currently stand for 5 minutes and walk for roughly the same amount of time comfortably.   She is not present today using any ambulatory devices or braces. Associated signs and symptoms:   Neurogenic bowel or bladder symptoms:  no   Perceived weakness:  no   Difficulty walking:  yes            Past medical, surgical, social and family history reviewed with the patient. No pertinent relevant history  Past Medical History:   Past Medical History:   Diagnosis Date    Anxiety     Fibromyalgia     Gout     Hyperlipidemia     Hypertension     Osteoarthritis       Past Surgical History:     Past Surgical History:   Procedure Laterality Date    APPENDECTOMY  02/13/2019    HYSTERECTOMY      INNER EAR SURGERY      many    KNEE ARTHROSCOPY  2009    RT    PAIN MANAGEMENT PROCEDURE Right 2/12/2020    RIGHT L4 AND L5 TRANSFORAMINAL EPIDURAL STEROID INJECTION WITH FLUOROSCOPY (76721, 93921) performed by Daysi Guy MD at [facility]     Current Medications:     Current Outpatient Medications:     ALPRAZolam (XANAX) 0.5 MG tablet, Take 1 tablet by mouth nightly as needed for Sleep or Anxiety for up to 30 days. , Disp: 30 tablet, Rfl: 0    pravastatin (PRAVACHOL) 20 MG tablet, TAKE 1 TABLET BY MOUTH EVERY EVENING, Disp: 90 tablet, Rfl: 0    ramipril (ALTACE) 10 MG capsule, TAKE ONE CAPSULE BY MOUTH EVERY DAY, Disp: 90 capsule, Rfl: 0    metoprolol succinate (TOPROL XL) 25 MG extended release tablet, TAKE ONE TABLET BY MOUTH 2 TIMES A DAY, Disp: 180 tablet, Rfl: 0    furosemide (LASIX) 80 MG tablet, TAKE ONE TABLET BY MOUTH EVERY DAY, Disp: 90 tablet, Rfl: 1    VITAMIN D PO, Take by mouth, Disp: , Rfl:     gabapentin (NEURONTIN) 300 MG capsule, Take 300 mg by mouth 3 times daily. , Disp: , Rfl:     acetaminophen (TYLENOL) 500 MG tablet, Take 1,000 mg by mouth 3 times daily, Disp: , Rfl:     Multiple Vitamins-Minerals (THERAPEUTIC MULTIVITAMIN-MINERALS) tablet, Take 1 tablet by mouth daily Centrum, Disp: , Rfl:   Allergies:  Darvocet [propoxyphene n-acetaminophen];  Lipitor [atorvastatin]; and Diclofenac  Social History:    reports that she normal. No atrophy or abnormal movements are noted. LUMBAR/SACRAL EXAMINATION:  · Inspection: Local inspection shows no step-off or bruising. Lumbar alignment is normal. No instability is noted. · Palpation:   No evidence of tenderness at the midline. Lumbar paraspinal tenderness: Mild L4/5 and L5/S1 tenderness  Bursal tenderness Right greater trochanteric tenderness  There is no paraspinal spasm. · Range of Motion: limited by 25% in all planes due to pain  · Strength:   Strength testing is 5/5 in all muscle groups tested. · Special Tests:   Straight leg raise and crossed SLR negative. Gerardo's testing is + on right  · Skin: There are no rashes, ulcerations or lesions. · Reflexes: Reflexes are symmetrically 2+ at the patellar and ankle tendons. Clonus absent bilaterally at the feet. · Gait & station: antalgic on the right and no ataxia  · Additional Examinations:  · RIGHT LOWER EXTREMITY: Inspection/examination of the right lower extremity does not show any tenderness, deformity or injury. Range of motion is normal and pain-free. There is no gross instability. There are no rashes, ulcerations or lesions. Strength and tone are normal. No atrophy or abnormal movements are noted. · LEFT LOWER EXTREMITY:  Inspection/examination of the left lower extremity does not show any tenderness, deformity or injury. Range of motion is normal and pain-free. There is no gross instability. There are no rashes, ulcerations or lesions. Strength and tone are normal. No atrophy or abnormal movements are noted.       Diagnostic Testing:    No new diagnostics  Results for orders placed or performed in visit on 05/21/19   Hepatitis C Antibody   Result Value Ref Range    Hep C Ab Interp Non-reactive Non-reactive   Vitamin D 25 Hydroxy   Result Value Ref Range    Vit D, 25-Hydroxy 29.8 (L) >=30 ng/mL   TSH without Reflex   Result Value Ref Range    TSH 1.19 0.27 - 4.20 uIU/mL   CK   Result Value Ref Range    Total CK 71 26 - 192 U/L     Impression:       1. Sacroiliac joint dysfunction of right side    2. Greater trochanteric bursitis of right hip        Plan:  Clinical Course: Above diagnoses are worsening    I discussed the diagnosis and the treatment options with Cristina Aguilar today. In Summary:  The various treatment options were outlined and discussed with Randa Zepeda including:  Conservative care options: physical therapy, ice, medications, bracing, and activity modification. The indications for therapeutic injections. The indications for additional imaging/laboratory studies. The indications for (possible future) interventions. After considering the various options discussed, Cristina Aguilar elected to pursue a course of treatment that includes the followin. Medications:  No further recommendations for new medications. 2. PT:  Encouraged to continue with Home exercise program.    3. Further studies: COVID-19 PCR testing already undertaken    4. Interventional:  We discussed pursuing a right Sacroiliac joint injection as SI joint pathology is suspected as the etiology of the chronic low back/hip pain. There has been failure of non-invasive and conservative treatment including physical therapy/home exercise program, rest, NSAIDs or acetaminophen. Risks include but are not limited to bleeding, infection, nerve injury, increase in pain and lack of pain relief. The patient verbalized understanding and would like to proceed. As such, I have confirmed the patient has met the general requirements including failure of conservative management including prescription strength analgesics, adjunctive medications were not an option due to comorbidities or advanced age, therapeutic exercise program, and no underlying addiction or behavioral disorders were identified to the ability of the provider. Symptoms are impacting their ADLs or iADLs such as walking and significant pain was noted in the HPI.  Imaging studies as noted in the diagnostic imaging section of the consultation were reviewed and correlated with clinical findings. Fluoroscopy is utilized for interventional procedures. For an initial Therapeutic SI joint   Persistent unilateral non-radicular pain below the lumbar spine (L5) level is noted over the region of sacroiliac and has been present for 3 months without adequate improvement after 6 weeks of conservative management. Exam findings show localized tenderness over sacral sulcus without tenderness noted elsewhere in the same severity and provocative test is noted in the exam section of the consultation. There is no evidence of subacute radicular pain, radiculopathy or neurogenic claudication. A therapeutic Sacroiliac joint injection will be performed with the use of corticosteroid. 5. Follow up:  1-2 weeks      Paul Douglas was instructed to call the office if her symptoms worsen or if new symptoms appear prior to the next scheduled visit. She is specifically instructed to contact the office between now & her scheduled appointment if she has concerns related to her condition or if she needs assistance in scheduling the above tests. She is welcome to call for an appointment sooner if she has any additional concerns or questions. Katie DIAZ am scribing for Dr. Suellen Borges. 08/14/20 9:13 AM Katie Drummond. The physical examination was performed between the patient and Dr. Suellen Borges. All counseling during the appointment was performed between the patient and the provider. Dr. Suellen DIAZ, personally performed the services described in this documentation as scribed by Katie Kramer ATC in my presence and it is both accurate and complete. Maria Elena Conti.  Cleve Dorman MD, SHEREE, [facility]  Board Certified in [place]  Board Certified and Fellowship Trained in [facility] ([facility])             This dictation was performed with a verbal recognition program (DRAGON) and it was checked for errors. It is possible that there are still dictated errors within this office note. If so, please bring any errors to my attention for an addendum. All efforts were made to ensure that this office note is accurate.

## 2020-08-17 ENCOUNTER — TELEPHONE (OUTPATIENT)
Dept: ORTHOPEDIC SURGERY | Age: 65
End: 2020-08-17

## 2020-08-17 NOTE — TELEPHONE ENCOUNTER
Vivian Radha [id number]    Date: 8/31/2020 thru 11/29/2020  Type of SX:  Outpatient Si joint  Location: [facility]  CPT: 87635   DX Code: M53.3, M76.61  SX area: [place] [address] Joint  Insurance: [employer] [address]    CPT: 56051, 66353 [id number], 93863  NPR

## 2020-08-24 RX ORDER — ALPRAZOLAM 0.5 MG/1
0.5 TABLET ORAL NIGHTLY PRN
Qty: 30 TABLET | Refills: 0 | Status: SHIPPED | OUTPATIENT
Start: 2020-08-24 | End: 2020-09-30 | Stop reason: SDUPTHER

## 2020-08-24 NOTE — TELEPHONE ENCOUNTER
Medication:   Requested Prescriptions     Pending Prescriptions Disp Refills    ALPRAZolam (XANAX) 0.5 MG tablet 30 tablet 0     Sig: Take 1 tablet by mouth nightly as needed for Sleep or Anxiety for up to 30 days. Last Filled:  7/27/2020    Patient Phone Number: [phone] (home) [phone] (work)    Last appt: 7/27/2020   Next appt: Visit date not found    Last OARRS:   RX Monitoring 4/24/2019   Attestation The Prescription Monitoring Report for this patient was reviewed today. Periodic Controlled Substance Monitoring -   Chronic Pain > 50 MEDD Obtained or confirmened \"Consent of Opioid Use\" on file.      PDMP Monitoring:    Last PDMP Chucky Sky as Reviewed [facility]):  Review User Review Instant Review Result   Rocky Wren 7/27/2020  9:29 AM Reviewed PDMP [1]     Preferred Pharmacy:   [facility] [address] [facility] [address]  [phone] Clyde Holliday [phone]  [address] [place] [id number]  Phone: [phone] Fax: [phone]

## 2020-08-26 NOTE — PROGRESS NOTES
PATIENT REACHED   YES____NO__X__    PREOP INSTUCTIONS LEFT ON  ZRPTOY__[phone]_____________    Patient instructed to get their COVID-19 test done as directed by their doctor (5-7 days prior to procedure)  or patient states will get on ___8/26_______. Patient was notified that they need to have an appointment,number to call provided. The day the COVID test is done is considered day one. Instructed to self quarantine after test until DOS. There is a one visitor policy at [facility] for all surgeries and endoscopies. Whether the visitor can stay or will be asked to wait in the car will depend on the current policy and if social distancing can be maintained. The policy is subject to change at any time. Please make sure the visitor has a cell phone that is on,charged and able to accept calls, as this may be the way that the staff communicates with them. Pain management is NO VISITOR policyThe patients ride is expected to remain in the car with a cell phone for communication. If the ride is leaving the hospital grounds please make sure they are back in time for pickup. Have the patient inform the staff on arrival what their rides plans are while the patient is in the facility. At the MAIN there is one visitor allowed. Please note that the visitor policy is subject to change. DATE__8/31/20_______ TIME_1050________ARRIVAL__0950______PLACE_masc___________  NOTHING TO EAT OR DRINK  AFTER MIDNIGHT THE EVENING PRIOR OR AS INSTRUCTED BY YOUR DR. Cordero Spar NEED A RESPONSIBLE ADULT AGE 18 OR OLDER TO DRIVE YOU HOME  PLEASE BRING INSURANCE CARD. PICTURE ID AND COMPLETE LIST OF MEDS  WEAR LOOSE COMFORTABLE CLOTHING  FOLLOW ANY INSTRUCTIONS YOUR DRS OFFICE HAS GIVEN YOU,INCLUDING WHAT MEDICATIONS TO TAKE THE AM OF PROCEDURE AND WHEN AND IF YOU NEED TO STOP ANY BLOOD THINNERS.  IF YOU HAVE QUESTIONS REGARDING THIS CALL THE OFFICE  THE GOAL BLOOD SUGAR THE AM OF PROCEDURE  OR LESS ABOVE THAT THE PROCEDURE MAY BE CANCELLED  ANY QUESTIONS CALL YOUR DOCTOR. ALSO,PLEASE READ THE INSTRUCTION PACKET FROM YOUR DR IF YOU RECEIVED ONE.   SPINE INTERVENTION NUMBER [phone]

## 2020-08-31 ENCOUNTER — APPOINTMENT (OUTPATIENT)
Dept: GENERAL RADIOLOGY | Age: 65
End: 2020-08-31
Attending: PHYSICAL MEDICINE & REHABILITATION
Payer: MEDICARE

## 2020-08-31 ENCOUNTER — HOSPITAL ENCOUNTER (OUTPATIENT)
Age: 65
Setting detail: OUTPATIENT SURGERY
Discharge: HOME | End: 2020-08-31
Attending: PHYSICAL MEDICINE & REHABILITATION | Admitting: PHYSICAL MEDICINE & REHABILITATION
Payer: MEDICARE

## 2020-08-31 VITALS
BODY MASS INDEX: 24.88 KG/M2 | SYSTOLIC BLOOD PRESSURE: 128 MMHG | OXYGEN SATURATION: 98 % | DIASTOLIC BLOOD PRESSURE: 75 MMHG | RESPIRATION RATE: 16 BRPM | HEART RATE: 69 BPM | TEMPERATURE: 98.6 F | WEIGHT: 168 LBS | HEIGHT: 69 IN

## 2020-08-31 PROCEDURE — 99152 MOD SED SAME PHYS/QHP 5/>YRS: CPT | Performed by: PHYSICAL MEDICINE & REHABILITATION

## 2020-08-31 PROCEDURE — G0260 INJ FOR SACROILIAC JT ANESTH: HCPCS | Performed by: PHYSICAL MEDICINE & REHABILITATION

## 2020-08-31 PROCEDURE — 6360000002 HC RX W HCPCS: Performed by: PHYSICAL MEDICINE & REHABILITATION

## 2020-08-31 PROCEDURE — G0260 INJ FOR SACROILIAC JT ANESTH: HCPCS

## 2020-08-31 PROCEDURE — 2709999900 HC NON-CHARGEABLE SUPPLY: Performed by: PHYSICAL MEDICINE & REHABILITATION

## 2020-08-31 PROCEDURE — 2500000003 HC RX 250 WO HCPCS: Performed by: PHYSICAL MEDICINE & REHABILITATION

## 2020-08-31 PROCEDURE — 3610000056 HC PAIN LEVEL 4 BASE (NON-OR): Performed by: PHYSICAL MEDICINE & REHABILITATION

## 2020-08-31 RX ORDER — MIDAZOLAM HYDROCHLORIDE 1 MG/ML
INJECTION INTRAMUSCULAR; INTRAVENOUS
Status: COMPLETED | OUTPATIENT
Start: 2020-08-31 | End: 2020-08-31

## 2020-08-31 RX ORDER — METHYLPREDNISOLONE ACETATE 80 MG/ML
INJECTION, SUSPENSION INTRA-ARTICULAR; INTRALESIONAL; INTRAMUSCULAR; SOFT TISSUE
Status: COMPLETED | OUTPATIENT
Start: 2020-08-31 | End: 2020-08-31

## 2020-08-31 RX ORDER — LIDOCAINE HYDROCHLORIDE 10 MG/ML
INJECTION, SOLUTION EPIDURAL; INFILTRATION; INTRACAUDAL; PERINEURAL
Status: COMPLETED | OUTPATIENT
Start: 2020-08-31 | End: 2020-08-31

## 2020-08-31 RX ORDER — FENTANYL CITRATE 50 UG/ML
INJECTION, SOLUTION INTRAMUSCULAR; INTRAVENOUS
Status: COMPLETED | OUTPATIENT
Start: 2020-08-31 | End: 2020-08-31

## 2020-08-31 RX ORDER — BUPIVACAINE HYDROCHLORIDE 5 MG/ML
INJECTION, SOLUTION EPIDURAL; INTRACAUDAL
Status: COMPLETED | OUTPATIENT
Start: 2020-08-31 | End: 2020-08-31

## 2020-08-31 ASSESSMENT — PAIN SCALES - GENERAL
PAINLEVEL_OUTOF10: 0
PAINLEVEL_OUTOF10: 0

## 2020-08-31 ASSESSMENT — PAIN - FUNCTIONAL ASSESSMENT: PAIN_FUNCTIONAL_ASSESSMENT: 0-10

## 2020-08-31 ASSESSMENT — PAIN DESCRIPTION - DESCRIPTORS: DESCRIPTORS: BURNING;PRESSURE

## 2020-08-31 NOTE — H&P
HISTORY AND PHYSICAL/PRE-SEDATION ASSESSMENT    Patient:  Dru Wang   :  1955  Medical Record No.:  [id number]   Date:  2020  Physician:  Veda Molina M.D. Facility: [address] [place] [address]    HISTORY OF PRESENT ILLNESS:                 The patient is a 76 y.o. female whom presents with lower back and right gluteal pain with right hip pain. Review of the imaging and physical exam of the patient confirmed the pre-procedure diagnosis. After a thorough discussion of risks, benefits and alternatives informed consent was obtained. Past Medical History:   Past Medical History:   Diagnosis Date    Anxiety     Fibromyalgia     Gout     Hyperlipidemia     Hypertension     Osteoarthritis       Past Surgical History:     Past Surgical History:   Procedure Laterality Date    APPENDECTOMY  2019    HYSTERECTOMY      INNER EAR SURGERY      many    KNEE ARTHROSCOPY      RT    PAIN MANAGEMENT PROCEDURE Right 2020    RIGHT L4 AND L5 TRANSFORAMINAL EPIDURAL STEROID INJECTION WITH FLUOROSCOPY (52403, 67432) performed by Veda Molina MD at [facility]     Current Medications:   Prior to Admission medications    Medication Sig Start Date End Date Taking? Authorizing Provider   ALPRAZolam Brown Clos) 0.5 MG tablet Take 1 tablet by mouth nightly as needed for Sleep or Anxiety for up to 30 days.  20 Yes PLACIDO Lara NP   pravastatin (PRAVACHOL) 20 MG tablet TAKE 1 TABLET BY MOUTH EVERY EVENING 20  Yes Micah Brittle Wojciechowski, APRN - NP   ramipril (ALTACE) 10 MG capsule TAKE ONE CAPSULE BY MOUTH EVERY DAY 20  Yes PLACIDO Gee NP   metoprolol succinate (TOPROL XL) 25 MG extended release tablet TAKE ONE TABLET BY MOUTH 2 TIMES A DAY 20  Yes PLACIDO Gee NP   furosemide (LASIX) 80 MG tablet TAKE ONE TABLET BY MOUTH EVERY DAY 3/27/20  Yes PLACIDO Gee NP   VITAMIN D PO Take by mouth   Yes Historical Provider, MD   gabapentin (NEURONTIN) 300 MG capsule Take 300 mg by mouth 3 times daily. Yes Historical Provider, MD   acetaminophen (TYLENOL) 500 MG tablet Take 1,000 mg by mouth 3 times daily   Yes Historical Provider, MD   Multiple Vitamins-Minerals (THERAPEUTIC MULTIVITAMIN-MINERALS) tablet Take 1 tablet by mouth daily Centrum   Yes Historical Provider, MD     Allergies:  Darvocet [propoxyphene n-acetaminophen]; Lipitor [atorvastatin]; and Diclofenac  Social History:    reports that she has been smoking cigarettes. She has been smoking about 1.00 pack per day. She has never used smokeless tobacco. She reports that she does not drink alcohol or use drugs. Family History:   Family History   Problem Relation Age of Onset    Diabetes Mother        Vitals: Blood pressure 122/72, pulse 70, temperature 98.6 °F (37 °C), temperature source Temporal, resp. rate 16, height 5' 9\" (1.753 m), weight 168 lb (76.2 kg), SpO2 99 %. PHYSICAL EXAM:including affected areas  HENT: Airway patent and reviewed  Cardiovascular: Normal rate, regular rhythm, normal heart sounds. Pulmonary/Chest: No wheezes. No rhonchi. No rales. Abdominal: Soft. Bowel sounds are normal. No distension. Extremities: Moves all extremities equally  Cervical and Lumbar Spine: Painful range of motion, no midline tenderness       Diagnosis:Right SI Joint Dysfunction with right greater trochanteric bursitis  M53.3 - M70.61    Plan: Proceed with planned procedure      ASA CLASS:         []   I. Normal, healthy adult           [x]   II.  Mild systemic disease            []   III. Severe systemic disease      Mallampati: Mallampati Class II - (soft palate, fauces & uvula are visible)      Sedation plan:   [x]  Local              []  Minimal                  []  General anesthesia    Patient's condition acceptable for planned procedure/sedation.    Post Procedure Plan   Return to same level of care   ______________________     The patient was counseled at length about the risks of yuliya Covid-19 in the pradeep-operative and post-operative states including the recovery window of their procedure. The patient was made aware that yuliya Covid-19 after a surgical procedure may worsen their prognosis for recovering from the virus and lend to a higher morbidity and or mortality risk. The patient was given the options of postponing their procedure. All of the risks, benefits, and alternatives were discussed. The patient does wish to proceed with the procedure. The risks and benefits as well as alternatives to the procedure have been discussed with the patient and or family. The patient and or next of kin understands and agrees to proceed.     Chiquita Velez M.D.

## 2020-08-31 NOTE — OP NOTE
Patient:  Arnaud Strong  YOB: 1955  Medical Record #:  [id number]   Place:  [address] [place] [address]  Date:  8/31/2020   Physician:  Mundo Haider MD, SHEREE    Procedure:   Right Sacro-iliac Joint Injection with Fluoroscopy and right Greater Trochanteric bursa injection with Fluoroscopy    CPT 96792  And 94344     Pre-Procedure Diagnosis: Right SI joint dysfunction and pain and Right GT bursitis    Post-Procedure Diagnosis: Same    Sedation: Local with 1% Lidocaine 3 ml and 1 mg of IV Versed with 25 mcg of IV Fentanyl    EBL: None    Complications: None    Procedure Summary:    The patient was seen in the office for complaints of lower back and right gluteal pain. Review of the imaging and physical exam of the patient confirmed the pre-procedure diagnosis. After a thorough discussion of risks, benefits and alternatives informed consent was obtained. The patient was brought to the procedure suite and placed in the prone position. The skin overlying the sacrum was prepped and draped in the usual sterile fashion. Using rotational fluoroscopic guidance, the distal 1/3 of the right sacro-iliac joint space was identified. Through anesthetized skin a 22 gauge 3.5 inch curved tip spinal needle was advanced into the articular space. Isovue M300 was instilled showing an articular pattern. 2 ml of a solution mixed with 40 mg of depomedrol and 0.5% Marcaine was instilled. The needle was removed and a band-aid applied. The patient was placed in a lateral decubitus position. A lateral fluoroscopic image was taken. Through anesthetized skin, a 22 gauge spinal needle was advanced into the GT bursa. Isovue M 300 instilled. The remaining 40 mg of depomedrol and 0.5% Marcaine was instilled. The needle removed and a band-aid applied. The patient was transferred to the post-operative area in stable condition.

## 2020-09-14 ENCOUNTER — OFFICE VISIT (OUTPATIENT)
Dept: ORTHOPEDIC SURGERY | Age: 65
End: 2020-09-14
Payer: MEDICARE

## 2020-09-14 VITALS — TEMPERATURE: 98 F | RESPIRATION RATE: 12 BRPM | HEIGHT: 69 IN | BODY MASS INDEX: 24.88 KG/M2 | WEIGHT: 168 LBS

## 2020-09-14 PROCEDURE — 99213 OFFICE O/P EST LOW 20 MIN: CPT | Performed by: PHYSICIAN ASSISTANT

## 2020-09-14 NOTE — PROGRESS NOTES
times daily. , Disp: , Rfl:     acetaminophen (TYLENOL) 500 MG tablet, Take 1,000 mg by mouth 3 times daily, Disp: , Rfl:     Multiple Vitamins-Minerals (THERAPEUTIC MULTIVITAMIN-MINERALS) tablet, Take 1 tablet by mouth daily Centrum, Disp: , Rfl:   Allergies:  Darvocet [propoxyphene n-acetaminophen]; Lipitor [atorvastatin]; and Diclofenac  Social History:    reports that she has been smoking cigarettes. She has been smoking about 1.00 pack per day. She has never used smokeless tobacco. She reports that she does not drink alcohol or use drugs. Family History:   Family History   Problem Relation Age of Onset    Diabetes Mother        REVIEW OF SYSTEMS:   CONSTITUTIONAL: Denies unexplained weight loss, fevers, chills or fatigue  NEUROLOGICAL: Denies unsteady gait or progressive weakness  MUSCULOSKELETAL: Denies joint swelling or redness  GI: Denies nausea, vomiting, diarrhea   : Denies bowel or bladder issues       PHYSICAL EXAM:    Vitals: Temperature 98 °F (36.7 °C), resp. rate 12, height 5' 9\" (1.753 m), weight 168 lb (76.2 kg). GENERAL EXAM:  · General Apparence: Patient is adequately groomed with no evidence of malnutrition. · Psychiatric: Orientation: The patient is oriented to time, place and person. The patient's mood and affect are appropriate   · Vascular: Examination reveals no swelling and palpation reveals no tenderness in upper or lower extremities. Good capillary refill. · The lymphatic examination of the neck, axillae and groin reveals all areas to be without enlargement or induration  · Sensation is intact without deficit in the upper and left lower extremities to light touch and pinprick, decreased sensation to light touch and pinprick in the right lower extremity. · Coordination of the upper and lower extremities are normal.  · RIGHT UPPER EXTREMITY:  Inspection/examination of the right upper extremity does not show any tenderness, deformity or injury.  Range of motion is unremarkable and pain-free. There is no gross instability. There are no rashes, ulcerations or lesions. Strength and tone are normal. No atrophy or abnormal movements are noted. · LEFT UPPER EXTREMITY: Inspection/examination of the left upper extremity does not show any tenderness, deformity or injury. Range of motion is unremarkable and pain-free. There is no gross instability. There are no rashes, ulcerations or lesions. Strength and tone are normal. No atrophy or abnormal movements are noted. LUMBAR/SACRAL EXAMINATION:  · Inspection: Local inspection shows no step-off or bruising. Lumbar alignment is normal. No instability is noted. · Palpation:   No evidence of tenderness at the midline. Lumbar paraspinal tenderness: Mild L4/5 and L5/S1 tenderness with mild right SI joint tenderness. Bursal tenderness Right greater trochanteric tenderness  There is no paraspinal spasm. · Range of Motion: limited by 25% in all planes due to pain  · Strength:   Strength testing is 5/5 in all muscle groups tested. · Special Tests:   Straight leg raise and crossed SLR negative. Gerardo's testing is negative bilaterally. FADIR's testing is negative bilaterally. Bowstring test negative. Slump test negative. · Skin: There are no rashes, ulcerations or lesions. · Reflexes: Reflexes are symmetrically 1+ at the patellar and ankle tendons. Clonus absent bilaterally at the feet. · Gait & station: antalgic on the right and no ataxia  · Additional Examinations:  · RIGHT LOWER EXTREMITY: Inspection/examination of the right lower extremity does not show any tenderness, deformity or injury. Range of motion is normal and pain-free. There is no gross instability. There are no rashes, ulcerations or lesions. Strength and tone are normal. No atrophy or abnormal movements are noted. · LEFT LOWER EXTREMITY:  Inspection/examination of the left lower extremity does not show any tenderness, deformity or injury. Range of motion is normal and pain-free. There is no gross instability. There are no rashes, ulcerations or lesions. Strength and tone are normal. No atrophy or abnormal movements are noted. Diagnostic Testing:    MR Lumbar spine shows  19:  Impression:  1.  Marked focal right L4-5 foraminal disc herniation/extrusion with severe foraminal narrowing and exiting right L4 nerve root sleeve compression.  Mild anterior listhesis and central canal stenosis at this level. 2.  Mild disc bulging elsewhere with marked mid to lower lumbar hypertrophic facet arthropathy demonstrated. Results for orders placed or performed in visit on 19   Hepatitis C Antibody   Result Value Ref Range    Hep C Ab Interp Non-reactive Non-reactive   Vitamin D 25 Hydroxy   Result Value Ref Range    Vit D, 25-Hydroxy 29.8 (L) >=30 ng/mL   TSH without Reflex   Result Value Ref Range    TSH 1.19 0.27 - 4.20 uIU/mL   CK   Result Value Ref Range    Total CK 71 26 - 192 U/L     Impression:       1. Sacroiliac joint dysfunction of right side    2. Greater trochanteric bursitis of right hip    3. Lumbar radiculopathy    4. HNP (herniated nucleus pulposus), lumbar        Plan:  Clinical Course: Above diagnoses are improving     I discussed the diagnosis and the treatment options with María Huizar today. In Summary:  The various treatment options were outlined and discussed with Randa Zepeda including:  Conservative care options: physical therapy, ice, medications, bracing, and activity modification. The indications for therapeutic injections. The indications for additional imaging/laboratory studies. The indications for (possible future) interventions. After considering the various options discussed, María Huizar elected to pursue a course of treatment that includes the followin. Medications:  No further recommendations for new medications. 2. PT:  Encouraged to continue with Home exercise program.    3. Further studies: No further studies.       4.

## 2020-09-30 ENCOUNTER — TELEPHONE (OUTPATIENT)
Dept: FAMILY MEDICINE CLINIC | Age: 65
End: 2020-09-30

## 2020-09-30 RX ORDER — METOPROLOL SUCCINATE 25 MG/1
TABLET, EXTENDED RELEASE ORAL
Qty: 180 TABLET | Refills: 0 | Status: SHIPPED | OUTPATIENT
Start: 2020-09-30 | End: 2020-12-28 | Stop reason: SDUPTHER

## 2020-09-30 RX ORDER — GABAPENTIN 300 MG/1
300 CAPSULE ORAL 3 TIMES DAILY
Qty: 90 CAPSULE | Refills: 0 | Status: SHIPPED | OUTPATIENT
Start: 2020-09-30 | End: 2020-12-17

## 2020-09-30 RX ORDER — FUROSEMIDE 80 MG
TABLET ORAL
Qty: 90 TABLET | Refills: 1 | Status: SHIPPED | OUTPATIENT
Start: 2020-09-30 | End: 2021-02-01 | Stop reason: SDUPTHER

## 2020-09-30 RX ORDER — PRAVASTATIN SODIUM 20 MG
20 TABLET ORAL EVERY EVENING
Qty: 90 TABLET | Refills: 0 | Status: SHIPPED | OUTPATIENT
Start: 2020-09-30 | End: 2020-10-13

## 2020-09-30 RX ORDER — RAMIPRIL 10 MG/1
CAPSULE ORAL
Qty: 90 CAPSULE | Refills: 0 | Status: SHIPPED | OUTPATIENT
Start: 2020-09-30 | End: 2020-12-28 | Stop reason: SDUPTHER

## 2020-09-30 RX ORDER — ALPRAZOLAM 0.5 MG/1
0.5 TABLET ORAL NIGHTLY PRN
Qty: 30 TABLET | Refills: 0 | Status: SHIPPED | OUTPATIENT
Start: 2020-09-30 | End: 2020-11-02 | Stop reason: SDUPTHER

## 2020-09-30 NOTE — TELEPHONE ENCOUNTER
Medication:   Requested Prescriptions     Pending Prescriptions Disp Refills    ALPRAZolam (XANAX) 0.5 MG tablet 30 tablet 0     Sig: Take 1 tablet by mouth nightly as needed for Sleep or Anxiety for up to 30 days.  furosemide (LASIX) 80 MG tablet 90 tablet 1     Sig: TAKE ONE TABLET BY MOUTH EVERY DAY      Last Filled:      Patient Phone Number: [phone] (home)     Last appt: 7/27/2020   Next appt: 10/7/2020    Last OARRS:   RX Monitoring 4/24/2019   Attestation The Prescription Monitoring Report for this patient was reviewed today. Periodic Controlled Substance Monitoring -   Chronic Pain > 50 MEDD Obtained or confirmened \"Consent of Opioid Use\" on file. PDMP Monitoring:    Last PDMP Christa Tabares as Reviewed [facility]):  Review User Review Instant Review Result   Marychuydina Sam 7/27/2020  9:29 AM Reviewed PDMP [1]     Preferred Pharmacy:   [facility] [address], [address] [phone]  [address] [place] [address]  Phone: [phone] Fax: [employer] [address], [address] [phone] -  [phone] [address]  [facility] [address]  Phone: [phone] Fax: [phone]

## 2020-09-30 NOTE — TELEPHONE ENCOUNTER
Please call patient to advise to have labs completed prior to upcoming visit, labs are ordered, please fast 8-10 hours. Will review at appointment on 10/7/20.

## 2020-09-30 NOTE — TELEPHONE ENCOUNTER
ALPRAZolam (XANAX) 0.5 MG tablet  30 tablet  0  8/24/2020 9/23/2020     Sig - Route:  Take 1 tablet by mouth nightly as needed for Sleep or Anxiety for up to 30 days. - O      furosemide (LASIX) 80 MG tablet  90 tablet  1  3/27/2020      Sig: TAKE ONE TABLET BY MOUTH EVERY DAY             [facility]

## 2020-09-30 NOTE — TELEPHONE ENCOUNTER
pravastatin (PRAVACHOL) 20 MG tablet  90 tablet  0  7/14/2020      Sig - Route: TAKE 1 TABLET BY MOUTH EVERY EVENING - Oral      metoprolol succinate (TOPROL XL) 25 MG extended release tablet  180 tablet  0  6/25/2020      Sig: TAKE ONE TABLET BY MOUTH 2 TIMES A DAY       ramipril (ALTACE) 10 MG capsule  90 capsule  0  6/25/2020      Sig: TAKE ONE CAPSULE BY MOUTH EVERY DAY      gabapentin (NEURONTIN) 300 MG capsule         Sig - Route:  Take 300 mg by mouth 3 times daily. - Oral       [facility] [place]

## 2020-09-30 NOTE — TELEPHONE ENCOUNTER
Medication:   Requested Prescriptions     Pending Prescriptions Disp Refills    pravastatin (PRAVACHOL) 20 MG tablet 90 tablet 0     Sig: Take 1 tablet by mouth every evening    ramipril (ALTACE) 10 MG capsule 90 capsule 0     Sig: TAKE ONE CAPSULE BY MOUTH EVERY DAY    metoprolol succinate (TOPROL XL) 25 MG extended release tablet 180 tablet 0     Sig: TAKE ONE TABLET BY MOUTH 2 TIMES A DAY    gabapentin (NEURONTIN) 300 MG capsule 90 capsule      Sig: Take 1 capsule by mouth 3 times daily. Last Filled:      Patient Phone Number: [phone] (home)     Last appt: 7/27/2020   Next appt: 10/7/2020    Last OARRS:   RX Monitoring 4/24/2019   Attestation The Prescription Monitoring Report for this patient was reviewed today. Periodic Controlled Substance Monitoring -   Chronic Pain > 50 MEDD Obtained or confirmened \"Consent of Opioid Use\" on file. PDMP Monitoring:    Last PDMP Swapnil Arroyo as Reviewed [facility]):  Review User Review Instant Review Result   Abeba Navarro 7/27/2020  9:29 AM Reviewed PDMP [1]     Preferred Pharmacy:   [facility] [address], [address] [facility] [phone]  [address] [place] [address]  Phone: [phone] Fax: [place] [address], [address] [phone] - F [phone] [address] [place] [address]  Phone: [phone] Fax: [phone]

## 2020-10-03 ENCOUNTER — HOSPITAL ENCOUNTER (OUTPATIENT)
Age: 65
Discharge: HOME | End: 2020-10-03
Payer: MEDICARE

## 2020-10-03 LAB
A/G RATIO: 2 (ref 1.1–2.2)
ALBUMIN SERPL-MCNC: 4.1 G/DL (ref 3.4–5)
ALP BLD-CCNC: 48 U/L (ref 40–129)
ALT SERPL-CCNC: 9 U/L (ref 10–40)
ANION GAP SERPL CALCULATED.3IONS-SCNC: 10 MMOL/L (ref 3–16)
AST SERPL-CCNC: 17 U/L (ref 15–37)
BASOPHILS ABSOLUTE: 0 K/UL (ref 0–0.2)
BASOPHILS RELATIVE PERCENT: 0.6 %
BILIRUB SERPL-MCNC: 0.4 MG/DL (ref 0–1)
BUN BLDV-MCNC: 14 MG/DL (ref 7–20)
CALCIUM SERPL-MCNC: 9.7 MG/DL (ref 8.3–10.6)
CHLORIDE BLD-SCNC: 106 MMOL/L (ref 99–110)
CHOLESTEROL, FASTING: 217 MG/DL (ref 0–199)
CO2: 28 MMOL/L (ref 21–32)
CREAT SERPL-MCNC: 0.6 MG/DL (ref 0.6–1.2)
CREATININE URINE: 70.1 MG/DL (ref 28–259)
EOSINOPHILS ABSOLUTE: 0.2 K/UL (ref 0–0.6)
EOSINOPHILS RELATIVE PERCENT: 2.8 %
GFR AFRICAN AMERICAN: >60
GFR NON-AFRICAN AMERICAN: >60
GLOBULIN: 2.1 G/DL
GLUCOSE FASTING: 100 MG/DL (ref 70–99)
HCT VFR BLD CALC: 43.4 % (ref 36–48)
HDLC SERPL-MCNC: 60 MG/DL (ref 40–60)
HEMOGLOBIN: 14.4 G/DL (ref 12–16)
LDL CHOLESTEROL CALCULATED: 136 MG/DL
LYMPHOCYTES ABSOLUTE: 2 K/UL (ref 1–5.1)
LYMPHOCYTES RELATIVE PERCENT: 34.4 %
MCH RBC QN AUTO: 32.1 PG (ref 26–34)
MCHC RBC AUTO-ENTMCNC: 33.2 G/DL (ref 31–36)
MCV RBC AUTO: 96.7 FL (ref 80–100)
MICROALBUMIN UR-MCNC: 1.4 MG/DL
MICROALBUMIN/CREAT UR-RTO: 20 MG/G (ref 0–30)
MONOCYTES ABSOLUTE: 0.6 K/UL (ref 0–1.3)
MONOCYTES RELATIVE PERCENT: 9.4 %
NEUTROPHILS ABSOLUTE: 3.1 K/UL (ref 1.7–7.7)
NEUTROPHILS RELATIVE PERCENT: 52.8 %
PDW BLD-RTO: 13.9 % (ref 12.4–15.4)
PLATELET # BLD: 197 K/UL (ref 135–450)
PMV BLD AUTO: 8.4 FL (ref 5–10.5)
POTASSIUM SERPL-SCNC: 4 MMOL/L (ref 3.5–5.1)
RBC # BLD: 4.48 M/UL (ref 4–5.2)
SODIUM BLD-SCNC: 144 MMOL/L (ref 136–145)
TOTAL PROTEIN: 6.2 G/DL (ref 6.4–8.2)
TRIGLYCERIDE, FASTING: 106 MG/DL (ref 0–150)
VLDLC SERPL CALC-MCNC: 21 MG/DL
WBC # BLD: 5.9 K/UL (ref 4–11)

## 2020-10-03 PROCEDURE — 85025 COMPLETE CBC W/AUTO DIFF WBC: CPT

## 2020-10-03 PROCEDURE — 36415 COLL VENOUS BLD VENIPUNCTURE: CPT

## 2020-10-03 PROCEDURE — 82043 UR ALBUMIN QUANTITATIVE: CPT

## 2020-10-03 PROCEDURE — 80053 COMPREHEN METABOLIC PANEL: CPT

## 2020-10-03 PROCEDURE — 82570 ASSAY OF URINE CREATININE: CPT

## 2020-10-03 PROCEDURE — 80061 LIPID PANEL: CPT

## 2020-10-07 ENCOUNTER — OFFICE VISIT (OUTPATIENT)
Dept: FAMILY MEDICINE CLINIC | Age: 65
End: 2020-10-07
Payer: MEDICARE

## 2020-10-07 VITALS
SYSTOLIC BLOOD PRESSURE: 132 MMHG | HEART RATE: 62 BPM | TEMPERATURE: 96.8 F | WEIGHT: 160 LBS | OXYGEN SATURATION: 97 % | DIASTOLIC BLOOD PRESSURE: 84 MMHG | BODY MASS INDEX: 23.63 KG/M2

## 2020-10-07 PROBLEM — E78.2 MIXED HYPERLIPIDEMIA: Status: ACTIVE | Noted: 2020-10-07

## 2020-10-07 PROCEDURE — 99214 OFFICE O/P EST MOD 30 MIN: CPT | Performed by: NURSE PRACTITIONER

## 2020-10-07 ASSESSMENT — ENCOUNTER SYMPTOMS
SHORTNESS OF BREATH: 0
CONSTIPATION: 0
BACK PAIN: 1
CHEST TIGHTNESS: 0
NAUSEA: 0
DIARRHEA: 0

## 2020-10-07 ASSESSMENT — PATIENT HEALTH QUESTIONNAIRE - PHQ9
SUM OF ALL RESPONSES TO PHQ QUESTIONS 1-9: 0
1. LITTLE INTEREST OR PLEASURE IN DOING THINGS: 0
SUM OF ALL RESPONSES TO PHQ QUESTIONS 1-9: 0
2. FEELING DOWN, DEPRESSED OR HOPELESS: 0
SUM OF ALL RESPONSES TO PHQ9 QUESTIONS 1 & 2: 0

## 2020-10-07 NOTE — PROGRESS NOTES
RIGHT GREATER TROCHANTERIC BURSA INJECTION WITH FLUOROSCOPY (58842, [id number]) performed by Milagros Johnson MD at [facility]    KNEE ARTHROSCOPY  2009    RT    PAIN MANAGEMENT PROCEDURE Right 2/12/2020    RIGHT L4 AND L5 TRANSFORAMINAL EPIDURAL STEROID INJECTION WITH FLUOROSCOPY (14880, 66049) performed by Milagros Johnson MD at [place] [address] History   Problem Relation Age of Onset    Diabetes Mother       Social History     Tobacco Use    Smoking status: Current Every Day Smoker     Packs/day: 1.00     Types: Cigarettes    Smokeless tobacco: Never Used   Substance Use Topics    Alcohol use: No     Frequency: Never        Review of Systems   Constitutional: Negative for activity change, appetite change, fatigue and unexpected weight change. Eyes: Negative for visual disturbance. Respiratory: Negative for chest tightness and shortness of breath. Cardiovascular: Positive for leg swelling. Negative for chest pain and palpitations. Gastrointestinal: Negative for constipation, diarrhea and nausea. Musculoskeletal: Positive for arthralgias and back pain. Allergic/Immunologic: Negative for immunocompromised state. Neurological: Negative for dizziness and headaches. Psychiatric/Behavioral: Negative for dysphoric mood and sleep disturbance. The patient is not nervous/anxious. Objective:    /84 (Site: Left Upper Arm, Position: Sitting, Cuff Size: Medium Adult)   Pulse 62   Temp 96.8 °F (36 °C)   Wt 160 lb (72.6 kg)   SpO2 97%   BMI 23.63 kg/m²   Weight: 160 lb (72.6 kg)     BP Readings from Last 3 Encounters:   10/07/20 132/84   08/31/20 128/75   07/27/20 130/82     Wt Readings from Last 3 Encounters:   10/07/20 160 lb (72.6 kg)   09/14/20 168 lb (76.2 kg)   08/31/20 168 lb (76.2 kg)     BMI Readings from Last 3 Encounters:   10/07/20 23.63 kg/m²   09/14/20 24.81 kg/m²   08/31/20 24.81 kg/m²       Physical Exam  Vitals signs reviewed. Constitutional:       Appearance: Normal appearance. She is well-developed. Eyes:      Extraocular Movements: Extraocular movements intact. Pupils: Pupils are equal, round, and reactive to light. Neck:      Musculoskeletal: Neck supple. No muscular tenderness. Cardiovascular:      Rate and Rhythm: Normal rate and regular rhythm. Heart sounds: Normal heart sounds. No murmur. Pulmonary:      Effort: Pulmonary effort is normal.      Breath sounds: Normal breath sounds. Abdominal:      General: Bowel sounds are normal.      Palpations: Abdomen is soft. Musculoskeletal:      Right lower leg: Edema present. Left lower leg: Edema present. Lymphadenopathy:      Cervical: No cervical adenopathy. Skin:     General: Skin is warm and dry. Capillary Refill: Capillary refill takes less than 2 seconds. Neurological:      Mental Status: She is alert and oriented to person, place, and time. Psychiatric:         Mood and Affect: Mood normal.         Assessment/Plan    1. Essential hypertension  Continue altace 10 mg  Continue  Toprol xl 25 mg    2. Anxiety  OARRS reviewed  Continue xanax 0.5 mg    3. Mixed hyperlipidemia  Continue pravastatin 20 mg  Discussed low cholesterol diet      Return in about 3 months (around 1/7/2021) for medication re-check. This dictation was generated by voice recognition computer software. Although all attempts are made to edit the dictation for accuracy, there may be errors in the transcription that are not intended.

## 2020-10-13 RX ORDER — PRAVASTATIN SODIUM 20 MG
20 TABLET ORAL EVERY EVENING
Qty: 90 TABLET | Refills: 0 | Status: SHIPPED | OUTPATIENT
Start: 2020-10-13 | End: 2021-04-08

## 2020-11-02 RX ORDER — ALPRAZOLAM 0.5 MG/1
0.5 TABLET ORAL NIGHTLY PRN
Qty: 30 TABLET | Refills: 0 | Status: SHIPPED | OUTPATIENT
Start: 2020-11-02 | End: 2020-11-30 | Stop reason: SDUPTHER

## 2020-11-02 NOTE — TELEPHONE ENCOUNTER
----- Message from Kandace Soares sent at 10/31/2020 10:25 AM EDT -----  Subject: Message to Provider    QUESTIONS  Information for Provider? Pt would like to refill Xanax 0.5mg take at   bedtime as needed   [facility] could not find rx in chart. Please send to [facility] [address] [place] [address] [phone] - F [phone]  ---------------------------------------------------------------------------  --------------  Jin Muse INFO  What is the best way for the office to contact you? OK to leave message on   voicemail  Preferred Call Back Phone Number? [phone]  ---------------------------------------------------------------------------  --------------  SCRIPT ANSWERS  Relationship to Patient?  Self

## 2020-11-12 ENCOUNTER — TELEPHONE (OUTPATIENT)
Dept: ORTHOPEDIC SURGERY | Age: 65
End: 2020-11-12

## 2020-11-12 ENCOUNTER — OFFICE VISIT (OUTPATIENT)
Dept: ORTHOPEDIC SURGERY | Age: 65
End: 2020-11-12
Payer: MEDICARE

## 2020-11-12 VITALS — BODY MASS INDEX: 24.88 KG/M2 | WEIGHT: 168 LBS | TEMPERATURE: 97.7 F | HEIGHT: 69 IN

## 2020-11-12 PROCEDURE — 99214 OFFICE O/P EST MOD 30 MIN: CPT | Performed by: PHYSICIAN ASSISTANT

## 2020-11-12 NOTE — PROGRESS NOTES
Fibromyalgia     Gout     Hyperlipidemia     Hypertension     Osteoarthritis       Past Surgical History:     Past Surgical History:   Procedure Laterality Date    APPENDECTOMY  02/13/2019    [facility]. INJECTION PROCEDURE FOR SACROILIAC JOINT Right 8/31/2020    RIGHT SACROILIAC JOINT INJECTION AND RIGHT GREATER TROCHANTERIC BURSA INJECTION WITH FLUOROSCOPY ([id number], [id number]) performed by Felicia Parker MD at [facility]    KNEE ARTHROSCOPY  2009    RT    PAIN MANAGEMENT PROCEDURE Right 2/12/2020    RIGHT L4 AND L5 TRANSFORAMINAL EPIDURAL STEROID INJECTION WITH FLUOROSCOPY (60037, 33489) performed by Felicia Parker MD at [facility]     Current Medications:     Current Outpatient Medications:     ALPRAZolam (XANAX) 0.5 MG tablet, Take 1 tablet by mouth nightly as needed for Sleep or Anxiety for up to 30 days. , Disp: 30 tablet, Rfl: 0    pravastatin (PRAVACHOL) 20 MG tablet, TAKE 1 TABLET BY MOUTH EVERY EVENING, Disp: 90 tablet, Rfl: 0    ramipril (ALTACE) 10 MG capsule, TAKE ONE CAPSULE BY MOUTH EVERY DAY, Disp: 90 capsule, Rfl: 0    metoprolol succinate (TOPROL XL) 25 MG extended release tablet, TAKE ONE TABLET BY MOUTH 2 TIMES A DAY, Disp: 180 tablet, Rfl: 0    gabapentin (NEURONTIN) 300 MG capsule, Take 1 capsule by mouth 3 times daily for 30 days. , Disp: 90 capsule, Rfl: 0    furosemide (LASIX) 80 MG tablet, TAKE ONE TABLET BY MOUTH EVERY DAY, Disp: 90 tablet, Rfl: 1    VITAMIN D PO, Take by mouth, Disp: , Rfl:     acetaminophen (TYLENOL) 500 MG tablet, Take 1,000 mg by mouth 3 times daily, Disp: , Rfl:     Multiple Vitamins-Minerals (THERAPEUTIC MULTIVITAMIN-MINERALS) tablet, Take 1 tablet by mouth daily Centrum, Disp: , Rfl:   Allergies:  Darvocet [propoxyphene n-acetaminophen]; Lipitor [atorvastatin]; and Diclofenac  Social History:    reports that she has been smoking cigarettes. She has been smoking about 1.00 pack per day.  She has never used smokeless tobacco. She reports that she does not drink alcohol or use drugs. Family History:   Family History   Problem Relation Age of Onset    Diabetes Mother        REVIEW OF SYSTEMS:   CONSTITUTIONAL: Denies unexplained weight loss, fevers, chills or fatigue  NEUROLOGICAL: Denies unsteady gait or progressive weakness  MUSCULOSKELETAL: Denies joint swelling or redness  GI: Denies nausea, vomiting, diarrhea   : Denies bowel or bladder issues       PHYSICAL EXAM:    Vitals: Temperature 97.7 °F (36.5 °C), height 5' 9\" (1.753 m), weight 168 lb (76.2 kg). GENERAL EXAM:  · General Apparence: Patient is adequately groomed with no evidence of malnutrition. · Psychiatric: Orientation: The patient is oriented to time, place and person. The patient's mood and affect are appropriate   · Vascular: Examination reveals no swelling and palpation reveals no tenderness in upper or lower extremities. Good capillary refill. 2+ pitting. · The lymphatic examination of the neck, axillae and groin reveals all areas to be without enlargement or induration. · Sensation is intact without deficit in the upper and lower extremities to light touch. · Coordination of the upper and lower extremities are normal.  · RIGHT UPPER EXTREMITY:  Inspection/examination of the right upper extremity does not show any tenderness, deformity or injury. Range of motion is unremarkable and pain-free. There is no gross instability. There are no rashes, ulcerations or lesions. Strength and tone are normal. No atrophy or abnormal movements are noted. · LEFT UPPER EXTREMITY: Inspection/examination of the left upper extremity does not show any tenderness, deformity or injury. Range of motion is unremarkable and pain-free. There is no gross instability. There are no rashes, ulcerations or lesions. Strength and tone are normal. No atrophy or abnormal movements are noted. LUMBAR/SACRAL EXAMINATION:  · Inspection: Local inspection shows no step-off or bruising. Lumbar alignment is normal. No instability is noted. · Palpation:   No evidence of tenderness at the midline. Lumbar paraspinal tenderness: Mild L4/5 and L5/S1 tenderness  Bursal tenderness No tenderness bilaterally  There is no paraspinal spasm. · Range of Motion: limited by 25% in all planes due to pain  · Strength:   Strength testing is 5/5 in all muscle groups tested. · Special Tests:   Straight leg raise positive right and negative left and crossed SLR negative. Gerardo's testing is negative bilaterally. FADIR's testing is negative bilaterally. · Skin: There are no rashes, ulcerations or lesions. · Reflexes: Reflexes are symmetrically 2+ at the patellar and ankle tendons. Clonus absent bilaterally at the feet. · Gait & station: normal, patient ambulates without assistance and no ataxia  · Additional Examinations:  · RIGHT LOWER EXTREMITY: Inspection/examination of the right lower extremity does not show any tenderness, deformity or injury. Range of motion is normal and pain-free. There is no gross instability. There are no rashes, ulcerations or lesions. Strength and tone are normal. No atrophy or abnormal movements are noted. · LEFT LOWER EXTREMITY:  Inspection/examination of the left lower extremity does not show any tenderness, deformity or injury. Range of motion is normal and pain-free. There is no gross instability. There are no rashes, ulcerations or lesions. Strength and tone are normal. No atrophy or abnormal movements are noted. Diagnostic Testing:    MR Lumbar spine shows from 6/4/19:   Impression:  1.  Marked focal right L4-5 foraminal disc herniation/extrusion with severe foraminal narrowing and exiting right L4 nerve root sleeve compression.  Mild anterior listhesis and central canal stenosis at this level. 2.  Mild disc bulging elsewhere with marked mid to lower lumbar hypertrophic facet arthropathy demonstrated.     Results for orders placed or performed during the hospital 4. Greater trochanteric bursitis of right hip        Plan:  Clinical Course: Above diagnoses are worsening    I discussed the diagnosis and the treatment options with Augusta Arriaga today. In Summary:  The various treatment options were outlined and discussed with Randa Zepeda including:  Conservative care options: physical therapy, ice, medications, bracing, and activity modification. The indications for therapeutic injections. The indications for additional imaging/laboratory studies. The indications for (possible future) interventions. After considering the various options discussed, Augusta Arriaga elected to pursue a course of treatment that includes the followin. Medications:  No further recommendations for new medications. 2. PT:  Encouraged to continue with Home exercise program.    3. Further studies: No further studies. 4. Interventional:  We discussed pursuing a Right L4 and L5 TF epidural steroid injection to address the pain. Radiologic imaging and symptoms confirm the pain etiology. Risks, benefits and alternatives of interventional options were discussed. These include and are not limited to bleeding, infection, spinal headache, nerve injury and lack of pain relief. The patient verbalized understanding and would like to proceed. The patient will be scheduled accordingly. LUIS DANIEL #2. Repeat Therapeutic LUIS DANIEL with positive relief from first therapeutic injection    As such, I have confirmed the patient has met the general requirements including failure of conservative management including prescription strength analgesics, adjunctive medications were utilized , therapeutic exercise program, and no underlying addiction or behavioral disorders were identified to the ability of the provider. Symptoms are impacting their ADLs or iADLs such as walking and significant pain was noted in the HPI.  Imaging studies as noted in the diagnostic imaging section of the consultation were reviewed and correlated with clinical findings. Fluoroscopy is utilized for interventional procedures. A repeat injection is being recommended due to at least 50% pain reduction and functional improvement of at least 3 weeks duration. The patient notes significant functional limitations and continues to adhere to conservative treatment between injections. 5. Follow up:  4-6 weeks      Janice Koch was instructed to call the office if her symptoms worsen or if new symptoms appear prior to the next scheduled visit. She is specifically instructed to contact the office between now & her scheduled appointment if she has concerns related to her condition or if she needs assistance in scheduling the above tests. She is welcome to call for an appointment sooner if she has any additional concerns or questions. PAULIE Dubon, PA-C  Board Certified by the [employer] on Certification of Physician Assistants  Shae Bridges 57  Partner of [facility] ([facility])         This dictation was performed with a verbal recognition program [facility]) and it was checked for errors. It is possible that there are still dictated errors within this office note. If so, please bring any errors to my attention for an addendum. All efforts were made to ensure that this office note is accurate.

## 2020-11-12 NOTE — LETTER
Please schedule the following with:     Date:   20 @ 7:00    Account: [de-identified]  Patient: Caio Mtz    : 1955  Address:  [address] [place] [address]    Phone (H):  [phone] (home)      ----------------------------------------------------------------------------------------------  Diagnosis:     ICD-10-CM    1. Lumbar radiculopathy  M54.16    2. HNP (herniated nucleus pulposus), lumbar  M51.26    3. Sacroiliac joint dysfunction of right side  M53.3    4. Greater trochanteric bursitis of right hip  M70.61      Procedure: Transforaminal Epidural Steroid Injection     Levels: L4 and L5  Side: Right   CPT Codes 01662, 59991    ----------------------------------------------------------------------------------------------  Injection # 2  [facility]    Attending Physician       Kathy Lisa.  Colt Moeller MD.  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, negative test when last tested    · Infection control  · Tested positive for MRSA in past 12 months:  no  · Tested positive for MSSA \"staph infection\" in past 12 months: no  · Tested positive for VRE (Vancomycin Resistant Enterococci) in past 12 months:   no  · Currently on any antibiotics for an infection: no  · Anticoagulants:  · On a blood thinner:  no   · Any history of bleeding disorder: no   · Advanced Liver disease: no   · Advanced Renal disease: no   · Glaucoma: no   · Diabetes: no     Sedation:  Yes  -----------------------------------------------------------------------------------------------  Allergies   Allergen Reactions    Darvocet [Propoxyphene N-Acetaminophen]     Lipitor [Atorvastatin]      Leg cramping    Diclofenac Rash

## 2020-11-13 ENCOUNTER — TELEPHONE (OUTPATIENT)
Dept: ORTHOPEDIC SURGERY | Age: 65
End: 2020-11-13

## 2020-11-18 ENCOUNTER — TELEPHONE (OUTPATIENT)
Dept: ORTHOPEDIC SURGERY | Age: 65
End: 2020-11-18

## 2020-11-18 NOTE — TELEPHONE ENCOUNTER
11/17/2020  [place]sted via [employer] by online thru [employer] w/clinicals.   Reference # [id number]

## 2020-11-20 NOTE — TELEPHONE ENCOUNTER
Auth: # [id number]    Date: 12/02/2020 thru 12/15/2020  Type of SX:  Outpatient LUIS DANIEL  Location: [facility]  CPT: 68632, 00487   DX Code: M54.16, M51.26, M53.3, M70.61  SX area: Lumbar spine  Insurance: [id number] Medicare    CPT: 19884 47, 63952  [facility]

## 2020-11-27 NOTE — PROGRESS NOTES
PATIENT REACHED   YES____NO__X__    PREOP INSTUCTIONS LEFT ON  DLCIDI___[phone]____________      DATE__12/2/2020_______ TIME__0700_______ARRIVAL__0600______PLACE___[facility]_________  NOTHING TO EAT OR DRINK  AFTER MIDNIGHT THE EVENING PRIOR OR AS INSTRUCTED BY YOUR DR.  Lidia Drummond NEED A RESPONSIBLE ADULT AGE 18 OR OLDER TO DRIVE YOU HOME  PLEASE BRING INSURANCE CARD. PICTURE ID AND COMPLETE LIST OF MEDS  WEAR LOOSE COMFORTABLE CLOTHING  FOLLOW ANY INSTRUCTIONS YOUR DRS OFFICE HAS GIVEN YOU,INCLUDING WHAT MEDICATIONS TO TAKE THE AM OF PROCEDURE AND WHEN AND IF YOU NEED TO STOP ANY BLOOD THINNERS. IF YOU HAVE QUESTIONS REGARDING THIS CALL THE OFFICE  THE GOAL BLOOD SUGAR THE AM OF PROCEDURE  OR LESS ABOVE THAT THE PROCEDURE MAY BE CANCELLED  ANY QUESTIONS CALL YOUR DOCTOR. ALSO,PLEASE READ THE INSTRUCTION PACKET FROM YOUR DR IF YOU RECEIVED ONE. SPINE INTERVENTION NUMBER [phone]  There is a one visitor policy at [facility] for all surgeries and endoscopies. Whether the visitor can stay or will be asked to wait in the car will depend on the current policy and if social distancing can be maintained. The policy is subject to change at any time. Please make sure the visitor has a cell phone that is on,charged and able to accept calls, as this may be the way that the staff communicates with them. Pain management is NO VISITOR policyThe patients ride is expected to remain in the car with a cell phone for communication. If the ride is leaving the hospital grounds please make sure they are back in time for pickup. Have the patient inform the staff on arrival what their rides plans are while the patient is in the facility. At the MAIN there is one visitor allowed. Please note that the visitor policy is subject to change.

## 2020-11-30 RX ORDER — ALPRAZOLAM 0.5 MG/1
0.5 TABLET ORAL NIGHTLY PRN
Qty: 30 TABLET | Refills: 0 | Status: SHIPPED | OUTPATIENT
Start: 2020-11-30 | End: 2020-12-28 | Stop reason: SDUPTHER

## 2020-11-30 NOTE — TELEPHONE ENCOUNTER
----- Message from Erika Cade sent at 11/28/2020  9:23 AM EST -----  Subject: Refill Request    QUESTIONS  Name of Medication? ALPRAZolam (XANAX) 0.5 MG tablet  Patient-reported dosage and instructions? once a day  How many days do you have left? 2  Preferred Pharmacy? [address] [phone] - f [phone]  Pharmacy phone number (if available)? [phone]  Additional Information for Provider? Patient also wanted to know if she   could get a slight increase on the medication. She is under a lot of   stress at this time. She stated she is not getting any sleep   ---------------------------------------------------------------------------  --------------  CALL BACK INFO  What is the best way for the office to contact you? OK to leave message on   voicemail  Preferred Call Back Phone Number?  [phone]

## 2020-11-30 NOTE — TELEPHONE ENCOUNTER
Medication:   Requested Prescriptions     Pending Prescriptions Disp Refills    ALPRAZolam (XANAX) 0.5 MG tablet 30 tablet 0     Sig: Take 1 tablet by mouth nightly as needed for Sleep or Anxiety for up to 30 days. Last Filled:  11/2/20    Patient Phone Number: [phone] (home)     Last appt: 10/7/2020   Next appt: 1/7/2021    Last OARRS:   RX Monitoring 4/24/2019   Attestation The Prescription Monitoring Report for this patient was reviewed today. Periodic Controlled Substance Monitoring -   Chronic Pain > 50 MEDD Obtained or confirmened \"Consent of Opioid Use\" on file.      PDMP Monitoring:    Last PDMP Wilner Lang as Reviewed [facility]):  Review User Review Instant Review Result   Yvrose Lucia 10/7/2020  9:32 AM Reviewed PDMP [1]     Preferred Pharmacy:     [facility] [address] [facility]  [phone] [facility] [phone]  [address] [place] [address]  Phone: [phone] Fax: [phone]

## 2020-12-02 ENCOUNTER — HOSPITAL ENCOUNTER (OUTPATIENT)
Age: 65
Setting detail: OUTPATIENT SURGERY
Discharge: HOME | End: 2020-12-02
Attending: PHYSICAL MEDICINE & REHABILITATION | Admitting: PHYSICAL MEDICINE & REHABILITATION
Payer: MEDICARE

## 2020-12-02 ENCOUNTER — APPOINTMENT (OUTPATIENT)
Dept: GENERAL RADIOLOGY | Age: 65
End: 2020-12-02
Attending: PHYSICAL MEDICINE & REHABILITATION
Payer: MEDICARE

## 2020-12-02 VITALS
BODY MASS INDEX: 23.4 KG/M2 | RESPIRATION RATE: 16 BRPM | WEIGHT: 158 LBS | OXYGEN SATURATION: 97 % | TEMPERATURE: 98.1 F | DIASTOLIC BLOOD PRESSURE: 80 MMHG | SYSTOLIC BLOOD PRESSURE: 107 MMHG | HEIGHT: 69 IN | HEART RATE: 65 BPM

## 2020-12-02 PROCEDURE — 3610000056 HC PAIN LEVEL 4 BASE (NON-OR): Performed by: PHYSICAL MEDICINE & REHABILITATION

## 2020-12-02 PROCEDURE — 99152 MOD SED SAME PHYS/QHP 5/>YRS: CPT | Performed by: PHYSICAL MEDICINE & REHABILITATION

## 2020-12-02 PROCEDURE — 6360000002 HC RX W HCPCS: Performed by: PHYSICAL MEDICINE & REHABILITATION

## 2020-12-02 PROCEDURE — 2709999900 HC NON-CHARGEABLE SUPPLY: Performed by: PHYSICAL MEDICINE & REHABILITATION

## 2020-12-02 PROCEDURE — 3209999900 FLUORO FOR SURGICAL PROCEDURES

## 2020-12-02 PROCEDURE — 2500000003 HC RX 250 WO HCPCS: Performed by: PHYSICAL MEDICINE & REHABILITATION

## 2020-12-02 RX ORDER — FENTANYL CITRATE 50 UG/ML
INJECTION, SOLUTION INTRAMUSCULAR; INTRAVENOUS
Status: COMPLETED | OUTPATIENT
Start: 2020-12-02 | End: 2020-12-02

## 2020-12-02 RX ORDER — BUPIVACAINE HYDROCHLORIDE 5 MG/ML
INJECTION, SOLUTION EPIDURAL; INTRACAUDAL
Status: COMPLETED | OUTPATIENT
Start: 2020-12-02 | End: 2020-12-02

## 2020-12-02 RX ORDER — BETAMETHASONE SODIUM PHOSPHATE AND BETAMETHASONE ACETATE 3; 3 MG/ML; MG/ML
INJECTION, SUSPENSION INTRA-ARTICULAR; INTRALESIONAL; INTRAMUSCULAR; SOFT TISSUE
Status: COMPLETED | OUTPATIENT
Start: 2020-12-02 | End: 2020-12-02

## 2020-12-02 RX ORDER — LIDOCAINE HYDROCHLORIDE 10 MG/ML
INJECTION, SOLUTION INFILTRATION; PERINEURAL
Status: COMPLETED | OUTPATIENT
Start: 2020-12-02 | End: 2020-12-02

## 2020-12-02 RX ORDER — MIDAZOLAM HYDROCHLORIDE 1 MG/ML
INJECTION INTRAMUSCULAR; INTRAVENOUS
Status: COMPLETED | OUTPATIENT
Start: 2020-12-02 | End: 2020-12-02

## 2020-12-02 ASSESSMENT — PAIN DESCRIPTION - DESCRIPTORS: DESCRIPTORS: ACHING

## 2020-12-02 ASSESSMENT — PAIN SCALES - GENERAL: PAINLEVEL_OUTOF10: 2

## 2020-12-02 ASSESSMENT — PAIN - FUNCTIONAL ASSESSMENT
PAIN_FUNCTIONAL_ASSESSMENT: 0-10
PAIN_FUNCTIONAL_ASSESSMENT: PREVENTS OR INTERFERES SOME ACTIVE ACTIVITIES AND ADLS

## 2020-12-02 NOTE — PROGRESS NOTES
IV discontinued, catheter intact, and dressing applied. Procedural dressing dry and intact. Bilateral lower extremities equal in strength. Discharge instructions reviewed with patient or responsible adult, signed and copy given. All home medications have been reviewed. All questions answered and patient or responsible adult verbalized understanding.   PAIN LEVEL AT DISCHARGE 2_____

## 2020-12-02 NOTE — H&P
HISTORY AND PHYSICAL/PRE-SEDATION ASSESSMENT    Patient:  Emma Burkett   :  1955  Medical Record No.:  [id number]   Date:  2020  Physician:  Felicia Parker M.D. Facility: [address] [place] [address]    HISTORY OF PRESENT ILLNESS:                 The patient is a 76 y.o. female whom presents with low back and right leg pain. Review of the imaging and physical exam of the patient confirmed the pre-procedure diagnosis. After a thorough discussion of risks, benefits and alternatives informed consent was obtained. Past Medical History:   Past Medical History:   Diagnosis Date    Anxiety     Fibromyalgia     Gout     Hyperlipidemia     Hypertension     Osteoarthritis       Past Surgical History:     Past Surgical History:   Procedure Laterality Date    APPENDECTOMY  2019    [facility]. INJECTION PROCEDURE FOR SACROILIAC JOINT Right 2020    RIGHT SACROILIAC JOINT INJECTION AND RIGHT GREATER TROCHANTERIC BURSA INJECTION WITH FLUOROSCOPY (35975, 77102) performed by Felicia Parker MD at [facility]    KNEE ARTHROSCOPY      RT    PAIN MANAGEMENT PROCEDURE Right 2020    RIGHT L4 AND L5 TRANSFORAMINAL EPIDURAL STEROID INJECTION WITH FLUOROSCOPY (01153, 50889) performed by Felicia Parker MD at [facility]     Current Medications:   Prior to Admission medications    Medication Sig Start Date End Date Taking? Authorizing Provider   ALPRAZolam Alfcolby Mccall) 0.5 MG tablet Take 1 tablet by mouth nightly as needed for Sleep or Anxiety for up to 30 days.  20 Yes PLACIDO Desouza - NP   pravastatin (PRAVACHOL) 20 MG tablet TAKE 1 TABLET BY MOUTH EVERY EVENING 10/13/20  Yes PLACIDO Desouza NP   ramipril (ALTACE) 10 MG capsule TAKE ONE CAPSULE BY MOUTH EVERY DAY 20  Yes PLACIDO Yoder - NP   metoprolol succinate (TOPROL XL) 25 MG extended release tablet TAKE ONE TABLET BY MOUTH 2 TIMES A DAY 9/30/20  Yes PLACIDO Murray NP   gabapentin (NEURONTIN) 300 MG capsule Take 1 capsule by mouth 3 times daily for 30 days. 9/30/20 12/2/20 Yes PLACIDO Murray NP   furosemide (LASIX) 80 MG tablet TAKE ONE TABLET BY MOUTH EVERY DAY 9/30/20  Yes PLACIDO Murray NP   VITAMIN D PO Take by mouth   Yes Historical Provider, MD   acetaminophen (TYLENOL) 500 MG tablet Take 1,000 mg by mouth 3 times daily   Yes Historical Provider, MD   Multiple Vitamins-Minerals (THERAPEUTIC MULTIVITAMIN-MINERALS) tablet Take 1 tablet by mouth daily Centrum   Yes Historical Provider, MD     Allergies:  Darvocet [propoxyphene n-acetaminophen]; Lipitor [atorvastatin]; and Diclofenac  Social History:    reports that she has been smoking cigarettes. She has been smoking about 1.00 pack per day. She has never used smokeless tobacco. She reports that she does not drink alcohol or use drugs. Family History:   Family History   Problem Relation Age of Onset    Diabetes Mother        Vitals: Blood pressure 125/81, pulse 78, temperature 98.1 °F (36.7 °C), temperature source Temporal, resp. rate 18, height 5' 9\" (1.753 m), weight 158 lb (71.7 kg), SpO2 98 %. PHYSICAL EXAM:including affected areas  HENT: Airway patent and reviewed  Cardiovascular: Normal rate, regular rhythm, normal heart sounds. Pulmonary/Chest: No wheezes. No rhonchi. No rales. Abdominal: Soft. Bowel sounds are normal. No distension. Extremities: Moves all extremities equally  Cervical and Lumbar Spine: Painful range of motion, no midline tenderness       Diagnosis:Lumbar radiculopathy  M54.16   M51.26    M53.3   M70.61    Plan: Proceed with planned procedure      ASA CLASS:         []   I. Normal, healthy adult           [x]   II.  Mild systemic disease            []   III.   Severe systemic disease      Mallampati: Mallampati Class II - (soft palate, fauces & uvula are visible)      Sedation plan:   [x]  Local              []  Minimal                  []  General anesthesia    Patient's condition acceptable for planned procedure/sedation. Post Procedure Plan   Return to same level of care   ______________________     The patient was counseled at length about the risks of yuliya Covid-19 in the pradeep-operative and post-operative states including the recovery window of their procedure. The patient was made aware that yuliya Covid-19 after a surgical procedure may worsen their prognosis for recovering from the virus and lend to a higher morbidity and or mortality risk. The patient was given the options of postponing their procedure. All of the risks, benefits, and alternatives were discussed. The patient does wish to proceed with the procedure. The risks and benefits as well as alternatives to the procedure have been discussed with the patient and or family. The patient and or next of kin understands and agrees to proceed.     Leon Soulier, M.D.

## 2020-12-02 NOTE — PROGRESS NOTES
Teaching / education initiated regarding perioperative experience, expectations, and pain management during stay. Patient verbalized understanding.  Brandon Morris RN

## 2020-12-02 NOTE — OP NOTE
Patient:  Ruth Taveras  YOB: 1955  Medical Record #:  [id number]   Place: [address] [place] [address]  Date:  12/2/2020   Physician:  Camila Jaimes MD, SHEREE    Procedure: 1. Transforaminal Lumbar Epidural Steroid Injection -  right L4  CPT 27673          2. Transforaminal Lumbar Epidural Steroid Injection -  right L5  CPT 04957    Pre-Procedure Diagnosis: Lumbar radiculopathy      Post-Procedure Diagnosis: Same    Sedation: Local with 1% Lidocaine 2.5 ml and 1 mg of IV Versed and 25 mcg of IV Fentanyl     EBL: None    Complications: None    Procedure Summary:        The patient was brought to the procedure suite and placed in the prone position. The skin overlying the lumbar spine was prepped and draped in the usual sterile fashion. Using fluoroscopic guidance, the right L4 foramen was identified. Through anesthetized skin, a 22 gauge 3.5 inch curved tip spinal needle was advanced into the foramen. Isovue M 300 was instilled showing an epidurogram/nerve root outline pattern without evidence of vascular or intrathecal spread. Following which, 7.5 mg of Celestone mixed with 1 ml of 0.5% Marcaine was instilled. The needle was removed. Using fluoroscopic guidance, the right L5 foramen was identified. Through anesthetized skin, a 22 gauge 3.5 inch curved tip spinal needle was advanced into the foramen. Isovue M 300 was instilled showing an epidurogram/nerve root outline pattern without evidence of vascular or intrathecal spread. Following which, 7.5 mg of Celestone mixed with 1 ml of 0.5% Marcaine was instilled. The needle was removed and band-aids were applied. The patient was transferred to the post-operative area in stable condition.

## 2020-12-14 ENCOUNTER — TELEPHONE (OUTPATIENT)
Dept: FAMILY MEDICINE CLINIC | Age: 65
End: 2020-12-14

## 2020-12-15 NOTE — TELEPHONE ENCOUNTER
There has not been many if any studies regarding interactions with medications. It is an emergency approval.  Only contraindication that is definite if patient's have severe allergies to foods, meds, etc that require Epi pen administration to avoid getting COVID vaccination.

## 2020-12-17 ENCOUNTER — OFFICE VISIT (OUTPATIENT)
Dept: ORTHOPEDIC SURGERY | Age: 65
End: 2020-12-17
Payer: MEDICARE

## 2020-12-17 VITALS — TEMPERATURE: 97 F | WEIGHT: 158 LBS | RESPIRATION RATE: 12 BRPM | HEIGHT: 69 IN | BODY MASS INDEX: 23.4 KG/M2

## 2020-12-17 PROCEDURE — 99213 OFFICE O/P EST LOW 20 MIN: CPT | Performed by: PHYSICIAN ASSISTANT

## 2020-12-17 NOTE — PROGRESS NOTES
Follow up: Hua Garza  1955  [id number]         Chief Complaint   Patient presents with    Lower Back Pain     F/u RIght L4 & Right L5 TF 12/2 (#2)         HISTORY OF PRESENT ILLNESS:  Ms. Luisa Fletcher is a 76 y.o. female returns for a follow up visit for multiple medical problems. Her current presenting problems are   1. Lumbar radiculopathy    2. HNP (herniated nucleus pulposus), lumbar    3. Paresthesia of right lower extremity    4. Tarsal tunnel syndrome of right side    . As per information/history obtained from the PADT(patient assessment and documentation tool) - She complains of pain in the lower back with radiation to the upper leg Right and lower leg Right She rates the pain 4/10 and describes it as numbness. Pain is made worse by: standing. She denies side effects from the current pain regimen. Patient reports that since the last follow up visit the physical functioning is unchanged, family/social relationships are unchanged, mood is unchanged and sleep patterns are unchanged, and that the overall functioning is unchanged. Patient denies neurological bowel or bladder. The patient presents today in follow-up after a right L4 and L5 transforaminal epidural steroid injection was completed on 12/2/2020. The patient describes that she is 70% improved following this procedure concerning her pain however she does still continue to have right leg numbness from the knee into the foot. The patient describes that this has not improved following the injection. The patient describes that standing increases her pain and numbness is the biggest contributing factor. She describes it laying flat improves her pain and overall symptoms. The patient can sit for 30 minutes, stand for 1 hour, and walk for 1 hour. The patient did have an EMG of the left lower extremity and an MRI a year ago. The patient is hoping that the new MRI can be completed at this time.       Associated signs and symptoms:   Neurogenic bowel or bladder symptoms:  no   Perceived weakness:  yes   Difficulty walking:  yes            Past medical, surgical, social and family history reviewed with the patient. Past Medical History:   Past Medical History:   Diagnosis Date    Anxiety     Fibromyalgia     Gout     Hyperlipidemia     Hypertension     Osteoarthritis       Past Surgical History:     Past Surgical History:   Procedure Laterality Date    APPENDECTOMY  02/13/2019    [facility]. INJECTION PROCEDURE FOR SACROILIAC JOINT Right 8/31/2020    RIGHT SACROILIAC JOINT INJECTION AND RIGHT GREATER TROCHANTERIC BURSA INJECTION WITH FLUOROSCOPY (23998, 47378) performed by Hetal [facility]born MD at [facility]    KNEE ARTHROSCOPY  2009    RT    PAIN MANAGEMENT PROCEDURE Right 2/12/2020    RIGHT L4 AND L5 TRANSFORAMINAL EPIDURAL STEROID INJECTION WITH FLUOROSCOPY (79143, 05586) [facility] 12/2/2020    RIGHT L4 AND L5 TRANSFORAMINAL EPIDURAL STEROID INJECTION WITH FLUOROSCOPY performed by Hetal Osborn MD at [facility]     Current Medications:     Current Outpatient Medications:     ALPRAZolam (XANAX) 0.5 MG tablet, Take 1 tablet by mouth nightly as needed for Sleep or Anxiety for up to 30 days. , Disp: 30 tablet, Rfl: 0    pravastatin (PRAVACHOL) 20 MG tablet, TAKE 1 TABLET BY MOUTH EVERY EVENING, Disp: 90 tablet, Rfl: 0    ramipril (ALTACE) 10 MG capsule, TAKE ONE CAPSULE BY MOUTH EVERY DAY, Disp: 90 capsule, Rfl: 0    metoprolol succinate (TOPROL XL) 25 MG extended release tablet, TAKE ONE TABLET BY MOUTH 2 TIMES A DAY, Disp: 180 tablet, Rfl: 0    furosemide (LASIX) 80 MG tablet, TAKE ONE TABLET BY MOUTH EVERY DAY, Disp: 90 tablet, Rfl: 1    VITAMIN D PO, Take by mouth, Disp: , Rfl:     acetaminophen (TYLENOL) 500 MG tablet, Take 1,000 mg by mouth 3 times daily, Disp: , Rfl:     Multiple Vitamins-Minerals (THERAPEUTIC MULTIVITAMIN-MINERALS) tablet, Take 1 tablet by mouth daily Centrum, Disp: , Rfl:   Allergies:  Darvocet [propoxyphene n-acetaminophen], Lipitor [atorvastatin], and Diclofenac  Social History:    reports that she has been smoking cigarettes. She has been smoking about 1.00 pack per day. She has never used smokeless tobacco. She reports that she does not drink alcohol or use drugs. Family History:   Family History   Problem Relation Age of Onset    Diabetes Mother        REVIEW OF SYSTEMS:   CONSTITUTIONAL: Denies unexplained weight loss, fevers, chills or fatigue  NEUROLOGICAL: Denies unsteady gait or progressive weakness  MUSCULOSKELETAL: Denies joint swelling or redness  GI: Denies nausea, vomiting, diarrhea   : Denies bowel or bladder issues       PHYSICAL EXAM:    Vitals: Temperature 97 °F (36.1 °C), resp. rate 12, height 5' 9\" (1.753 m), weight 158 lb (71.7 kg). GENERAL EXAM:  · General Apparence: Patient is adequately groomed with no evidence of malnutrition. · Psychiatric: Orientation: The patient is oriented to time, place and person. The patient's mood and affect are appropriate   · Vascular: Examination reveals no swelling and palpation reveals no tenderness in upper extremities. Bilateral lower extremity swelling with no tenderness to palpation. Good capillary refill. · Sensation is intact without deficit in the upper and left lower extremities to light touch. Decreased sensation in the right lower extremity to light touch. · Coordination of the upper and lower extremities are normal.  · RIGHT UPPER EXTREMITY:  Inspection/examination of the right upper extremity does not show any tenderness, deformity or injury. Range of motion is unremarkable and pain-free. There is no gross instability. There are no rashes, ulcerations or lesions. Strength and tone are normal. No atrophy or abnormal movements are noted.   · LEFT UPPER EXTREMITY: Inspection/examination of the left upper extremity does not show any tenderness, deformity or injury. Range of motion is unremarkable and pain-free. There is no gross instability. There are no rashes, ulcerations or lesions. Strength and tone are normal. No atrophy or abnormal movements are noted. LUMBAR/SACRAL EXAMINATION:  · Inspection: Local inspection shows no step-off or bruising. Lumbar alignment is normal. No instability is noted. · Palpation:   No evidence of tenderness at the midline. Lumbar paraspinal tenderness: No tenderness to palpation of the lumbar paraspinals  Bursal tenderness No tenderness bilaterally  There is no paraspinal spasm. · Range of Motion: limited by 25% in all planes due to pain  · Strength:   Strength testing is 5/5 in all muscle groups tested. · Special Tests:   Straight leg raise mildly positive right and negative left and crossed SLR negative. Gerardo's testing is negative bilaterally. FADIR's testing is negative bilaterally. · Skin: There are no rashes, ulcerations or lesions. · Reflexes: Reflexes are symmetrically 1+ at the patellar and ankle tendons. Clonus absent bilaterally at the feet. · Gait & station: normal, patient ambulates without assistance and no ataxia  · Additional Examinations:  · RIGHT LOWER EXTREMITY: Inspection/examination of the right lower extremity does not show any tenderness, deformity or injury. Range of motion is normal and pain-free. There is no gross instability. There are no rashes, ulcerations or lesions. Strength and tone are normal. No atrophy or abnormal movements are noted. · LEFT LOWER EXTREMITY:  Inspection/examination of the left lower extremity does not show any tenderness, deformity or injury. Range of motion is normal and pain-free. There is no gross instability. There are no rashes, ulcerations or lesions. Strength and tone are normal. No atrophy or abnormal movements are noted.       Diagnostic Testing:    MR Lumbar spine shows from 6/4/19:   Impression:  1.  Marked focal Not Established mg/dL   Microalbumin / Creatinine Urine Ratio   Result Value Ref Range    Microalbumin, Random Urine 1.40 <2.0 mg/dL    Creatinine, Ur 70.1 28.0 - 259.0 mg/dL    Microalbumin Creatinine Ratio 20.0 0.0 - 30.0 mg/g     Impression:       1. Lumbar radiculopathy    2. HNP (herniated nucleus pulposus), lumbar    3. Paresthesia of right lower extremity    4. Tarsal tunnel syndrome of right side        Plan:  Clinical Course: Above diagnoses are improving diagnoses 1 and 2 ; unchanged diagnoses 3 and 4. I discussed the diagnosis and the treatment options with Micaela He today. In Summary:  The various treatment options were outlined and discussed with Randa Zepeda including:  Conservative care options: physical therapy, ice, medications, bracing, and activity modification. The indications for therapeutic injections. The indications for additional imaging/laboratory studies. The indications for (possible future) interventions. After considering the various options discussed, Micaela He elected to pursue a course of treatment that includes the followin. Medications:  No further recommendations for new medications. 2. PT:  Encouraged to continue with Home exercise program.    3. Further studies: Setup Lumbar MR without contrast to evaluate for soft tissue pathology or stenosis contributing to the back pain and paresthesia. The patient has failed a six week trial of a HEP program within the last 3 months. 4. Interventional:  After further imaging is obtained, interventional options will be reviewed and recommended. We did discuss a possible consult with Dr. Jade Samuel for the right tarsal tunnel that was found on the EMG from earlier this year. 5. Follow up:  1-2 weeks      Micaela He was instructed to call the office if her symptoms worsen or if new symptoms appear prior to the next scheduled visit.  She is specifically instructed to contact the office between now

## 2020-12-18 ENCOUNTER — TELEPHONE (OUTPATIENT)
Dept: ORTHOPEDIC SURGERY | Age: 65
End: 2020-12-18

## 2020-12-22 ENCOUNTER — HOSPITAL ENCOUNTER (OUTPATIENT)
Dept: MRI IMAGING | Age: 65
Discharge: HOME | End: 2020-12-22
Payer: MEDICARE

## 2020-12-22 PROCEDURE — 72148 MRI LUMBAR SPINE W/O DYE: CPT

## 2020-12-22 RX ORDER — GABAPENTIN 300 MG/1
300 CAPSULE ORAL 3 TIMES DAILY
Qty: 90 CAPSULE | Refills: 0 | OUTPATIENT
Start: 2020-12-22 | End: 2021-01-21

## 2020-12-22 NOTE — TELEPHONE ENCOUNTER
Disp Refills Start End    gabapentin (NEURONTIN) 300 MG capsule (Discontinued) 90 capsule 0 9/30/2020 12/17/2020    Sig - Route: Take 1 capsule by mouth 3 times daily for 30 days. - [address] [facility] [phone] - F [phone]    Please advise

## 2020-12-24 ENCOUNTER — TELEPHONE (OUTPATIENT)
Dept: ORTHOPEDIC SURGERY | Age: 65
End: 2020-12-24

## 2020-12-24 NOTE — TELEPHONE ENCOUNTER
General Question     Subject: QUESTIONS REGARDING PRESCRIPTION  Patient and /or Facility Request: Jennifer Feliciano  Contact Number: [phone]

## 2020-12-24 NOTE — TELEPHONE ENCOUNTER
Patient calling back about gabapentin (NEURONTIN) 300 MG capsule. She does not understand why in the system it says it was discontinued when she takes it all the time (it looks like it was discontinued on 12/17 when she went to see Ortho). She would like a call back.       Provider out of office  Please advise

## 2020-12-28 RX ORDER — GABAPENTIN 300 MG/1
300 CAPSULE ORAL 3 TIMES DAILY
Qty: 90 CAPSULE | Refills: 0 | OUTPATIENT
Start: 2020-12-28 | End: 2021-01-27

## 2020-12-28 RX ORDER — RAMIPRIL 10 MG/1
CAPSULE ORAL
Qty: 90 CAPSULE | Refills: 0 | Status: SHIPPED | OUTPATIENT
Start: 2020-12-28 | End: 2021-03-25

## 2020-12-28 RX ORDER — METOPROLOL SUCCINATE 25 MG/1
TABLET, EXTENDED RELEASE ORAL
Qty: 180 TABLET | Refills: 0 | Status: SHIPPED | OUTPATIENT
Start: 2020-12-28 | End: 2021-03-25

## 2020-12-28 RX ORDER — ALPRAZOLAM 0.5 MG/1
0.5 TABLET ORAL NIGHTLY PRN
Qty: 30 TABLET | Refills: 0 | Status: SHIPPED | OUTPATIENT
Start: 2020-12-28 | End: 2021-02-01 | Stop reason: SDUPTHER

## 2020-12-28 NOTE — TELEPHONE ENCOUNTER
Pt needs refills on her:   ramipril (ALTACE) 10 MG capsule   ALPRAZolam (XANAX) 0.5 MG tablet   metoprolol succinate (TOPROL XL) 25 MG extended release tablet     Last seen on 10/7/20    Last office note states Return in about 3 months (around 1/7/2021) for medication re-check.   F/u appt scheduled for 1/7/21

## 2020-12-28 NOTE — TELEPHONE ENCOUNTER
Please notify patient that looks like on 12/17, medication was discontinued by Ortho, please reach out.

## 2020-12-28 NOTE — TELEPHONE ENCOUNTER
metoprolol succinate (TOPROL XL) 25 MG extended release tablet 180 tablet 0 9/30/2020     Sig: TAKE ONE TABLET BY MOUTH 2 TIMES A DAY      ramipril (ALTACE) 10 MG capsule 90 capsule 0 9/30/2020     Sig: TAKE ONE CAPSULE BY MOUTH EVERY DAY      ALPRAZolam (XANAX) 0.5 MG tablet 30 tablet 0 11/30/2020 12/30/2020    Sig - Route:  Take 1 tablet by mouth nightly as needed for Sleep or Anxiety for up to 30 days. - [facility]  Pharmacy in chart

## 2020-12-28 NOTE — TELEPHONE ENCOUNTER
Pt needs refill on: gabapentin (NEURONTIN) 300 MG capsule      Last seen on 10/7/20    Last note states Return in about 3 months (around 1/7/2021) for medication re-check  F/u appt scheduled for 1/26/21

## 2021-01-07 ENCOUNTER — OFFICE VISIT (OUTPATIENT)
Dept: FAMILY MEDICINE CLINIC | Age: 66
End: 2021-01-07
Payer: MEDICARE

## 2021-01-07 VITALS
HEART RATE: 72 BPM | TEMPERATURE: 97.3 F | DIASTOLIC BLOOD PRESSURE: 86 MMHG | WEIGHT: 154 LBS | BODY MASS INDEX: 22.81 KG/M2 | HEIGHT: 69 IN | OXYGEN SATURATION: 99 % | SYSTOLIC BLOOD PRESSURE: 144 MMHG

## 2021-01-07 DIAGNOSIS — I10 ESSENTIAL HYPERTENSION: Primary | ICD-10-CM

## 2021-01-07 DIAGNOSIS — E78.2 MIXED HYPERLIPIDEMIA: ICD-10-CM

## 2021-01-07 DIAGNOSIS — M13.0 POLYARTHROPATHY, MULTIPLE SITES: ICD-10-CM

## 2021-01-07 DIAGNOSIS — H93.13 BILATERAL TINNITUS: ICD-10-CM

## 2021-01-07 DIAGNOSIS — F41.9 ANXIETY: Primary | ICD-10-CM

## 2021-01-07 DIAGNOSIS — J01.10 ACUTE NON-RECURRENT FRONTAL SINUSITIS: ICD-10-CM

## 2021-01-07 PROCEDURE — 99214 OFFICE O/P EST MOD 30 MIN: CPT | Performed by: NURSE PRACTITIONER

## 2021-01-07 RX ORDER — AMOXICILLIN AND CLAVULANATE POTASSIUM 875; 125 MG/1; MG/1
1 TABLET, FILM COATED ORAL 2 TIMES DAILY
Qty: 20 TABLET | Refills: 0 | Status: SHIPPED | OUTPATIENT
Start: 2021-01-07 | End: 2021-01-17

## 2021-01-07 RX ORDER — GABAPENTIN 300 MG/1
300 CAPSULE ORAL 3 TIMES DAILY
Qty: 180 CAPSULE | Refills: 1 | Status: SHIPPED | OUTPATIENT
Start: 2021-01-07 | End: 2021-05-03

## 2021-01-07 ASSESSMENT — PATIENT HEALTH QUESTIONNAIRE - PHQ9
SUM OF ALL RESPONSES TO PHQ QUESTIONS 1-9: 0
SUM OF ALL RESPONSES TO PHQ QUESTIONS 1-9: 0
1. LITTLE INTEREST OR PLEASURE IN DOING THINGS: 0

## 2021-01-07 ASSESSMENT — ENCOUNTER SYMPTOMS
SINUS PRESSURE: 1
BACK PAIN: 1
COUGH: 0
SHORTNESS OF BREATH: 0

## 2021-01-07 NOTE — PROGRESS NOTES
Raj Michelle  : 1955  Encounter date: 2021    This dominick 76 y.o. female who presents with  Chief Complaint   Patient presents with    Hypertension     BP 3 mo f/u     Medication Check       History of present illness:    HPI Pt is 76year old female for medication recheck on xanax 0.5 mg daily. Pt reports well controlled but recently upset by denial of gabapentin due to discontinued gabapentin on 20. Pt reports taking for bilateral hand and arm numbness following cervical degeneration. Unable to locate reason for discontinuation other than \"list cleanup\", reached out to ortho, unable to get response. Restarted medication. Pt reports increased tinnitus in bilateral ears in 3 weeks, could correlate with being off gabapentin. Pt also reports mild frontal HA and pressure, denies fevers or cough, mild sinus congestion. Pt is due for repeat labs in 3 months. Current Outpatient Medications on File Prior to Visit   Medication Sig Dispense Refill    metoprolol succinate (TOPROL XL) 25 MG extended release tablet TAKE ONE TABLET BY MOUTH 2 TIMES A  tablet 0    ramipril (ALTACE) 10 MG capsule TAKE ONE CAPSULE BY MOUTH EVERY DAY 90 capsule 0    ALPRAZolam (XANAX) 0.5 MG tablet Take 1 tablet by mouth nightly as needed for Sleep or Anxiety for up to 30 days. 30 tablet 0    pravastatin (PRAVACHOL) 20 MG tablet TAKE 1 TABLET BY MOUTH EVERY EVENING 90 tablet 0    furosemide (LASIX) 80 MG tablet TAKE ONE TABLET BY MOUTH EVERY DAY 90 tablet 1    VITAMIN D PO Take by mouth      acetaminophen (TYLENOL) 500 MG tablet Take 1,000 mg by mouth 3 times daily      Multiple Vitamins-Minerals (THERAPEUTIC MULTIVITAMIN-MINERALS) tablet Take 1 tablet by mouth daily Centrum       No current facility-administered medications on file prior to visit.        Allergies   Allergen Reactions    Darvocet [Propoxyphene N-Acetaminophen]     Lipitor [Atorvastatin]      Leg cramping    Diclofenac Rash Past Medical History:   Diagnosis Date    Anxiety     Fibromyalgia     Gout     Hyperlipidemia     Hypertension     Osteoarthritis       Past Surgical History:   Procedure Laterality Date    APPENDECTOMY  02/13/2019    [facility]. INJECTION PROCEDURE FOR SACROILIAC JOINT Right 8/31/2020    RIGHT SACROILIAC JOINT INJECTION AND RIGHT GREATER TROCHANTERIC BURSA INJECTION WITH FLUOROSCOPY (03480, [id number]) performed by D[facility]tha [facility] at [facility]    KNEE ARTHROSCOPY  2009    RT    PAIN MANAGEMENT PROCEDURE Right 2/12/2020    RIGHT L4 AND L5 TRANSFORAMINAL EPIDURAL STEROID INJECTION WITH FLUOROSCOPY (78164, [id number]) performed by Doretha Roberto MD at [address] [place] [address] Right 12/2/2020    RIGHT L4 AND L5 TRANSFORAMINAL EPIDURAL STEROID INJECTION WITH FLUOROSCOPY performed by Doretha Roberto MD at [place] [address] History   Problem Relation Age of Onset    Diabetes Mother       Social History     Tobacco Use    Smoking status: Current Every Day Smoker     Packs/day: 1.00     Types: Cigarettes    Smokeless tobacco: Never Used   Substance Use Topics    Alcohol use: No     Frequency: Never        Review of Systems   Constitutional: Negative for activity change, chills, fatigue and fever. HENT: Positive for congestion, sinus pressure and tinnitus. Negative for ear discharge and ear pain. Respiratory: Negative for cough and shortness of breath. Musculoskeletal: Positive for arthralgias and back pain. Neurological: Positive for headaches. Psychiatric/Behavioral: Negative for dysphoric mood and sleep disturbance. The patient is not nervous/anxious.         Objective:    BP (!) 144/86 (Site: Left Upper Arm, Position: Sitting, Cuff Size: Medium Adult)   Pulse 72   Temp 97.3 °F (36.3 °C) (Temporal)   Ht 5' 9\" (1.753 m)   Wt 154 lb (69.9 kg)   SpO2 99%   BMI 22.74 kg/m²   Weight: 154 lb (69.9 kg)     BP Readings from Last 3 Encounters: 01/07/21 (!) 144/86   12/02/20 107/80   10/07/20 132/84     Wt Readings from Last 3 Encounters:   01/07/21 154 lb (69.9 kg)   12/17/20 158 lb (71.7 kg)   12/02/20 158 lb (71.7 kg)     BMI Readings from Last 3 Encounters:   01/07/21 22.74 kg/m²   12/17/20 23.33 kg/m²   12/02/20 23.33 kg/m²       Physical Exam  Vitals signs reviewed. Constitutional:       Appearance: Normal appearance. She is well-developed. HENT:      Right Ear: Tympanic membrane normal.      Left Ear: Tympanic membrane normal.      Nose: Congestion present. Right Sinus: Frontal sinus tenderness present. Left Sinus: Frontal sinus tenderness present. Cardiovascular:      Rate and Rhythm: Normal rate and regular rhythm. Heart sounds: Normal heart sounds. No murmur. Pulmonary:      Effort: Pulmonary effort is normal.      Breath sounds: Normal breath sounds. Musculoskeletal:      Right lower leg: No edema. Left lower leg: No edema. Skin:     General: Skin is warm and dry. Neurological:      Mental Status: She is alert and oriented to person, place, and time. Psychiatric:         Mood and Affect: Mood normal.         Assessment/Plan    1. Anxiety  OARRS reviewed  Recent refill medication    2. Polyarthropathy, multiple sites  Refill sent  - gabapentin (NEURONTIN) 300 MG capsule; Take 1 capsule by mouth 3 times daily for 30 days. Dispense: 180 capsule; Refill: 1    3. Acute non-recurrent frontal sinusitis  Advised to hold on starting antibiotic following reintroduction gabapentin  If symptoms persist start ABX  - amoxicillin-clavulanate (AUGMENTIN) 875-125 MG per tablet; Take 1 tablet by mouth 2 times daily for 10 days  Dispense: 20 tablet; Refill: 0    4. Tinnitus  Discussed possible ENT referral if symptoms persist    Return in about 3 months (around 4/7/2021) for medication re-check, lab review, hypertension re-check, unresolved symptoms. This dictation was generated by voice recognition computer software. Although all attempts are made to edit the dictation for accuracy, there may be errors in the transcription that are not intended.

## 2021-01-11 ENCOUNTER — OFFICE VISIT (OUTPATIENT)
Dept: ORTHOPEDIC SURGERY | Age: 66
End: 2021-01-11
Payer: MEDICARE

## 2021-01-11 VITALS — WEIGHT: 154.1 LBS | RESPIRATION RATE: 14 BRPM | BODY MASS INDEX: 22.82 KG/M2 | TEMPERATURE: 96.9 F | HEIGHT: 69 IN

## 2021-01-11 DIAGNOSIS — M48.061 LUMBAR FORAMINAL STENOSIS: ICD-10-CM

## 2021-01-11 DIAGNOSIS — M54.16 LUMBAR RADICULOPATHY: Primary | ICD-10-CM

## 2021-01-11 DIAGNOSIS — M51.26 HNP (HERNIATED NUCLEUS PULPOSUS), LUMBAR: ICD-10-CM

## 2021-01-11 DIAGNOSIS — R20.2 PARESTHESIA OF RIGHT LOWER EXTREMITY: ICD-10-CM

## 2021-01-11 PROCEDURE — 99213 OFFICE O/P EST LOW 20 MIN: CPT | Performed by: PHYSICIAN ASSISTANT

## 2021-01-11 NOTE — PROGRESS NOTES
Follow up: Hua Garza  1955  [id number]         Chief Complaint   Patient presents with    Lower Back Pain     TR MRI LSP - 1-2/10, aching, burning, numbness w/ walking and standing. rest helps w/ symptoms. HISTORY OF PRESENT ILLNESS:  Ms. Jarvis Lopez is a 76 y.o. female returns for a follow up visit for multiple medical problems. Her current presenting problems are   1. Lumbar radiculopathy    2. Lumbar foraminal stenosis    3. HNP (herniated nucleus pulposus), lumbar    4. Paresthesia of right lower extremity    . As per information/history obtained from the PADT(patient assessment and documentation tool) - She complains of pain in the lower back with radiation to the upper leg Right and lower leg Right She rates the pain 2/10 and describes it as aching, burning, numbness. Pain is made worse by: walking, standing. She denies side effects from the current pain regimen. Patient reports that since the last follow up visit the physical functioning is better, family/social relationships are better, mood is unchanged and sleep patterns are unchanged, and that the overall functioning is unchanged. Patient denies neurological bowel or bladder. The patient presents today in follow-up after an MRI of the lumbar spine was completed. The patient was last seen in the office on 12/17/2020 when she was following up to a right L4 and L5 transforaminal epidural steroid injection. She describes that even at this time she feels 90% improved and will have some pain with walking and standing but overall is feeling continually better. She describes that rest improves her pain. The patient can sit, stand, and walk for approximately 30 minutes without a considerable amount of pain. Associated signs and symptoms:   Neurogenic bowel or bladder symptoms:  no   Perceived weakness:  yes   Difficulty walking:  no            Past medical, surgical, social and family history reviewed with the patient. Past Medical History:   Past Medical History:   Diagnosis Date    Anxiety     Fibromyalgia     Gout     Hyperlipidemia     Hypertension     Osteoarthritis       Past Surgical History:     Past Surgical History:   Procedure Laterality Date    APPENDECTOMY  02/13/2019    [facility]. INJECTION PROCEDURE FOR SACROILIAC JOINT Right 8/31/2020    RIGHT SACROILIAC JOINT INJECTION AND RIGHT GREATER TROCHANTERIC BURSA INJECTION WITH FLUOROSCOPY ([id number], [id number]) performed by Emma Barron MD at [facility]    KNEE ARTHROSCOPY  2009    RT    PAIN MANAGEMENT PROCEDURE Right 2/12/2020    RIGHT L4 AND L5 TRANSFORAMINAL EPIDURAL STEROID INJECTION WITH FLUOROSCOPY ([id number], 93178) performed by Emma Barron MD at [address] [place] [address] Right 12/2/2020    RIGHT L4 AND L5 TRANSFORAMINAL EPIDURAL STEROID INJECTION WITH FLUOROSCOPY performed by [facility] MD at [facility]     Current Medications:     Current Outpatient Medications:     Atorvastatin Calcium (LIPITOR PO), Take by mouth, Disp: , Rfl:     Propoxyphene N-Acetaminophen (DARVOCET A500 PO), Take by mouth, Disp: , Rfl:     diclofenac sodium (VOLTAREN) 1 % GEL, Apply topically 2 times daily, Disp: , Rfl:     gabapentin (NEURONTIN) 300 MG capsule, Take 1 capsule by mouth 3 times daily for 30 days. , Disp: 180 capsule, Rfl: 1    amoxicillin-clavulanate (AUGMENTIN) 875-125 MG per tablet, Take 1 tablet by mouth 2 times daily for 10 days, Disp: 20 tablet, Rfl: 0    metoprolol succinate (TOPROL XL) 25 MG extended release tablet, TAKE ONE TABLET BY MOUTH 2 TIMES A DAY, Disp: 180 tablet, Rfl: 0    ramipril (ALTACE) 10 MG capsule, TAKE ONE CAPSULE BY MOUTH EVERY DAY, Disp: 90 capsule, Rfl: 0    ALPRAZolam (XANAX) 0.5 MG tablet, Take 1 tablet by mouth nightly as needed for Sleep or Anxiety for up to 30 days. , Disp: 30 tablet, Rfl: 0    pravastatin (PRAVACHOL) 20 MG tablet, TAKE 1 TABLET BY MOUTH EVERY EVENING, Disp: 90 tablet, Rfl: 0    furosemide (LASIX) 80 MG tablet, TAKE ONE TABLET BY MOUTH EVERY DAY, Disp: 90 tablet, Rfl: 1    VITAMIN D PO, Take by mouth, Disp: , Rfl:     acetaminophen (TYLENOL) 500 MG tablet, Take 1,000 mg by mouth 3 times daily, Disp: , Rfl:     Multiple Vitamins-Minerals (THERAPEUTIC MULTIVITAMIN-MINERALS) tablet, Take 1 tablet by mouth daily Centrum, Disp: , Rfl:   Allergies:  Darvocet [propoxyphene n-acetaminophen], Lipitor [atorvastatin], and Diclofenac  Social History:    reports that she has been smoking cigarettes. She has been smoking about 1.00 pack per day. She has never used smokeless tobacco. She reports that she does not drink alcohol or use drugs. Family History:   Family History   Problem Relation Age of Onset    Diabetes Mother        REVIEW OF SYSTEMS:   CONSTITUTIONAL: Denies unexplained weight loss, fevers, chills or fatigue  NEUROLOGICAL: Denies unsteady gait or progressive weakness  MUSCULOSKELETAL: Denies joint swelling or redness  GI: Denies nausea, vomiting, diarrhea   : Denies bowel or bladder issues       PHYSICAL EXAM:    Vitals: Temperature 96.9 °F (36.1 °C), temperature source Infrared, resp. rate 14, height 5' 9.02\" (1.753 m), weight 154 lb 1.6 oz (69.9 kg). GENERAL EXAM:  · General Apparence: Patient is adequately groomed with no evidence of malnutrition. · Psychiatric: Orientation: The patient is oriented to time, place and person. The patient's mood and affect are appropriate   · Vascular: Examination reveals no swelling and palpation reveals no tenderness in upper or lower extremities. Good capillary refill. · Sensation is intact without deficit in the upper and left lower extremities to light touch. Decreased sensation right lower extremity to light touch. · Coordination of the upper and lower extremities are normal.  · RIGHT UPPER EXTREMITY:  Inspection/examination of the right upper extremity does not show any tenderness, deformity or injury. Range of motion is unremarkable and pain-free. There is no gross instability. There are no rashes, ulcerations or lesions. Strength and tone are normal. No atrophy or abnormal movements are noted. · LEFT UPPER EXTREMITY: Inspection/examination of the left upper extremity does not show any tenderness, deformity or injury. Range of motion is unremarkable and pain-free. There is no gross instability. There are no rashes, ulcerations or lesions. Strength and tone are normal. No atrophy or abnormal movements are noted. LUMBAR/SACRAL EXAMINATION:  · Inspection: Local inspection shows no step-off or bruising. Lumbar alignment is normal. No instability is noted. · Palpation:   No evidence of tenderness at the midline. Lumbar paraspinal tenderness: Mild L4/5 and L5/S1 tenderness  Bursal tenderness No tenderness bilaterally  There is no paraspinal spasm. · Range of Motion: pain-free ROM  · Strength:   Strength testing is 5/5 in all muscle groups tested. · Special Tests:   Straight leg raise mildly positive right and negative left and crossed SLR negative. Gerardo's testing is negative bilaterally. FADIR's testing is negative bilaterally. · Skin: There are no rashes, ulcerations or lesions. · Reflexes: Reflexes are symmetrically 2+ at the patellar and ankle tendons. Clonus absent bilaterally at the feet. · Gait & station: normal, patient ambulates without assistance and no ataxia  · Additional Examinations:  · RIGHT LOWER EXTREMITY: Inspection/examination of the right lower extremity does not show any tenderness, deformity or injury. Range of motion is normal and pain-free. There is no gross instability. There are no rashes, ulcerations or lesions. Strength and tone are normal. No atrophy or abnormal movements are noted. · LEFT LOWER EXTREMITY:  Inspection/examination of the left lower extremity does not show any tenderness, deformity or injury. Range of motion is normal and pain-free.  There is no gross instability. There are no rashes, ulcerations or lesions. Strength and tone are normal. No atrophy or abnormal movements are noted. Diagnostic Testing:    MR Lumbar spine shows  from 12/22/20:  FINDINGS:   BONES/ALIGNMENT: There is levoscoliosis of the lumbar spine centered at the   L4 level.  The vertebral body heights appear maintained.  There is grade 1   anterolisthesis at L4-L5.  No spondylolisthesis at the remaining levels. There is loss of disc space height with endplate irregularity as well as   Modic type degenerative endplate changes at E58-Y81.  Minimal Modic type 1   degenerative endplate changes at S8-X0.  Minimal bone marrow edema is seen   within the right L5 facet/pedicle.       SPINAL CORD: The conus terminates at a normal level.       SOFT TISSUES: No paraspinal mass identified.       T11-T12: There is a disc bulge indenting on the ventral thecal sac.  No   significant spinal canal stenosis.  No significant neural foraminal narrowing.       L1-L2: There is a disc bulge contacting the ventral thecal sac.  No   significant spinal canal stenosis.  Facet arthrosis contributes to minimal   bilateral neural foraminal narrowing.       L2-L3: There is a disc bulge contacting the ventral thecal sac.  No   significant spinal canal stenosis.  Facet arthrosis contributes to minimal   bilateral neural foraminal narrowing.       L3-L4: There is a disc bulge with a left far-lateral annular tear contacting   the ventral thecal sac.  No significant spinal canal stenosis.  Facet   arthrosis contributes to minimal bilateral neural foraminal narrowing.       L4-L5: There is a disc bulge with a right far lateral disc protrusion as well   as a left foraminal annular tear.  Buckling of the ligamentum flavum and   facet arthrosis also seen.  There is mild-to-moderate spinal canal stenosis.    Moderate right and minimal left neural foraminal narrowing.       L5-S1: There is a disc bulge with a left foraminal annular tear along with   bilateral facet arthrosis.  Minimal right and mild left neural foraminal   narrowing.  No significant spinal canal stenosis.         Impression   1. Mild-to-moderate spinal canal stenosis at L4-L5.  No significant spinal   canal stenosis at the remaining levels. 2. Multilevel neural foraminal narrowing is seen as above.  This is most   severe on the right at L4-L5. 3. Grade 1 anterolisthesis at L4-L5 with minimal Modic type 1 degenerative   endplate changes. 4. Loss of disc space height with endplate irregularity as well as Modic type   degenerative endplate changes at C25-Y35.   5. Levoscoliosis centered at L4.        Results for orders placed or performed during the hospital encounter of 10/03/20   Comprehensive Metabolic Panel, Fasting   Result Value Ref Range    Sodium 144 136 - 145 mmol/L    Potassium 4.0 3.5 - 5.1 mmol/L    Chloride 106 99 - 110 mmol/L    CO2 28 21 - 32 mmol/L    Anion Gap 10 3 - 16    Glucose, Fasting 100 (H) 70 - 99 mg/dL    BUN 14 7 - 20 mg/dL    CREATININE 0.6 0.6 - 1.2 mg/dL    GFR Non-African American >60 >60    GFR African American >60 >60    Calcium 9.7 8.3 - 10.6 mg/dL    Total Protein 6.2 (L) 6.4 - 8.2 g/dL    Alb 4.1 3.4 - 5.0 g/dL    Albumin/Globulin Ratio 2.0 1.1 - 2.2    Total Bilirubin 0.4 0.0 - 1.0 mg/dL    Alkaline Phosphatase 48 40 - 129 U/L    ALT 9 (L) 10 - 40 U/L    AST 17 15 - 37 U/L    Globulin 2.1 g/dL   CBC Auto Differential   Result Value Ref Range    WBC 5.9 4.0 - 11.0 K/uL    RBC 4.48 4.00 - 5.20 M/uL    Hemoglobin 14.4 12.0 - 16.0 g/dL    Hematocrit 43.4 36.0 - 48.0 %    MCV 96.7 80.0 - 100.0 fL    MCH 32.1 26.0 - 34.0 pg    MCHC 33.2 31.0 - 36.0 g/dL    RDW 13.9 12.4 - 15.4 %    Platelets 154 242 - 063 K/uL    MPV 8.4 5.0 - 10.5 fL    Neutrophils % 52.8 %    Lymphocytes % 34.4 %    Monocytes % 9.4 %    Eosinophils % 2.8 %    Basophils % 0.6 %    Neutrophils Absolute 3.1 1.7 - 7.7 K/uL    Lymphocytes Absolute 2.0 1.0 - 5.1 K/uL    Monocytes Absolute 0.6 0.0 - 1.3 K/uL    Eosinophils Absolute 0.2 0.0 - 0.6 K/uL    Basophils Absolute 0.0 0.0 - 0.2 K/uL   Lipid, Fasting   Result Value Ref Range    Cholesterol, Fasting 217 (H) 0 - 199 mg/dL    Triglyceride, Fasting 106 0 - 150 mg/dL    HDL 60 40 - 60 mg/dL    LDL Calculated 136 (H) <100 mg/dL    VLDL Cholesterol Calculated 21 Not Established mg/dL   Microalbumin / Creatinine Urine Ratio   Result Value Ref Range    Microalbumin, Random Urine 1.40 <2.0 mg/dL    Creatinine, Ur 70.1 28.0 - 259.0 mg/dL    Microalbumin Creatinine Ratio 20.0 0.0 - 30.0 mg/g     Impression:       1. Lumbar radiculopathy    2. Lumbar foraminal stenosis    3. HNP (herniated nucleus pulposus), lumbar    4. Paresthesia of right lower extremity        Plan:  Clinical Course: Above diagnoses are improving     I discussed the diagnosis and the treatment options with Sahara Mckinney today. In Summary:  The various treatment options were outlined and discussed with Randa Zepeda including:  Conservative care options: physical therapy, ice, medications, bracing, and activity modification. The indications for therapeutic injections. The indications for additional imaging/laboratory studies. The indications for (possible future) interventions. After considering the various options discussed, Sahara Mckinney elected to pursue a course of treatment that includes the followin. Medications:  No further recommendations for new medications. 2. PT:  Encouraged to continue with Home exercise program.    3. Further studies: No further studies. The MRI of the Lumbar Spine was reviewed with the patient today in the office. 4. Interventional:  The patient is 90% improved following a LESI. 5. Follow up:  As needed. Sahara Mckinney was instructed to call the office if her symptoms worsen or if new symptoms appear prior to the next scheduled visit.  She is specifically instructed to contact the office between now & her

## 2021-01-26 ENCOUNTER — OFFICE VISIT (OUTPATIENT)
Dept: RHEUMATOLOGY | Age: 66
End: 2021-01-26
Payer: MEDICARE

## 2021-01-26 VITALS
SYSTOLIC BLOOD PRESSURE: 136 MMHG | HEART RATE: 72 BPM | BODY MASS INDEX: 22.96 KG/M2 | DIASTOLIC BLOOD PRESSURE: 84 MMHG | WEIGHT: 155 LBS | HEIGHT: 69 IN | TEMPERATURE: 97.5 F

## 2021-01-26 DIAGNOSIS — M79.7 FIBROMYALGIA: Primary | ICD-10-CM

## 2021-01-26 PROCEDURE — 99213 OFFICE O/P EST LOW 20 MIN: CPT | Performed by: INTERNAL MEDICINE

## 2021-01-27 ENCOUNTER — TELEPHONE (OUTPATIENT)
Dept: FAMILY MEDICINE CLINIC | Age: 66
End: 2021-01-27

## 2021-01-27 DIAGNOSIS — F41.9 ANXIETY: ICD-10-CM

## 2021-01-27 DIAGNOSIS — G47.09 OTHER INSOMNIA: ICD-10-CM

## 2021-01-27 NOTE — TELEPHONE ENCOUNTER
Patient states she no longer goes to [facility] due the network change - they are not with [address] anymore. Patient would like a recommendation of Ortho specialist within [address] and will need a referral sent to them for Ortho.       Please advise

## 2021-01-27 NOTE — TELEPHONE ENCOUNTER
Disp Refills Start End    furosemide (LASIX) 80 MG tablet 90 tablet 1 9/30/2020     Sig: TAKE ONE TABLET BY MOUTH EVERY DAY       Disp Refills Start End    ALPRAZolam (XANAX) 0.5 MG tablet 30 tablet 0 12/28/2020 1/27/2021    Sig - Route:  Take 1 tablet by mouth nightly as needed for Sleep or Anxiety for up to 30 days. - [facility], [place] - [address] [place] [address] -  [phone]      Please advise

## 2021-01-28 NOTE — TELEPHONE ENCOUNTER
Medication:   Requested Prescriptions     Pending Prescriptions Disp Refills    ALPRAZolam (XANAX) 0.5 MG tablet 30 tablet 0     Sig: Take 1 tablet by mouth nightly as needed for Sleep or Anxiety for up to 30 days.  furosemide (LASIX) 80 MG tablet 90 tablet 1     Sig: TAKE ONE TABLET BY MOUTH EVERY DAY        Patient Phone Number: [phone] (home)     Last appt: 1/7/2021   Next appt: 4/7/2021    Last OARRS:   RX Monitoring 4/24/2019   Attestation The Prescription Monitoring Report for this patient was reviewed today. Periodic Controlled Substance Monitoring -   Chronic Pain > 50 MEDD Obtained or confirmened \"Consent of Opioid Use\" on file.        Last PDMP Schmitt Anna as Reviewed [facility]):  Review User Review Instant Review Result   Rocio Lucero 1/7/2021  9:50 AM Reviewed PDMP [1]     Preferred Pharmacy:   [facility] [address] [address]  Phone: [phone] Fax: [phone]

## 2021-02-01 DIAGNOSIS — M47.896 OTHER SPONDYLOSIS, LUMBAR REGION: Primary | ICD-10-CM

## 2021-02-01 RX ORDER — ALPRAZOLAM 0.5 MG/1
0.5 TABLET ORAL NIGHTLY PRN
Qty: 30 TABLET | Refills: 0 | Status: SHIPPED | OUTPATIENT
Start: 2021-02-01 | End: 2021-03-01 | Stop reason: SDUPTHER

## 2021-02-01 RX ORDER — FUROSEMIDE 80 MG
TABLET ORAL
Qty: 90 TABLET | Refills: 1 | Status: SHIPPED | OUTPATIENT
Start: 2021-02-01 | End: 2021-07-15

## 2021-02-01 NOTE — TELEPHONE ENCOUNTER
LVM for pt to call back. Please inform pt of the new referral to Dr. Michael Grayson and for her to call and schedule an appt with his office [phone].

## 2021-02-03 DIAGNOSIS — M47.896 OTHER SPONDYLOSIS, LUMBAR REGION: Primary | ICD-10-CM

## 2021-02-26 DIAGNOSIS — F41.9 ANXIETY: ICD-10-CM

## 2021-02-26 DIAGNOSIS — G47.09 OTHER INSOMNIA: ICD-10-CM

## 2021-02-26 NOTE — TELEPHONE ENCOUNTER
ALPRAZolam (XANAX) 0.5 MG tablet 30 tablet 0 2/1/2021 3/3/2021    Sig - Route:  Take 1 tablet by mouth nightly as needed for Sleep or Anxiety for up to 30 days. - Oral      [facility] in chart

## 2021-02-26 NOTE — TELEPHONE ENCOUNTER
Medication:   Requested Prescriptions     Pending Prescriptions Disp Refills    ALPRAZolam (XANAX) 0.5 MG tablet 30 tablet 0     Sig: Take 1 tablet by mouth nightly as needed for Sleep or Anxiety for up to 30 days. Last Filled:  2/1/21    Patient Phone Number: [phone] (home)     Last appt: 1/7/2021   Next appt: 4/7/2021    Last OARRS:   RX Monitoring 4/24/2019   Attestation The Prescription Monitoring Report for this patient was reviewed today. Periodic Controlled Substance Monitoring -   Chronic Pain > 50 MEDD Obtained or confirmened \"Consent of Opioid Use\" on file. PDMP Monitoring:    Last PDMP Moshe Zambrano as Reviewed [facility]):  Review User Review Instant Review Result   Anthony Salguerosabina 1/7/2021  9:50 AM Reviewed PDMP [1]     Preferred Pharmacy:   [address] [place] [address] [address], 43 [facility] [phone]  [address] [place] [address]  Phone: [phone] Fax: [address], [address] [phone] -  [phone]  [address] [address]  Phone: [phone] Fax: [phone]    [address] [phone] Rell Recinos [phone]  [address] [place] [address]  Phone: [phone] Fax: [phone]

## 2021-03-01 RX ORDER — ALPRAZOLAM 0.5 MG/1
0.5 TABLET ORAL NIGHTLY PRN
Qty: 30 TABLET | Refills: 0 | Status: SHIPPED | OUTPATIENT
Start: 2021-03-01 | End: 2021-04-02 | Stop reason: SDUPTHER

## 2021-03-15 ENCOUNTER — OFFICE VISIT (OUTPATIENT)
Dept: FAMILY MEDICINE CLINIC | Age: 66
End: 2021-03-15
Payer: MEDICARE

## 2021-03-15 ENCOUNTER — NURSE TRIAGE (OUTPATIENT)
Dept: OTHER | Facility: CLINIC | Age: 66
End: 2021-03-15

## 2021-03-15 VITALS
WEIGHT: 154.2 LBS | OXYGEN SATURATION: 96 % | DIASTOLIC BLOOD PRESSURE: 82 MMHG | SYSTOLIC BLOOD PRESSURE: 134 MMHG | HEART RATE: 69 BPM | BODY MASS INDEX: 22.76 KG/M2

## 2021-03-15 DIAGNOSIS — M25.561 ACUTE PAIN OF BOTH KNEES: Primary | ICD-10-CM

## 2021-03-15 DIAGNOSIS — M25.562 ACUTE PAIN OF BOTH KNEES: Primary | ICD-10-CM

## 2021-03-15 PROCEDURE — 99213 OFFICE O/P EST LOW 20 MIN: CPT | Performed by: NURSE PRACTITIONER

## 2021-03-15 ASSESSMENT — ENCOUNTER SYMPTOMS
VOMITING: 0
NAUSEA: 0
SHORTNESS OF BREATH: 0
DIARRHEA: 0
COUGH: 0

## 2021-03-15 NOTE — PATIENT INSTRUCTIONS

## 2021-03-15 NOTE — TELEPHONE ENCOUNTER
Reason for Disposition   Injury interferes with work or school    Answer Assessment - Initial Assessment Questions  1. MECHANISM: \"How did the injury happen? \" (e.g., twisting injury, direct blow)       Fell on to concrete 4 days ago    2. ONSET: \"When did the injury happen? \" (Minutes or hours ago)       4 days    3. LOCATION: \"Where is the injury located? \"       Bilateral knee    4. APPEARANCE of INJURY: \"What does the injury look like? \"       Edema when up and moving around, warm to touch on left     5. SEVERITY: \"Can you put weight on that leg? \" \"Can you walk? \"       Yes    6. SIZE: For cuts, bruises, or swelling, ask: \"How large is it? \" (e.g., inches or centimeters;  entire joint)       N/a    7. PAIN: \"Is there pain? \" If so, ask: \"How bad is the pain? \"    (e.g., Scale 1-10; or mild, moderate, severe)      2/10    8. TETANUS: For any breaks in the skin, ask: \"When was the last tetanus booster? \"     N/a    9. OTHER SYMPTOMS: \"Do you have any other symptoms? \"  (e.g., \"pop\" when knee injured, swelling, locking, buckling)       No    10. PREGNANCY: \"Is there any chance you are pregnant? \" \"When was your last menstrual period? \"        n/a    Protocols used: KNEE INJURY-ADULT-[place]    Patient called Demi at [facility])  with red flag complaint. Brief description of triage: fall and landed on bilateral knees 4 days ago and has edema and pain. Triage indicates for patient to see today or tomorrow in office     Care advice provided, patient verbalizes understanding; denies any other questions or concerns; instructed to call back for any new or worsening symptoms. Writer provided warm transfer to [facility] for appointment scheduling. Attention Provider: Thank you for allowing me to participate in the care of your patient. The patient was connected to triage in response to information provided to the [facility].   Please do not respond through this encounter as the response is not directed to a shared pool.

## 2021-03-15 NOTE — PROGRESS NOTES
Chanelle Rivera  : 1955  Encounter date: 3/15/2021    This is a 76 y.o. female who presents with  Chief Complaint   Patient presents with   Fatimah Palacios at [facility] on Friday. Fell onto knees. Been taking Motrin/ tylenol and using ice. History of present illness:    HPI   1. Presents to clinic today with concerns for bilateral knee swelling/discomfort. Per patient was walking out of [employer] and tripped on side walk and feel to her knees. Has been taking Ibuprofen and Tylenol with some short term relief. Has also been using ice/rest with some short term relief. Denies any bruising - slight abrasion. Reports has had knee scopes on each one, but otherwise no intervention. Denies any buckling/clicking. Yesterday was able to tolerate stairs, but discomfort today. 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 Allergies   Allergen Reactions    Darvocet [Propoxyphene N-Acetaminophen]     Lipitor [Atorvastatin]      Leg cramping    Diclofenac Rash     Current Outpatient Medications   Medication Sig Dispense Refill    ALPRAZolam (XANAX) 0.5 MG tablet Take 1 tablet by mouth nightly as needed for Sleep or Anxiety for up to 30 days. 30 tablet 0    furosemide (LASIX) 80 MG tablet TAKE ONE TABLET BY MOUTH EVERY DAY 90 tablet 1    Atorvastatin Calcium (LIPITOR PO) Take by mouth      Propoxyphene N-Acetaminophen (DARVOCET A500 PO) Take by mouth      diclofenac sodium (VOLTAREN) 1 % GEL Apply topically 2 times daily      gabapentin (NEURONTIN) 300 MG capsule Take 1 capsule by mouth 3 times daily for 30 days.  180 capsule 1    metoprolol succinate (TOPROL XL) 25 MG extended release tablet TAKE ONE TABLET BY MOUTH 2 TIMES A  tablet 0    ramipril (ALTACE) 10 MG capsule TAKE ONE CAPSULE BY MOUTH EVERY DAY 90 capsule 0    pravastatin (PRAVACHOL) 20 MG tablet TAKE 1 TABLET BY MOUTH EVERY EVENING 90 tablet 0    VITAMIN D PO Take by mouth      acetaminophen (TYLENOL) 500 MG tablet Take 1,000 mg by mouth 3 times daily      Multiple Vitamins-Minerals (THERAPEUTIC MULTIVITAMIN-MINERALS) tablet Take 1 tablet by mouth daily Centrum       No current facility-administered medications for this visit. Review of Systems   Constitutional: Negative for activity change, appetite change, chills, fatigue and fever. Respiratory: Negative for cough and shortness of breath. Cardiovascular: Negative for chest pain and palpitations. Gastrointestinal: Negative for diarrhea, nausea and vomiting. Musculoskeletal: Positive for arthralgias (bilateral knee pain). Past medical, surgical, family and social history were reviewed and updated with the patient. Objective:    /82 (Site: Left Upper Arm, Position: Sitting, Cuff Size: Medium Adult)   Pulse 69   Wt 154 lb 3.2 oz (69.9 kg)   SpO2 96%   BMI 22.76 kg/m²   Weight: 154 lb 3.2 oz (69.9 kg)     BP Readings from Last 3 Encounters:   03/15/21 134/82   01/26/21 136/84   01/07/21 (!) 144/86     Wt Readings from Last 3 Encounters:   03/15/21 154 lb 3.2 oz (69.9 kg)   01/26/21 155 lb (70.3 kg)   01/11/21 154 lb 1.6 oz (69.9 kg)     Physical Exam  Constitutional:       General: She is not in acute distress. Appearance: She is well-developed. HENT:      Head: Normocephalic and atraumatic. Cardiovascular:      Rate and Rhythm: Normal rate and regular rhythm. Heart sounds: Normal heart sounds, S1 normal and S2 normal.   Pulmonary:      Effort: Pulmonary effort is normal. No respiratory distress. Breath sounds: Normal breath sounds. Musculoskeletal:      Right knee: She exhibits swelling (mild) and ecchymosis. She exhibits normal range of motion, no deformity, no erythema and no bony tenderness. No medial joint line and no lateral joint line tenderness noted. Left knee: She exhibits swelling (mild). She exhibits normal range of motion, no ecchymosis, no deformity and no erythema.  No medial joint line and no lateral joint line tenderness noted. Skin:     General: Skin is warm and dry. Neurological:      Mental Status: She is alert and oriented to person, place, and time. Psychiatric:         Thought Content: Thought content normal.         Judgment: Judgment normal.       Assessment/Plan    1. Acute pain of both knees  Continue with conservation measures - RICE and can continue with PRN Tylenol/Ibuprofen. Monitor symptoms - if worsens follow up with office for further imaging and possible referral.  Provided with work note. Advised she should not return until discomfort/swelling has improved. Darwin Kathleen was counseled regarding symptoms of current diagnosis, course and complications of disease if inadequately treated. Discussed side effects of medications, diagnosis, treatment options, and prognosis along with risks, benefits, complications, and alternatives of treatment including labs, imaging and other studies/treatment targets and goals. She verbalized understanding of instructions and counseling. Return if symptoms worsen or fail to improve. Medical decision making of low complexity.

## 2021-03-25 RX ORDER — METOPROLOL SUCCINATE 25 MG/1
TABLET, EXTENDED RELEASE ORAL
Qty: 180 TABLET | Refills: 0 | Status: SHIPPED | OUTPATIENT
Start: 2021-03-25 | End: 2021-06-20

## 2021-03-25 RX ORDER — RAMIPRIL 10 MG/1
CAPSULE ORAL
Qty: 90 CAPSULE | Refills: 0 | Status: SHIPPED | OUTPATIENT
Start: 2021-03-25 | End: 2021-06-20

## 2021-03-29 ENCOUNTER — OFFICE VISIT (OUTPATIENT)
Dept: ORTHOPEDIC SURGERY | Age: 66
End: 2021-03-29
Payer: MEDICARE

## 2021-03-29 VITALS — HEIGHT: 69 IN | WEIGHT: 154 LBS | TEMPERATURE: 97.3 F | BODY MASS INDEX: 22.81 KG/M2

## 2021-03-29 DIAGNOSIS — M25.561 PAIN IN BOTH KNEES, UNSPECIFIED CHRONICITY: ICD-10-CM

## 2021-03-29 DIAGNOSIS — M17.0 BILATERAL PRIMARY OSTEOARTHRITIS OF KNEE: ICD-10-CM

## 2021-03-29 DIAGNOSIS — M25.561 RIGHT KNEE PAIN, UNSPECIFIED CHRONICITY: Primary | ICD-10-CM

## 2021-03-29 DIAGNOSIS — M17.2 BILATERAL POST-TRAUMATIC OSTEOARTHRITIS OF KNEE: ICD-10-CM

## 2021-03-29 DIAGNOSIS — M25.562 PAIN IN BOTH KNEES, UNSPECIFIED CHRONICITY: ICD-10-CM

## 2021-03-29 PROCEDURE — 20610 DRAIN/INJ JOINT/BURSA W/O US: CPT | Performed by: ORTHOPAEDIC SURGERY

## 2021-03-29 PROCEDURE — 99204 OFFICE O/P NEW MOD 45 MIN: CPT | Performed by: ORTHOPAEDIC SURGERY

## 2021-03-29 RX ORDER — METHYLPREDNISOLONE ACETATE 40 MG/ML
40 INJECTION, SUSPENSION INTRA-ARTICULAR; INTRALESIONAL; INTRAMUSCULAR; SOFT TISSUE ONCE
Status: COMPLETED | OUTPATIENT
Start: 2021-03-29 | End: 2021-03-29

## 2021-03-29 RX ORDER — BUPIVACAINE HYDROCHLORIDE 5 MG/ML
4 INJECTION, SOLUTION PERINEURAL ONCE
Status: COMPLETED | OUTPATIENT
Start: 2021-03-29 | End: 2021-03-29

## 2021-03-29 RX ADMIN — METHYLPREDNISOLONE ACETATE 40 MG: 40 INJECTION, SUSPENSION INTRA-ARTICULAR; INTRALESIONAL; INTRAMUSCULAR; SOFT TISSUE at 15:33

## 2021-03-29 RX ADMIN — BUPIVACAINE HYDROCHLORIDE 20 MG: 5 INJECTION, SOLUTION PERINEURAL at 15:32

## 2021-03-29 RX ADMIN — METHYLPREDNISOLONE ACETATE 40 MG: 40 INJECTION, SUSPENSION INTRA-ARTICULAR; INTRALESIONAL; INTRAMUSCULAR; SOFT TISSUE at 15:34

## 2021-03-29 RX ADMIN — BUPIVACAINE HYDROCHLORIDE 20 MG: 5 INJECTION, SOLUTION PERINEURAL at 15:33

## 2021-03-29 NOTE — PROGRESS NOTES
3/29/2021     History of Present Illness: The patient is a 18-year-old female who presents for evaluation of her bilateral knees. She reports falling on 3 12,021. Following this she started developed diffuse pain within the knees. She localizes the pain to the lateral aspect of the knees. The pain is made worse with standing, walking, stairs. She does report occasional swelling. No catching or locking. She has had no formal treatment.     Medical History:  Past Medical History:   Diagnosis Date    Anxiety     Fibromyalgia     Gout     Hyperlipidemia     Hypertension     Osteoarthritis       Past Surgical History:   Procedure Laterality Date    APPENDECTOMY  02/13/2019    [facility]AC JOINT Right 8/31/2020    RIGHT SACROILIAC JOINT INJECTION AND RIGHT GREATER TROCHANTERIC BURSA INJECTION WITH FLUOROSCOPY (31918, [id number]) performed by [facility]ias [facility]rum [facility] at [facility]    KNEE ARTHROSCOPY  2009    RT    PAIN MANAGEMENT PROCEDURE Right 2/12/2020    RIGHT L4 AND L5 TRANSFORAMINAL EPIDURAL STEROID INJECTION WITH FLUOROSCOPY (87086, [id number]) performed by Josias Crum MD at [address] [place] [address] Right 12/2/2020    RIGHT L4 AND L5 TRANSFORAMINAL EPIDURAL STEROID INJECTION WITH FLUOROSCOPY performed by Josias Crum MD at [place] [address] History   Problem Relation Age of Onset    Diabetes Mother       Social History     Socioeconomic History    Marital status:      Spouse name: Not on file    Number of children: Not on file    Years of education: Not on file    Highest education level: Not on file   Occupational History    Occupation: LPN     Comment: Full Time   Social Needs    Financial resource strain: Not on file    Food insecurity     Worry: Not on file     Inability: Not on file    Transportation needs     Medical: Not on file     Non-medical: Not on file   Tobacco Use    Smoking status: Current Every Day Smoker     Packs/day: 1.00     Types: Cigarettes    Smokeless tobacco: Never Used   Substance and Sexual Activity    Alcohol use: No     Frequency: Never    Drug use: No    Sexual activity: Not Currently   Lifestyle    Physical activity     Days per week: Not on file     Minutes per session: Not on file    Stress: Not on file   Relationships    Social connections     Talks on phone: Not on file     Gets together: Not on file     Attends Latter day service: Not on file     Active member of club or organization: Not on file     Attends meetings of clubs or organizations: Not on file     Relationship status: Not on file    Intimate partner violence     Fear of current or ex partner: Not on file     Emotionally abused: Not on file     Physically abused: Not on file     Forced sexual activity: Not on file   Other Topics Concern    Not on file   Social History Narrative    Not on file      Current Outpatient Medications on File Prior to Visit   Medication Sig Dispense Refill    metoprolol succinate (TOPROL XL) 25 MG extended release tablet TAKE 1 TABLET BY MOUTH TWICE DAILY 180 tablet 0    ramipril (ALTACE) 10 MG capsule TAKE 1 CAPSULE BY MOUTH EVERY DAY 90 capsule 0    ALPRAZolam (XANAX) 0.5 MG tablet Take 1 tablet by mouth nightly as needed for Sleep or Anxiety for up to 30 days. 30 tablet 0    furosemide (LASIX) 80 MG tablet TAKE ONE TABLET BY MOUTH EVERY DAY 90 tablet 1    Atorvastatin Calcium (LIPITOR PO) Take by mouth      Propoxyphene N-Acetaminophen (DARVOCET A500 PO) Take by mouth      diclofenac sodium (VOLTAREN) 1 % GEL Apply topically 2 times daily      gabapentin (NEURONTIN) 300 MG capsule Take 1 capsule by mouth 3 times daily for 30 days.  180 capsule 1    pravastatin (PRAVACHOL) 20 MG tablet TAKE 1 TABLET BY MOUTH EVERY EVENING 90 tablet 0    VITAMIN D PO Take by mouth      acetaminophen (TYLENOL) 500 MG tablet Take 1,000 mg by mouth 3 times daily      Multiple Vitamins-Minerals (THERAPEUTIC MULTIVITAMIN-MINERALS) tablet Take 1 tablet by mouth daily Centrum       No current facility-administered medications on file prior to visit. Allergies   Allergen Reactions    Darvocet [Propoxyphene N-Acetaminophen]     Lipitor [Atorvastatin]      Leg cramping    Diclofenac Rash        Review of Systems:  Constitutional: Patient is adequately groomed with no evidence of malnutrition  Mental Status: The patient is oriented to time, place and person. The patient's mood and affect are appropriate. Lymphatic: The lymphatic examination bilaterally reveals all areas to be without enlargement or induration. Vascular: Examination reveals no swelling or calf tenderness. Peripheral pulses are palpable and 2+. Neurological: The patient has good coordination. There is no weakness or sensory deficit. Skin:  Head/Neck: inspection reveals no rashes, ulcerations or lesions. Trunk: inspection reveals no rashes, ulcerations or lesions. Objective:  Temp 97.3 °F (36.3 °C)   Ht 5' 9\" (1.753 m)   Wt 154 lb (69.9 kg)   BMI 22.74 kg/m²      Physical Exam:  The patient is well-appearing in no apparent distress  Bilateral lower extremities-small effusions of the knees, range of motion is 0 to 130 degrees, no ligamentous instability, lateral joint line tenderness is present, 5 out of 5 distal muscle strength, sensation is intact to light touch throughout all distributions, palpable DP pulse, 2+ symmetric reflexes    Imagin view x-rays of the right and left knees obtained in the office today on 3/29/2021 were reviewed. There is no fracture or dislocation. Tricompartmental degenerative changes of both knees which are most pronounced within the lateral compartments where there is joint space loss and osteophyte formation. Assessment:  Bilateral knee osteoarthritis    Plan:  I discussed with the patient the diagnosis and treatment options.   At this point I do recommend nonoperative management. Nonoperative treatment options include activity modification, anti-inflammatory medications, physical therapy, and injections. She wishes to proceed with cortisone injection to both knees. This was given via sterile technique with 1 mL of 40 mg Depo-Medrol combined with 4 mL of 0.5% Marcaine. She will return as needed. In the future we could always repeat cortisone or proceed with hyaluronic acid. Alternatively she may be a candidate for total knee arthroplasty. Electronically signed by Romero Duran MD on 3/29/21 at 3:32 PM EDT     Disclaimer: This note was dictated with voice recognition software. Though review and correction are routine, we apologize for any errors.

## 2021-04-02 ENCOUNTER — HOSPITAL ENCOUNTER (OUTPATIENT)
Age: 66
Discharge: HOME | End: 2021-04-02
Payer: MEDICARE

## 2021-04-02 DIAGNOSIS — I10 ESSENTIAL HYPERTENSION: ICD-10-CM

## 2021-04-02 DIAGNOSIS — F41.9 ANXIETY: ICD-10-CM

## 2021-04-02 DIAGNOSIS — E78.2 MIXED HYPERLIPIDEMIA: ICD-10-CM

## 2021-04-02 DIAGNOSIS — G47.09 OTHER INSOMNIA: ICD-10-CM

## 2021-04-02 LAB
A/G RATIO: 1.9 (ref 1.1–2.2)
ALBUMIN SERPL-MCNC: 4.2 G/DL (ref 3.4–5)
ALP BLD-CCNC: 44 U/L (ref 40–129)
ALT SERPL-CCNC: 9 U/L (ref 10–40)
ANION GAP SERPL CALCULATED.3IONS-SCNC: 7 MMOL/L (ref 3–16)
AST SERPL-CCNC: 14 U/L (ref 15–37)
BASOPHILS ABSOLUTE: 0 K/UL (ref 0–0.2)
BASOPHILS RELATIVE PERCENT: 0.5 %
BILIRUB SERPL-MCNC: 0.4 MG/DL (ref 0–1)
BUN BLDV-MCNC: 16 MG/DL (ref 7–20)
CALCIUM SERPL-MCNC: 9.3 MG/DL (ref 8.3–10.6)
CHLORIDE BLD-SCNC: 107 MMOL/L (ref 99–110)
CHOLESTEROL, FASTING: 177 MG/DL (ref 0–199)
CO2: 30 MMOL/L (ref 21–32)
CREAT SERPL-MCNC: 0.5 MG/DL (ref 0.6–1.2)
EOSINOPHILS ABSOLUTE: 0.2 K/UL (ref 0–0.6)
EOSINOPHILS RELATIVE PERCENT: 2.5 %
GFR AFRICAN AMERICAN: >60
GFR NON-AFRICAN AMERICAN: >60
GLOBULIN: 2.2 G/DL
GLUCOSE FASTING: 92 MG/DL (ref 70–99)
HCT VFR BLD CALC: 44.6 % (ref 36–48)
HDLC SERPL-MCNC: 63 MG/DL (ref 40–60)
HEMOGLOBIN: 15.1 G/DL (ref 12–16)
LDL CHOLESTEROL CALCULATED: 95 MG/DL
LYMPHOCYTES ABSOLUTE: 2.1 K/UL (ref 1–5.1)
LYMPHOCYTES RELATIVE PERCENT: 34.2 %
MCH RBC QN AUTO: 32.1 PG (ref 26–34)
MCHC RBC AUTO-ENTMCNC: 33.9 G/DL (ref 31–36)
MCV RBC AUTO: 94.6 FL (ref 80–100)
MONOCYTES ABSOLUTE: 0.5 K/UL (ref 0–1.3)
MONOCYTES RELATIVE PERCENT: 8.1 %
NEUTROPHILS ABSOLUTE: 3.3 K/UL (ref 1.7–7.7)
NEUTROPHILS RELATIVE PERCENT: 54.7 %
PDW BLD-RTO: 13.9 % (ref 12.4–15.4)
PLATELET # BLD: 188 K/UL (ref 135–450)
PMV BLD AUTO: 8.6 FL (ref 5–10.5)
POTASSIUM SERPL-SCNC: 4.8 MMOL/L (ref 3.5–5.1)
RBC # BLD: 4.72 M/UL (ref 4–5.2)
SODIUM BLD-SCNC: 144 MMOL/L (ref 136–145)
TOTAL PROTEIN: 6.4 G/DL (ref 6.4–8.2)
TRIGLYCERIDE, FASTING: 97 MG/DL (ref 0–150)
VLDLC SERPL CALC-MCNC: 19 MG/DL
WBC # BLD: 6.1 K/UL (ref 4–11)

## 2021-04-02 PROCEDURE — 80053 COMPREHEN METABOLIC PANEL: CPT

## 2021-04-02 PROCEDURE — 80061 LIPID PANEL: CPT

## 2021-04-02 PROCEDURE — 36415 COLL VENOUS BLD VENIPUNCTURE: CPT

## 2021-04-02 PROCEDURE — 85025 COMPLETE CBC W/AUTO DIFF WBC: CPT

## 2021-04-02 RX ORDER — ALPRAZOLAM 0.5 MG/1
0.5 TABLET ORAL NIGHTLY PRN
Qty: 30 TABLET | Refills: 0 | Status: SHIPPED | OUTPATIENT
Start: 2021-04-02 | End: 2021-04-29 | Stop reason: SDUPTHER

## 2021-04-02 NOTE — TELEPHONE ENCOUNTER
PT is requesting refill on her ALPRAZolam Kitty Sheikh) 0.5 MG tablet to please be called into [facility] [address] [place] [address] Glenny Heller [facility]

## 2021-04-02 NOTE — TELEPHONE ENCOUNTER
Medication:   Requested Prescriptions     Pending Prescriptions Disp Refills    ALPRAZolam (XANAX) 0.5 MG tablet 30 tablet 0     Sig: Take 1 tablet by mouth nightly as needed for Sleep or Anxiety for up to 30 days. Last Filled:  3/1/21    Patient Phone Number: [phone] (home)     Last appt: 3/15/2021   Next appt: 4/7/2021    Last OARRS:   RX Monitoring 4/24/2019   Attestation The Prescription Monitoring Report for this patient was reviewed today. Periodic Controlled Substance Monitoring -   Chronic Pain > 50 MEDD Obtained or confirmened \"Consent of Opioid Use\" on file.      PDMP Monitoring:    Last PDMP Chilo Leigh as Reviewed [facility]):  Review User Review Instant Review Result   Mika Colon 1/7/2021  9:50 AM Reviewed PDMP [1]     Preferred Pharmacy:   [facility] Miller [place], [place] Madeline Jamison [address] [place] [id number]  Phone: [phone] Fax: [phone]

## 2021-04-07 ENCOUNTER — OFFICE VISIT (OUTPATIENT)
Dept: FAMILY MEDICINE CLINIC | Age: 66
End: 2021-04-07
Payer: MEDICARE

## 2021-04-07 VITALS
OXYGEN SATURATION: 98 % | HEART RATE: 65 BPM | DIASTOLIC BLOOD PRESSURE: 70 MMHG | TEMPERATURE: 97.5 F | WEIGHT: 151.6 LBS | BODY MASS INDEX: 22.39 KG/M2 | SYSTOLIC BLOOD PRESSURE: 140 MMHG

## 2021-04-07 DIAGNOSIS — E78.2 MIXED HYPERLIPIDEMIA: Primary | ICD-10-CM

## 2021-04-07 DIAGNOSIS — F41.9 ANXIETY: ICD-10-CM

## 2021-04-07 DIAGNOSIS — I10 ESSENTIAL HYPERTENSION: ICD-10-CM

## 2021-04-07 PROCEDURE — 99214 OFFICE O/P EST MOD 30 MIN: CPT | Performed by: NURSE PRACTITIONER

## 2021-04-07 ASSESSMENT — ENCOUNTER SYMPTOMS
VOMITING: 0
CHEST TIGHTNESS: 0
NAUSEA: 0
CONSTIPATION: 0
DIARRHEA: 0
BACK PAIN: 1
SHORTNESS OF BREATH: 0

## 2021-04-07 NOTE — PROGRESS NOTES
Edna Boyle  : 1955  Encounter date: 2021    This dominick 76 y.o. female who presents with  Chief Complaint   Patient presents with    Hypertension       History of present illness:    HPI Pt is 76year old female for medication recheck on xanax for anxiety, well controlled, essential hypertension, mixed hyperlipidemia. Recent labs showed well controlled cholesterol, no evidence anemia or diabetes. Pt is due for AWV, will schedule for next 3 months. PT is being seen by rheumatology for fibromyalgia and orthopedics for acute lumbar pain and bilateral knee pain. Recently received bilateral cortisone injections, candidate for hyaluronic acid. Current Outpatient Medications on File Prior to Visit   Medication Sig Dispense Refill    ALPRAZolam (XANAX) 0.5 MG tablet Take 1 tablet by mouth nightly as needed for Sleep or Anxiety for up to 30 days. 30 tablet 0    metoprolol succinate (TOPROL XL) 25 MG extended release tablet TAKE 1 TABLET BY MOUTH TWICE DAILY 180 tablet 0    ramipril (ALTACE) 10 MG capsule TAKE 1 CAPSULE BY MOUTH EVERY DAY 90 capsule 0    furosemide (LASIX) 80 MG tablet TAKE ONE TABLET BY MOUTH EVERY DAY 90 tablet 1    gabapentin (NEURONTIN) 300 MG capsule Take 1 capsule by mouth 3 times daily for 30 days. 180 capsule 1    pravastatin (PRAVACHOL) 20 MG tablet TAKE 1 TABLET BY MOUTH EVERY EVENING 90 tablet 0    VITAMIN D PO Take by mouth      acetaminophen (TYLENOL) 500 MG tablet Take 1,000 mg by mouth 3 times daily      Multiple Vitamins-Minerals (THERAPEUTIC MULTIVITAMIN-MINERALS) tablet Take 1 tablet by mouth daily Centrum       No current facility-administered medications on file prior to visit.        Allergies   Allergen Reactions    Darvocet [Propoxyphene N-Acetaminophen]     Lipitor [Atorvastatin]      Leg cramping    Diclofenac Rash     Past Medical History:   Diagnosis Date    Anxiety     Fibromyalgia     Gout     Hyperlipidemia     Hypertension     Osteoarthritis Past Surgical History:   Procedure Laterality Date    APPENDECTOMY  02/13/2019    [facility]. INJECTION PROCEDURE FOR SACROILIAC JOINT Right 8/31/2020    RIGHT SACROILIAC JOINT INJECTION AND RIGHT GREATER TROCHANTERIC BURSA INJECTION WITH FLUOROSCOPY (18609, 52901) performed by Astrid Valdovinos MD at [facility]    KNEE ARTHROSCOPY  2009    RT    PAIN MANAGEMENT PROCEDURE Right 2/12/2020    RIGHT L4 AND L5 TRANSFORAMINAL EPIDURAL STEROID INJECTION WITH FLUOROSCOPY ([id number], 33058) performed by Astrid Valdovinos MD at [address] [place] [address] Right 12/2/2020    RIGHT L4 AND L5 TRANSFORAMINAL EPIDURAL STEROID INJECTION WITH FLUOROSCOPY performed by Astrid Valdovinos MD at [place] [address] History   Problem Relation Age of Onset    Diabetes Mother       Social History     Tobacco Use    Smoking status: Current Every Day Smoker     Packs/day: 1.00     Types: Cigarettes    Smokeless tobacco: Never Used   Substance Use Topics    Alcohol use: No     Frequency: Never        Review of Systems   Constitutional: Negative for activity change, appetite change, fatigue and unexpected weight change. Eyes: Negative for visual disturbance. Respiratory: Negative for chest tightness and shortness of breath. Cardiovascular: Negative for chest pain, palpitations and leg swelling. Gastrointestinal: Negative for constipation, diarrhea, nausea and vomiting. Musculoskeletal: Positive for arthralgias (bilateral knees), back pain (chronic lumbar), gait problem and joint swelling. Hematological: Does not bruise/bleed easily. Psychiatric/Behavioral: Negative for dysphoric mood and sleep disturbance. The patient is not nervous/anxious.         Objective:    BP (!) 140/70 (Site: Left Upper Arm, Position: Sitting, Cuff Size: Medium Adult)   Pulse 65   Temp 97.5 °F (36.4 °C) (Infrared)   Wt 151 lb 9.6 oz (68.8 kg)   SpO2 98%   BMI 22.39 kg/m²   Weight: 151 lb 9.6 oz (68.8 kg)     BP Readings from Last 3 Encounters:   04/07/21 (!) 140/70   03/15/21 134/82   01/26/21 136/84     Wt Readings from Last 3 Encounters:   04/07/21 151 lb 9.6 oz (68.8 kg)   03/29/21 154 lb (69.9 kg)   03/15/21 154 lb 3.2 oz (69.9 kg)     BMI Readings from Last 3 Encounters:   04/07/21 22.39 kg/m²   03/29/21 22.74 kg/m²   03/15/21 22.76 kg/m²       Physical Exam  Vitals signs reviewed. Constitutional:       Appearance: Normal appearance. She is well-developed. Cardiovascular:      Rate and Rhythm: Normal rate and regular rhythm. Heart sounds: Normal heart sounds. No murmur. Pulmonary:      Effort: Pulmonary effort is normal.      Breath sounds: Normal breath sounds. Musculoskeletal:         General: Tenderness (bilateral lateral knees) present. Right lower leg: No edema. Left lower leg: No edema. Skin:     General: Skin is warm and dry. Neurological:      Mental Status: She is alert and oriented to person, place, and time. Psychiatric:         Mood and Affect: Mood normal.         Assessment/Plan    1. Mixed hyperlipidemia  Reviewed labs  Continue pravastatin 20 mg    2. Essential hypertension  Routine monitoring, keep BP <140/90  Continue altace 10 mg  Continue Toprol 25 mg     3. Anxiety  OARRS reviewed  Continue xanax 0.5 mg      Return in about 3 months (around 7/7/2021) for medication re-check. This dictation was generated by voice recognition computer software. Although all attempts are made to edit the dictation for accuracy, there may be errors in the transcription that are not intended.

## 2021-04-08 DIAGNOSIS — I10 HYPERTENSION, UNSPECIFIED TYPE: ICD-10-CM

## 2021-04-08 RX ORDER — PRAVASTATIN SODIUM 20 MG
20 TABLET ORAL EVERY EVENING
Qty: 90 TABLET | Refills: 0 | Status: SHIPPED | OUTPATIENT
Start: 2021-04-08 | End: 2021-07-07

## 2021-04-08 NOTE — TELEPHONE ENCOUNTER
Medication:   Requested Prescriptions     Pending Prescriptions Disp Refills    pravastatin (PRAVACHOL) 20 MG tablet [Pharmacy Med Name: PRAVASTATIN 20MG TABLETS] 90 tablet 0     Sig: TAKE 1 TABLET BY MOUTH EVERY EVENING          Patient Phone Number: [phone] (home)     Last appt: 4/7/2021   Next appt: 7/7/2021    Last OARRS:   RX Monitoring 4/24/2019   Attestation The Prescription Monitoring Report for this patient was reviewed today. Periodic Controlled Substance Monitoring -   Chronic Pain > 50 MEDD Obtained or confirmened \"Consent of Opioid Use\" on file.      PDMP Monitoring:    Last PDMP Raymundo Horton as Reviewed [facility]):  Review User Review Instant Review Result   [facility] 4/7/2021  8:44 AM Reviewed PDMP [1]     [facility] Pharm[facility]   [facility]  [place] [address]  Phone: [phone] Fax: [phone]

## 2021-04-09 ENCOUNTER — TELEPHONE (OUTPATIENT)
Dept: ORTHOPEDIC SURGERY | Age: 66
End: 2021-04-09

## 2021-04-09 DIAGNOSIS — M17.0 BILATERAL PRIMARY OSTEOARTHRITIS OF KNEE: Primary | ICD-10-CM

## 2021-04-14 ENCOUNTER — TELEPHONE (OUTPATIENT)
Dept: ORTHOPEDIC SURGERY | Age: 66
End: 2021-04-14

## 2021-04-14 NOTE — TELEPHONE ENCOUNTER
04/14/2021  GEL-ONE    BILATERAL KNEES. Conner Siegel #  [id number]. DATES:  04/12/2021 - 07/12/2021. PER FAX FROM [employer]. DARLIN    NOTE: Original request was for non-preferred drug DUROLANE. Okay to switch to preferred drug GEL-ONE, Per KAREN.  DARLIN

## 2021-04-15 ENCOUNTER — TELEPHONE (OUTPATIENT)
Dept: ORTHOPEDIC SURGERY | Age: 66
End: 2021-04-15

## 2021-04-29 DIAGNOSIS — G47.09 OTHER INSOMNIA: ICD-10-CM

## 2021-04-29 DIAGNOSIS — F41.9 ANXIETY: ICD-10-CM

## 2021-04-29 RX ORDER — ALPRAZOLAM 0.5 MG/1
0.5 TABLET ORAL NIGHTLY PRN
Qty: 30 TABLET | Refills: 0 | Status: SHIPPED | OUTPATIENT
Start: 2021-04-29 | End: 2021-06-01

## 2021-05-03 ENCOUNTER — OFFICE VISIT (OUTPATIENT)
Dept: ORTHOPEDIC SURGERY | Age: 66
End: 2021-05-03
Payer: MEDICARE

## 2021-05-03 VITALS — BODY MASS INDEX: 22.36 KG/M2 | HEIGHT: 69 IN | WEIGHT: 151 LBS

## 2021-05-03 DIAGNOSIS — M17.0 BILATERAL PRIMARY OSTEOARTHRITIS OF KNEE: Primary | ICD-10-CM

## 2021-05-03 DIAGNOSIS — M13.0 POLYARTHROPATHY, MULTIPLE SITES: ICD-10-CM

## 2021-05-03 PROCEDURE — 99213 OFFICE O/P EST LOW 20 MIN: CPT | Performed by: ORTHOPAEDIC SURGERY

## 2021-05-03 PROCEDURE — 20610 DRAIN/INJ JOINT/BURSA W/O US: CPT | Performed by: ORTHOPAEDIC SURGERY

## 2021-05-03 RX ORDER — GABAPENTIN 300 MG/1
CAPSULE ORAL
Qty: 180 CAPSULE | Refills: 1 | Status: SHIPPED | OUTPATIENT
Start: 2021-05-03 | End: 2021-08-30

## 2021-05-03 NOTE — PROGRESS NOTES
5/3/2021     History of Present Illness: The patient is a 60-year-old female who presents for evaluation of her bilateral knees. She reports falling on 3 12,021. Following this she started developed diffuse pain within the knees. She localizes the pain to the lateral aspect of the knees. The pain is made worse with standing, walking, stairs. She does report occasional swelling. No catching or locking. She has had no formal treatment. At the last visit we proceeded with cortisone injections to the knees. The did provide some relief however it was short-lived and now she has pain once again. Objective:  Ht 5' 9\" (1.753 m)   Wt 151 lb (68.5 kg)   BMI 22.30 kg/m²      Physical Exam:  The patient is well-appearing in no apparent distress  Bilateral lower extremities-small effusions of the knees, range of motion is 0 to 130 degrees, no ligamentous instability, lateral joint line tenderness is present, 5 out of 5 distal muscle strength, sensation is intact to light touch throughout all distributions, palpable DP pulse, 2+ symmetric reflexes    Imagin view x-rays of the right and left knees obtained in the office today on 3/29/2021 were reviewed. There is no fracture or dislocation. Tricompartmental degenerative changes of both knees which are most pronounced within the lateral compartments where there is joint space loss and osteophyte formation. Assessment:  Bilateral knee osteoarthritis    Plan:  I discussed with the patient the diagnosis and treatment options. At this point I do recommend nonoperative management. Nonoperative treatment options include activity modification, anti-inflammatory medications, physical therapy, and injections. She wishes to proceed with hyaluronic acid injection to both knees. This was given via sterile technique. She will return as needed. In the future we could always repeat cortisone or proceed with hyaluronic acid.   Alternatively she may be a candidate for total knee arthroplasty. Disclaimer: This note was dictated with voice recognition software. Though review and correction are routine, we apologize for any errors.

## 2021-05-03 NOTE — TELEPHONE ENCOUNTER
Medication:   Requested Prescriptions     Pending Prescriptions Disp Refills    gabapentin (NEURONTIN) 300 MG capsule [Pharmacy Med Name: GABAPENTIN 300MG CAPSULES] 180 capsule 1     Sig: TAKE 1 CAPSULE BY MOUTH THREE TIMES DAILY      Last Filled:      Patient Phone Number: [phone] (home)     Last appt: 4/7/2021   Next appt: 7/7/2021    Last OARRS:   RX Monitoring 4/24/2019   Attestation The Prescription Monitoring Report for this patient was reviewed today. Periodic Controlled Substance Monitoring -   Chronic Pain > 50 MEDD Obtained or confirmened \"Consent of Opioid Use\" on file.      PDMP Monitoring:    Last PDMP Stefanie Ortiz as Reviewed [facility]):  Review User Review Instant Review Result   Kofi Monae 4/7/2021  8:44 AM Reviewed PDMP [1]     [facility]   [facility]  [place] [address]  Phone: [phone] Fax: [phone]

## 2021-05-28 ENCOUNTER — TELEPHONE (OUTPATIENT)
Dept: FAMILY MEDICINE CLINIC | Age: 66
End: 2021-05-28

## 2021-05-28 DIAGNOSIS — G47.09 OTHER INSOMNIA: ICD-10-CM

## 2021-05-28 DIAGNOSIS — F41.9 ANXIETY: ICD-10-CM

## 2021-05-28 NOTE — TELEPHONE ENCOUNTER
Disp Refills Start End    ALPRAZolam (XANAX) 0.5 MG tablet 30 tablet 0 4/29/2021 5/29/2021    Sig - Route:  Take 1 tablet by mouth nightly as needed for Sleep or Anxiety for up to 30 days. - Oral      [facility] [address] - F [phone]       Provider out of office      Please advise

## 2021-05-28 NOTE — TELEPHONE ENCOUNTER
Medication:   Requested Prescriptions     Pending Prescriptions Disp Refills    ALPRAZolam (XANAX) 0.5 MG tablet [Pharmacy Med Name: ALPRAZOLAM 0.5MG TABLETS] 30 tablet      Sig: TAKE 1 TABLET BY MOUTH EVERY NIGHT AS NEEDED FOR SLEEP OR ANXIETY      Last Filled:  4/29/2021    Patient Phone Number: [phone] (home)     Last appt: 4/7/2021   Next appt: 7/7/2021    Last OARRS:   RX Monitoring 4/24/2019   Attestation The Prescription Monitoring Report for this patient was reviewed today. Periodic Controlled Substance Monitoring -   Chronic Pain > 50 MEDD Obtained or confirmened \"Consent of Opioid Use\" on file.      PDMP Monitoring:    Last PDMP Uma Jarrett as Reviewed [facility]):  Review User Review Instant Review Result   Johnson Lockett 4/7/2021  8:44 AM Reviewed PDMP [1]     Preferred Pharmacy:   [facility] [address]   [place] [address]  Phone: [phone] Fax: [phone]

## 2021-06-01 RX ORDER — ALPRAZOLAM 0.5 MG/1
TABLET ORAL
Qty: 30 TABLET | Refills: 0 | Status: SHIPPED | OUTPATIENT
Start: 2021-06-01 | End: 2021-07-01 | Stop reason: SDUPTHER

## 2021-06-17 ENCOUNTER — OFFICE VISIT (OUTPATIENT)
Dept: ORTHOPEDIC SURGERY | Age: 66
End: 2021-06-17
Payer: MEDICARE

## 2021-06-17 VITALS — WEIGHT: 151 LBS | BODY MASS INDEX: 22.36 KG/M2 | HEIGHT: 69 IN

## 2021-06-17 DIAGNOSIS — M17.0 BILATERAL PRIMARY OSTEOARTHRITIS OF KNEE: Primary | ICD-10-CM

## 2021-06-17 PROCEDURE — 99213 OFFICE O/P EST LOW 20 MIN: CPT | Performed by: ORTHOPAEDIC SURGERY

## 2021-06-18 NOTE — TELEPHONE ENCOUNTER
Medication:   Requested Prescriptions     Pending Prescriptions Disp Refills    metoprolol succinate (TOPROL XL) 25 MG extended release tablet [Pharmacy Med Name: METOPROLOL ER SUCCINATE 25MG TABS] 180 tablet 0     Sig: TAKE 1 TABLET BY MOUTH TWICE DAILY    ramipril (ALTACE) 10 MG capsule [Pharmacy Med Name: RAMIPRIL 10MG CAPSULES] 90 capsule 0     Sig: TAKE 1 CAPSULE BY MOUTH EVERY DAY      Last Filled:      Patient Phone Number: [phone] (home)     Last appt: 4/7/2021   Next appt: 7/7/2021    Last OARRS:   RX Monitoring 4/24/2019   Attestation The Prescription Monitoring Report for this patient was reviewed today. Periodic Controlled Substance Monitoring -   Chronic Pain > 50 MEDD Obtained or confirmened \"Consent of Opioid Use\" on file.      PDMP Monitoring:    Last PDMP Ligia Yo as Reviewed [facility]):  Review User Review Instant Review Result   Yessica Bales 4/7/2021  8:44 AM Reviewed PDMP [1]     Preferred Pharmacy:   [address]  [facility] [address]  Phone: [phone] Fax: [phone]

## 2021-06-20 RX ORDER — RAMIPRIL 10 MG/1
CAPSULE ORAL
Qty: 90 CAPSULE | Refills: 0 | Status: SHIPPED | OUTPATIENT
Start: 2021-06-20 | End: 2021-09-16

## 2021-06-20 RX ORDER — METOPROLOL SUCCINATE 25 MG/1
TABLET, EXTENDED RELEASE ORAL
Qty: 180 TABLET | Refills: 0 | Status: SHIPPED | OUTPATIENT
Start: 2021-06-20 | End: 2021-09-16

## 2021-06-21 NOTE — PROGRESS NOTES
2021     History of Present Illness: The patient is a 68-year-old female who presents for evaluation of her bilateral knees. She reports falling on 3 12,021. Following this she started developed diffuse pain within the knees. She localizes the pain to the lateral aspect of the knees. The pain is made worse with standing, walking, stairs. She does report occasional swelling. No catching or locking. She has had no formal treatment. The patient has been treated with a cortisone injection followed by hyaluronic acid injection. Neither injection really provided her with lasting relief. She now continues to have pain on a daily basis. She has difficulty with daily activities. Objective:  Ht 5' 9\" (1.753 m)   Wt 151 lb (68.5 kg)   BMI 22.30 kg/m²      Physical Exam:  The patient is well-appearing in no apparent distress  Bilateral lower extremities-small effusions of the knees, range of motion is 0 to 130 degrees, no ligamentous instability, lateral joint line tenderness is present, 5 out of 5 distal muscle strength, sensation is intact to light touch throughout all distributions, palpable DP pulse, 2+ symmetric reflexes    Imagin view x-rays of the right and left knees obtained in the office today on 3/29/2021 were reviewed. There is no fracture or dislocation. Tricompartmental degenerative changes of both knees which are most pronounced within the lateral compartments where there is joint space loss and osteophyte formation. Assessment:  Bilateral knee osteoarthritis    Plan:  Unfortunately the patient remains symptomatic despite conservative measures. She has pain and dysfunction that interferes with her quality of life. At this point I would send her to my partner Dr. Juwan Stokes to determine whether or not she may be a candidate for total knee arthroplasty. She is in agreement with the plan. Disclaimer: This note was dictated with voice recognition software.   Though review

## 2021-07-01 DIAGNOSIS — G47.09 OTHER INSOMNIA: ICD-10-CM

## 2021-07-01 DIAGNOSIS — F41.9 ANXIETY: ICD-10-CM

## 2021-07-01 RX ORDER — ALPRAZOLAM 0.5 MG/1
TABLET ORAL
Qty: 30 TABLET | Refills: 0 | Status: SHIPPED | OUTPATIENT
Start: 2021-07-01 | End: 2021-08-04 | Stop reason: SDUPTHER

## 2021-07-01 NOTE — TELEPHONE ENCOUNTER
Medication:   Pending Prescriptions Disp Refills    ALPRAZolam (XANAX) 0.5 MG tablet 30 tablet 0      Last Filled:  6/1/21    Patient Phone Number: [phone] (home)     Last appt: 4/7/2021   Next appt: 7/7/2021    Last OARRS:   RX Monitoring 4/24/2019   Attestation The Prescription Monitoring Report for this patient was reviewed today. Periodic Controlled Substance Monitoring -   Chronic Pain > 50 MEDD Obtained or confirmened \"Consent of Opioid Use\" on file.             No opioid med agreement  on file        Last PDMP Kevin Kuhn as Reviewed [facility]):  Review User Review Instant Review Result   Saima Garvey 4/7/2021  8:44 AM Reviewed PDMP [1]     Preferred Pharmacy:   [facility] [place] [address] [place]

## 2021-07-01 NOTE — TELEPHONE ENCOUNTER
[facility], [address] [address], [facility] [place]   Phone:  [phone]  Fax:  [phone]      ALPRAZolam Evaline Beams) 0.5 MG tablet [[id number]

## 2021-07-07 ENCOUNTER — OFFICE VISIT (OUTPATIENT)
Dept: FAMILY MEDICINE CLINIC | Age: 66
End: 2021-07-07
Payer: MEDICARE

## 2021-07-07 VITALS
SYSTOLIC BLOOD PRESSURE: 142 MMHG | OXYGEN SATURATION: 97 % | TEMPERATURE: 98.2 F | BODY MASS INDEX: 22.51 KG/M2 | HEART RATE: 71 BPM | HEIGHT: 69 IN | WEIGHT: 152 LBS | DIASTOLIC BLOOD PRESSURE: 86 MMHG

## 2021-07-07 DIAGNOSIS — G47.09 OTHER INSOMNIA: ICD-10-CM

## 2021-07-07 DIAGNOSIS — I10 HYPERTENSION, UNSPECIFIED TYPE: ICD-10-CM

## 2021-07-07 DIAGNOSIS — F41.9 ANXIETY: Primary | ICD-10-CM

## 2021-07-07 DIAGNOSIS — M79.7 FIBROMYALGIA: ICD-10-CM

## 2021-07-07 PROCEDURE — 99214 OFFICE O/P EST MOD 30 MIN: CPT | Performed by: NURSE PRACTITIONER

## 2021-07-07 RX ORDER — PRAVASTATIN SODIUM 20 MG
20 TABLET ORAL EVERY EVENING
Qty: 90 TABLET | Refills: 0 | Status: SHIPPED | OUTPATIENT
Start: 2021-07-07 | End: 2021-10-05

## 2021-07-07 SDOH — ECONOMIC STABILITY: FOOD INSECURITY: WITHIN THE PAST 12 MONTHS, YOU WORRIED THAT YOUR FOOD WOULD RUN OUT BEFORE YOU GOT MONEY TO BUY MORE.: NEVER TRUE

## 2021-07-07 SDOH — ECONOMIC STABILITY: FOOD INSECURITY: WITHIN THE PAST 12 MONTHS, THE FOOD YOU BOUGHT JUST DIDN'T LAST AND YOU DIDN'T HAVE MONEY TO GET MORE.: NEVER TRUE

## 2021-07-07 ASSESSMENT — SOCIAL DETERMINANTS OF HEALTH (SDOH): HOW HARD IS IT FOR YOU TO PAY FOR THE VERY BASICS LIKE FOOD, HOUSING, MEDICAL CARE, AND HEATING?: NOT HARD AT ALL

## 2021-07-07 ASSESSMENT — ENCOUNTER SYMPTOMS: BACK PAIN: 1

## 2021-07-07 NOTE — TELEPHONE ENCOUNTER
Medication:   Pending Prescriptions Disp Refills    pravastatin (PRAVACHOL) 20 MG  90 tablet 0      Patient Phone Number: [phone] (home)     Last appt: 4/7/2021   Next appt: 7/7/2021    Last OARRS:   RX Monitoring 4/24/2019   Attestation The Prescription Monitoring Report for this patient was reviewed today. Periodic Controlled Substance Monitoring -   Chronic Pain > 50 MEDD Obtained or confirmened \"Consent of Opioid Use\" on file.      Last PDMP Tito Kurtz as Reviewed [facility]):  Review User Review Instant Review Result   Misbah Camargo 7/7/2021  8:34 AM Reviewed PDMP [1]     Preferred Pharmacy:   [address] [place]

## 2021-07-14 ENCOUNTER — OFFICE VISIT (OUTPATIENT)
Dept: ORTHOPEDIC SURGERY | Age: 66
End: 2021-07-14
Payer: MEDICARE

## 2021-07-14 VITALS — BODY MASS INDEX: 22.88 KG/M2 | HEIGHT: 68 IN | WEIGHT: 151 LBS

## 2021-07-14 DIAGNOSIS — M17.0 BILATERAL PRIMARY OSTEOARTHRITIS OF KNEE: Primary | ICD-10-CM

## 2021-07-14 DIAGNOSIS — Z01.818 PREOPERATIVE TESTING: ICD-10-CM

## 2021-07-14 DIAGNOSIS — M17.11 PRIMARY OSTEOARTHRITIS OF RIGHT KNEE: ICD-10-CM

## 2021-07-14 PROCEDURE — MISCCOLD COLD THERAPY UNIT AND PAD: Performed by: ORTHOPAEDIC SURGERY

## 2021-07-14 PROCEDURE — 99214 OFFICE O/P EST MOD 30 MIN: CPT | Performed by: ORTHOPAEDIC SURGERY

## 2021-07-14 RX ORDER — IBUPROFEN 200 MG
200 TABLET ORAL EVERY 6 HOURS PRN
COMMUNITY

## 2021-07-14 NOTE — LETTER
[facility]  Surgery Scheduling Form:  [facility]    DEMOGRAPHICS:                                                                                                              .    Patient Name:  Bianca Arias  Patient :  1955   Patient SS#:      Patient Phone:  [phone] (home)  Alt. Patient Phone:    Patient Address:  [address] [address]    PCP:  PLACIDO Harris NP  Insurance:  Payor: Starla Webber / Plan: [address] [place] [address] HMO / Product Type: Medicare /   Insurance ID Number:  Payor/Plan Subscr  Sex Relation Sub. Ins. ID Effective Group Num   1. Starla Webber* Aracely Son 1955 Female Self [id number]                                    [id number]       DIAGNOSIS & PROCEDURE:                                                                                            .    Diagnosis:   Right  knee osteoarthritis M17.11  Operation:  Right  Robotic assisted total knee arthroplasty [id number], [id number]  Location:  [facility]   Provider:  Webster Francisco. Burns Lombard, M.D.    [facility] INFORMATION:                                                                                         .    Requested Date:  21    OR Time:  7:15                      Patient Arrival Time:  5:30  OR Time Required:  90 Minutes  Anesthesia:  Spinal  Equipment: Kaylee Lowry and Sylvie ADVANCED  Mini C-Arm:  No   Standard C-Arm:  No  Status: Outpatient  PAT Required:  Yes  Comments: Allergies: Darvocet [propoxyphene n-acetaminophen], Lipitor [atorvastatin], and Diclofenac  Body mass index is 22.96 kg/m².             21            BILLING INFORMATION:                                                                                                    .    Procedure:       CPT Code Modifier   Right  Robotic assisted total knee arthroplasty      ORTHOPAEDIC SURGERY PRE-SURGICAL PHYSICIAN ORDERS  Bianca Arias  1955  Date of Surgery: 8-31-21  Right  Robotic assisted total knee arthroplasty  History & Physical:  [ ] By Surgeon   By PCP M[facility]a ]  Referrals:  [ ] Medical/Cardiac Clearance by _____________________________  Malka ] Joint Replacement Class  [facility]alka ] Physical Therapy  [ ] Crutch Walking Instructions   Weight Bearing Status _____________  Malka ] Occupational Therapy  [ ] Smoking Cessation Instructions  [ ] Other ________________________________________________    Pre Admission Testing:  [ ] Hemoglobin & Hematocrit         [facility] ] Comprehensive Metabolic Panel  [facility] ] CBC with differential               [facility] ] Type & Screen  [ ] CBC without differential            [ ] Type & Crossmatch 2 units-if total hip  Malka ] PT/INR                                   [ ] Autologous Blood _____ units  [facility] ] PTT                                        [facility] ]  EKG  [ ] Urinalysis                                 [facility] ]  25 Hydroxy Vitamin D  [facility] ] Urinalysis reflex to C & S      [X] Hemoglobin A1C   [ ] Basic Metabolic Panel             [facility] ] MRSA-nasal  [facility]  ] Other Anesthesia Guidelines     Orders to be initiated upon admission to same day surgery:  Malka ] Fasting blood sugar by fingerstick [ X] Pre-Op Block adductor canal  [facility] ] PT/INR (pt takes Warafin/Coumadin)     [ ] Interscalene Right or Left  [facility] ] HCG __X__ Urine _____ Serum              [ ] Femoral   Right or Left  [X] Rapid Covid test if patient does not have proof of vaccination  [ ] Other ______________________________________________    IV Fluids and Medications:  1. Place IV catherer for IV access. The RN may use 0.1ml of 1% Lidocaine SQ      Per site for IV starts up to maximum of 0.5ml.  2. Infuse Lactated Ringers IV fluid at 50ml per hour. For diabetic or renal impaired patient, infuse 0.9% Normal Saline at 50ml/hr. 3. Cefazolin 2g IV <119.9kg (264lbs) OR 3g if >120kg (466TQK), given within one hour of incision time.   For those allergic to Cephalosporins, give Clindamycin 900mg IV within one hour of incision time. If the pre-op nasal culture for MRSA/MSSA was positive, add Vancomycin 1 gm IVPB if <80kg (176lbs) OR 1.5gm  80kg-120kg (176-264lbs) OR 2gm  >120kg (264lbs). Reduce dose of Vancomycin to 500 mg IVPB if PT < 55 kg or serum creatinine > 2 mg/dl (Vancomycin must be administered over 1 hour).   4. Other medications: Celebrex 400mg pre-op    Additional Orders:  Alma Bullock Knee high anti-embolism stockings and antithrombic compression pumps (apply in Pre-op)                            Physican Signature:Date: 7/14/2021 Time: 2:36 PM

## 2021-07-14 NOTE — PROGRESS NOTES
Evita Bianchi MD  [address], [address] [place] [address] Drive  [address] [facility] 108, Scottie Wade 19     History of Present Illness:  Chief Complaint   Patient presents with    Knee Pain     Chronic Bilateral knee pain. Referred by Dr Ashley Shaw, for surgical consult. Julia Peacock is a 78 y.o. female here for evaluation of right knee pain. Pain assessment has been completed and is documented below. Patient was referred here for orthopaedic evaluation by Dr. Ana Easley. She is here for chronic pain and to discuss possible surgical options, per Dr. Ashley Shaw. Right knee pain levels average 7/10 and interfere with daily activity. She has failed cortisone and hyaluronic acid injections. Medication Review:  No current facility-administered medications for this visit. No current outpatient medications on file.      Facility-Administered Medications Ordered in Other Visits   Medication Dose Route Frequency Provider Last Rate Last Admin    lactated ringers infusion   IntraVENous Continuous Lorena Schuler MD        lidocaine PF 1 % injection 0.5 mL  0.5 mL IntraDERmal Once Lorena Schuler MD        ceFAZolin (ANCEF) 2000 mg in dextrose 5 % 50 mL IVPB  2,000 mg IntraVENous On Call to [facility], MD   Stopped at 08/31/21 5524    ortho mix injection   Injection On Call Lorena Schuler MD        tranexamic acid (CYKLOKAPRON) 1,000 mg in sodium chloride 0.9 % 50 mL IVPB  1,000 mg IntraVENous Once Lorena Schuler MD        meperidine (DEMEROL) injection 12.5 mg  12.5 mg IntraVENous Q5 Min PRN Carlos Murillo MD        HYDROmorphone (DILAUDID) injection 0.5 mg  0.5 mg IntraVENous Q5 Min PRN Carlos Murillo MD        ondansetron [facility]) injection 4 mg  4 mg IntraVENous Once PRN Carlos Murillo MD        labetalol (NORMODYNE;TRANDATE) injection 5 mg  5 mg IntraVENous Q10 Min PRN Nathaniel Minus Michelina Shone, MD        hydrALAZINE (APRESOLINE) injection 5 mg  5 mg IntraVENous Q10 Min PRN Christina Foy MD        lactated ringers infusion   IntraVENous Continuous Christina Foy MD        sodium chloride flush 0.9 % injection 10 mL  10 mL IntraVENous 2 times per day Christina Foy MD        sodium chloride flush 0.9 % injection 10 mL  10 mL IntraVENous PRN Christina Foy MD        0.9 % sodium chloride infusion  25 mL IntraVENous PRN Christina Foy MD        lidocaine PF 1 % injection 1 mL  1 mL IntraDERmal Once PRN Christina Foy MD             Review of Systems:  Relevant review of systems reviewed and can be found in the Media section of patient's chart. Medical History:  Past Medical History:   Diagnosis Date    Anxiety     Fibromyalgia     Gout     Hyperlipidemia     Hypertension     Osteoarthritis         Past Surgical History:   Procedure Laterality Date    APPENDECTOMY  02/13/2019    [facility]. INJECTION PROCEDURE FOR SACROILIAC JOINT Right 08/31/2020    RIGHT SACROILIAC JOINT INJECTION AND RIGHT GREATER TROCHANTERIC BURSA INJECTION WITH FLUOROSCOPY (45299, [id number]) performed by Ollie Diez MD at [facility]    KNEE ARTHROSCOPY Right 2009    KNEE ARTHROSCOPY Left     PAIN MANAGEMENT PROCEDURE Right 02/12/2020    RIGHT L4 AND L5 TRANSFORAMINAL EPIDURAL STEROID INJECTION WITH FLUOROSCOPY ([id number], [id number]) performed by Ollie Diez MD at [address] Right 12/02/2020    RIGHT L4 AND L5 TRANSFORAMINAL EPIDURAL STEROID INJECTION WITH FLUOROSCOPY performed by Ollie Diez MD at [address], social and family histories, and medications were reviewed and updated as appropriate. General Exam:  Vital Signs:  Ht 5' 8\" (1.727 m)   Wt 151 lb (68.5 kg)   BMI 22.96 kg/m²    Constitutional: Patient is adequately groomed with no evidence of malnutrition.   Mental Status: The patient is oriented to time, place and person. The patient's mood and affect are appropriate. Lymphatic: The lymphatic examination bilaterally reveals all areas to be without enlargement or induration. Vascular: Examination reveals no swelling or calf tenderness. 2+ palpable pulses distally. Neurological: The patient has good coordination. There is no focal weakness or sensory deficit. Focal examination of the right knee is as follows: Inspection & Skin:  Knee shows mild valgus alignment. Normal muscle bulk and tone for patient's age and activity level. No significant effusion. There are no rashes, ulcerations or lesions. No erythema. Palpation:  Moderate tenderness on palpation along medial joint line. Moderate tenderness on palpation along lateral joint line. Extensor mechanism intact on palpation. Range of Motion:     Extension: 0°   Flexion: 125°    Strength:     Quad 5-/5.  Hamstrings 5-/5.  Hip and ankle motor function are grossly intact. Special Tests:    Does not open to valgus or varus stress at 0 or 30°   Anterior drawer / posterior drawer negative   No posterior sag   Patellar grind negative    Logroll negative    Stinchfield's examination negative    Ricardo's sign negative   Posterior tibial pulse are +2/4, capillary refill is brisk, sensation in intact    Gait: altered stride length and decreased weight shift favoring right side. Radiology:       X-rays obtained 3/29/21 have been reviewed in office:  Views 4V: right knee with comparison AP, flexion PA, and skyline views. Impression: No evidence for acute fracture, subluxation, or dislocation. No lytic or blastic lesions in the metaphyseal regions. Imagin view x-rays of the right and left knees obtained in the office today on 3/29/2021 were reviewed. There is no fracture or dislocation.   Tricompartmental degenerative changes of both knees which are most pronounced within the lateral compartments where there is severe joint space loss and osteophyte formation.     Office Orders/Procedures:  Orders Placed This Encounter   Procedures    Cold Therapy Unit and Pad $150     Scheduling Instructions:      Patient was supplied a Cold Therapy Unit and Pad. This retail item was supplied to provide functional support and assist in protecting the affected area. Verbal and written instructions for the use of and application of this item were provided. The patient was educated and fit by a healthcare professional with expert knowledge and specialization in brace application. They were instructed to contact the office immediately should the equipment result       in increased pain, decreased sensation, increased swelling or worsening of the condition.  Culture, MRSA, Screening     Standing Status:   Future     Number of Occurrences:   1     Standing Expiration Date:   7/14/2022    Vitamin D 25 Hydroxy     Standing Status:   Future     Number of Occurrences:   1     Standing Expiration Date:   7/14/2022    Urinalysis Reflex to Culture     Standing Status:   Future     Number of Occurrences:   1     Standing Expiration Date:   7/14/2022     Order Specific Question:   SPECIFY(EX-CATH,MIDSTREAM,CYSTO,ETC)? Answer:   midstream    Protime-INR     Standing Status:   Future     Number of Occurrences:   1     Standing Expiration Date:   7/14/2022     Order Specific Question:   Daily Coumadin Dose?      Answer:   unknown    Hemoglobin A1C     Standing Status:   Future     Number of Occurrences:   1     Standing Expiration Date:   7/14/2022    Comprehensive Metabolic Panel     Standing Status:   Future     Number of Occurrences:   1     Standing Expiration Date:   7/14/2022    CBC Auto Differential     Standing Status:   Future     Number of Occurrences:   1     Standing Expiration Date:   7/14/2022    APTT     Standing Status:   Future     Number of Occurrences:   1     Standing Expiration Date:   7/14/2022     Order Specific Question:   Daily Heparin Dose? Answer:   unknown    Ambulatory referral to Physical Therapy     Referral Priority:   Routine     Referral Type:   Eval and Treat     Referral Reason:   Specialty Services Required     Number of Visits Requested:   1    EKG 12 Lead     Standing Status:   Future     Number of Occurrences:   1     Standing Expiration Date:   7/14/2022     Order Specific Question:   Reason for Exam?     Answer:   Pre-op    Type and Screen     Standing Status:   Future     Number of Occurrences:   1     Standing Expiration Date:   7/14/2022       Impression:   Diagnosis Orders   1. Bilateral primary osteoarthritis of knee     2. Preoperative testing  Ambulatory referral to Physical Therapy    Vitamin D 25 Hydroxy    Urinalysis Reflex to Culture    Type and Screen    Protime-INR    Culture, MRSA, Screening    Hemoglobin A1C    EKG 12 Lead    Comprehensive Metabolic Panel    CBC Auto Differential    APTT   3. Primary osteoarthritis of right knee  Ambulatory referral to Physical Therapy    Vitamin D 25 Hydroxy    Urinalysis Reflex to Culture    Type and Screen    Protime-INR    Culture, MRSA, Screening    Hemoglobin A1C    EKG 12 Lead    Comprehensive Metabolic Panel    CBC Auto Differential    APTT    Cold Therapy Unit and Pad $150        Treatment Plan:  We discussed treatment options today. She does have a history of cortisone and gel shots without much relief. The right knee pain is continuing to affect her daily life activities and quality of life. With both of these factors, I do believe she at the point of a knee replacement being the logical, permanent fix for her issues and pain. She does states the right knee bothers her the most. We have both agreed to proceed with surgery and will begin with the right knee. She understands that surgery does not provide a guarantee of complete pain relief.      We discussed the option of proceeding onto elective right total knee arthroplasty. I reviewed with her the nature the procedure as well as the risks and advantages of joint replacement. We reviewed the risks of surgery and she understands that they include but are not limited to: Infection, risk to neurovascular structures, stiffness and pain, potential for further surgery, anesthetic misadventure, component failure or dislocation, fracture of the bone, medical complications such as heart attacks, strokes, blood clots and pneumonia all of which could threaten her life or limb. We reviewed discharge destination as outlined below. We also reviewed the demand matching grid and will use advanced demand smith and nephew bearing surface system. She will undergo her preadmission testing as well as preoperative physical therapy assessment. She will also attend the preoperative joint education class. Unless there are items that we need to address through testing that would delay her surgery, at this point I will see her at the time of surgery and she will follow back. Should additional questions arise prior to surgery, she was asked to call the office for any clarifications that are necessary. RAPT  RISK ASSESSMENT and PREDICTION TOOL    Name: Janice Zee  Date of [address]  Surgeon: Dr Radha Corona         Value Score    1). What is your age group? 50 - 65 years  = 2      66 - 75 years = 1     > 75 years = 0       Your score = 1   2). Gender? Male = 2     Female = 1       Your score = 1   3). How far on average can you walk? Two blocks or more (+/- rest) = 2    (a block is 200 smoeis=118 ft)  1 - 2 blocks (+/- rest) = 1     Housebound (most of time) = 0       Your score = 2   4). Which gait aid do you use? None = 2    (more often than not) Single-point stick = 1     Crutches/walker = 0       Your score = 2   5). Do you use community supports?  None or one per week = 1    (home help, meals on wheels, district nursing) Two or more per week = 0       Your score = 1   6). Will you live with someone who can care for you after your operation? yes = 3     no = 0       Your score = 3    Your Total Score (out of 12) = 10       Key: Destination at discharge from acute care predicted by score. Score < 6  = extended inpatient rehabilitation  Score 6 - 9  = additional intervention to discharge directly home (Rehab in the home)  Score > 9  = directly home      Patient's Preference Prediction Score Agreed destination   home 10 home   [address]  Date: 7/14/21             I have reviewed patient's pertinent medical history, relevant laboratory and imaging studies, and past surgical history. Patient's medications have been reviewed and were discussed during the visit. Patient was advised to keep future appointments with their respective specialty care team(s). Patient had the opportunity to ask questions, all of which were answered to the best of my ability and with patient satisfaction. Patient understands and is agreeable with the care plan following today's visit. Patient is to schedule an appointment for any new or worsening symptoms. By signing my name below, Gracia Simmonds, attmary lou that this documentation has been prepared under the direction and in the presence of Deisi Galloway MD.   Electronically Signed: Maliha White, 7/14/21, 12:59 PM EDT. Jayshree Pyle MD, personally performed the services described in this documentation. All medical record entries made by the maliha were at my direction and in my presence. I have reviewed the chart and discharge instructions (if applicable) and agree that the record reflects my personal performance and is accurate and complete. Deisi Galloway MD, 8/31/21, 7:33 AM EDT. Some documentation was done using voice recognition dragon software. Every effort was made to ensure accuracy; however, inadvertent unintentional computerized transcription errors may be present.

## 2021-07-15 RX ORDER — FUROSEMIDE 80 MG
TABLET ORAL
Qty: 90 TABLET | Refills: 1 | Status: SHIPPED | OUTPATIENT
Start: 2021-07-15 | End: 2022-01-12

## 2021-07-15 NOTE — TELEPHONE ENCOUNTER
Medication:   Requested Prescriptions     Pending Prescriptions Disp Refills    furosemide (LASIX) 80 MG tablet [Pharmacy Med Name: FUROSEMIDE 80MG TABLETS] 90 tablet 1     Sig: TAKE 1 TABLET  [facility] DAY       Patient Phone Number: [phone] (home)     Last appt: 7/7/2021   Next appt: 10/7/2021    Last OARRS:   RX Monitoring 4/24/2019   Attestation The Prescription Monitoring Report for this patient was reviewed today. Periodic Controlled Substance Monitoring -   Chronic Pain > 50 MEDD Obtained or confirmened \"Consent of Opioid Use\" on file.      PDMP Monitoring:    Last PDMP [place] Records as Reviewed [facility]):  Review User Review Instant Review Result   Marcella Bolanos 7/7/2021  8:34 AM Reviewed PDMP [1]     Preferred Pharmacy:   [address]  [facility] [address]  Phone: [phone] Fax: [phone]

## 2021-07-21 ENCOUNTER — TELEPHONE (OUTPATIENT)
Dept: ORTHOPEDIC SURGERY | Age: 66
End: 2021-07-21

## 2021-07-21 NOTE — PROGRESS NOTES
Name_______________________________________Printed:____________________  Date and time of surgery__8/31 0715______________________Arrival Time:__0545______________   1. The instructions given regarding when and if a patient needs to stop oral intake prior to surgery varies. Follow the specific instructions you were given                  ___Nothing to eat or to drink after Midnight the night before. __x__Carbo loading or ERAS instructions will be given to select patients-if you have been given those instructions -please do the following                           The evening before your surgery after dinner before midnight drink 40 ounces of gatorade. If you are diabetic use sugar free. The morning of surgery drink 40 ounces of water. This needs to be finished 3 hours prior to your surgery start time. 2. Take the following pills with a small sip of water on the morning of surgery___gabapentin metoprolol________________________________________________                  Do not take blood pressure medications ending in pril or sartan the amarilis prior to surgery or the morning of surgery_   3. Aspirin, Ibuprofen, Advil, Naproxen, Vitamin E and other Anti-inflammatory products and supplements should be stopped for 5 -7days before surgery or as directed by your physician. 4. Check with your Doctor regarding stopping Plavix, Coumadin,Eliquis, Lovenox,Effient,Pradaxa,Xarelto, Fragmin or other blood thinners and follow their instructions. 5. Do not smoke, and do not drink any alcoholic beverages 24 hours prior to surgery. This includes NA Beer. Refrain from the usage of any recreational drugs. 6. You may brush your teeth and gargle the morning of surgery. DO NOT SWALLOW WATER   7. You MUST make arrangements for a responsible adult to stay on site while you are here and take you home after your surgery. You will not be allowed to leave alone or drive yourself home.   It is strongly suggested someone stay with you the first 24 hrs. Your surgery will be cancelled if you do not have a ride home. 8. A parent/legal guardian must accompany a child scheduled for surgery and plan to stay at the hospital until the child is discharged. Please do not bring other children with you. 9. Please wear simple, loose fitting clothing to the hospital.  Carrie Long not bring valuables (money, credit cards, checkbooks, etc.) Do not wear any makeup (including no eye makeup) or nail polish on your fingers or toes. 10. DO NOT wear any jewelry or piercings on day of surgery. All body piercing jewelry must be removed. 11. If you have ___dentures, they will be removed before going to the OR; we will provide you a container. If you wear ___contact lenses or ___glasses, they will be removed; please bring a case for them. 12. Please see your family doctor/pediatrician for a history & physical and/or concerning medications. Bring any test results/reports from your physician's office. PCP__________________Phone___________H&P Appt. Date________             13 If you  have a Living Will and Durable Power of  for Healthcare, please bring in a copy. 15. Notify your Surgeon if you develop any illness between now and surgery  time, cough, cold, fever, sore throat, nausea, vomiting, etc.  Please notify your surgeon if you experience dizziness, shortness of breath or blurred vision between now & the time of your surgery             15. DO NOT shave your operative site 96 hours prior to surgery. For face & neck surgery, men may use an electric razor 48 hours prior to surgery. 16. Shower the night before or morning of surgery using an antibacterial soap or as you have been instructed. 17. To provide excellent care visitors will be limited to one in the room at any given time. 18.  Please bring picture ID and insurance card.              19.  Visit our web site for additional information:  [email or web address]              20.During flu season no children under the age of 15 are permitted in the hospital for the safety of all patients. 21. If you take a long acting insulin in the evening only  take half of your usual  dose the night  before your procedure              22. If you use a c-pap please bring DOS if staying overnight,             23.For your convenience [facility] has a pharmacy on site to fill your prescriptions. 24. If you use oxygen and have a portable tank please bring it  with you the DOS             25. Bring a complete list of all your medications with name and dose include any supplements. 26. Other__________________________________________   *Please call pre admission testing if you any further questions   [facility]    [address]  [phone]   [facility]    __ Done-Where _____  __ Scheduled ___ Where ___   __ Other __________  x__ VACCINATED-instructed to bring card DOS      VISITOR POLICY(subject to change)    There is a one visitor policy at [facility] for all surgeries and endoscopies. Whether the visitor can stay or will be asked to wait in the car will depend on the current policy and if social distancing can be maintained. The policy is subject to change at any time. Please make sure the visitor has a cell phone that is on,charged and able to accept calls, as this may be the way that the staff communicates with them. Pain management is NO VISITOR policyThe patients ride is expected to remain in the car with a cell phone for communication. If the ride is leaving the hospital grounds please make sure they are back in time for pickup. Have the patient inform the staff on arrival what their rides plans are while the patient is in the facility. At the MAIN there is one visitor allowed. Please note that the visitor policy is subject to change. All above information reviewed with patient in person or by phone. Patient verbalizes understanding. All questions and concerns addressed.                                                                                                  Patient/Rep____________________                                                                                                               Per phone/pt                     PRE OP INSTRUCTIONS

## 2021-07-21 NOTE — TELEPHONE ENCOUNTER
As long as she understands that FMLA usually only covers you for 12 weeks and some patients take that long to get back to work after their surgery date. If she starts FMLA now it will only guarantee that her job will still be there 12 weeks from now which means she would need to be back to full work activity within 6 weeks of her surgery date. 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

## 2021-07-21 NOTE — TELEPHONE ENCOUNTER
Patient is asking if he can start his fmla now due to pain and required appt.    Ok to leave voicemail

## 2021-07-21 NOTE — PROGRESS NOTES
The patient will attend class on____8/9___________  The patent will get ordered PATs on_8/9__________________________________  The patient will see PCP within 30 days of scheduled surgery__8/10____________  COVID__vaccinated_______

## 2021-07-22 NOTE — TELEPHONE ENCOUNTER
Pt requesting to start her FMLA leave 7/27/21. She understands the 12 week coverage and she will be shorting her coverage during the post-op phase. She did not mind it being cut short or worried if her work decided to fire her due to her not being able to work full time once [employer] runs out.

## 2021-07-22 NOTE — TELEPHONE ENCOUNTER
General Question     Subject: PATIENT RETURNING CALL REGARDING SX  Patient and /or Facility Request: PATIENT  Contact Number: [phone]

## 2021-07-23 ENCOUNTER — TELEPHONE (OUTPATIENT)
Dept: ORTHOPEDIC SURGERY | Age: 66
End: 2021-07-23

## 2021-07-23 NOTE — TELEPHONE ENCOUNTER
RECEIVED FMLA PAPERWORK TO COMPLETE FROM PATIENT.  WAITING TO HEAR FROM [employer] REGARDING BEGINNING DATE    FAXED COMPLETED FMLA TO NENITA FULLER/HR AT [employer] @ [phone]

## 2021-07-26 ENCOUNTER — HOSPITAL ENCOUNTER (OUTPATIENT)
Dept: PHYSICAL THERAPY | Age: 66
Setting detail: THERAPIES SERIES
Discharge: HOME | End: 2021-07-26
Payer: MEDICARE

## 2021-07-26 PROCEDURE — 97750 PHYSICAL PERFORMANCE TEST: CPT

## 2021-07-26 PROCEDURE — 97161 PT EVAL LOW COMPLEX 20 MIN: CPT

## 2021-07-26 PROCEDURE — 97110 THERAPEUTIC EXERCISES: CPT

## 2021-07-26 NOTE — FLOWSHEET NOTE
[facility]  Phone: [phone]   Fax: [phone]    Physical Therapy Treatment Note/ Progress Report:   Date:  2021    Patient Name:  Julia Peacock    :  1955  MRN: [id number]  Medical/Treatment Diagnosis Information:  · Diagnosis: Z01.818 (ICD-10-CM) - Preoperative testing; M17.11 (ICD-10-CM) - Primary osteoarthritis of right knee  · Treatment Diagnosis: dec LE strength, antalgic gait, difficulty standing and walking to complete work-related tasks  Insurance/Certification information:  PT Insurance Information: [employer] [place], [place]40 cop[place]  Physician Information:  Referring Practitioner: Lorena Schuler MD  Plan of care signed (Y/N): []  Yes []  No     Date of Patient follow up with Physician: Surgery = 21     Progress Report: []  Yes  []  No     Date Range for reporting period:  Beginnin/26  Ending:     Progress report due (10 Rx/or 30 days whichever is less): visit #10 or  (date)     Recertification due (POC duration/ or 90 days whichever is less): visit # or  (date)     Visit # Insurance Allowable Auth required?  Date Range   1 [place] []  Yes  [x]  No      Latex Allergy:  [x]NO      []YES  Preferred Language for Healthcare:   [x]English       []other:    Functional Scale:        Date assessed:  LEFS: raw score = 25; dysfunction = 68.75%  21    Pain level:  4-5/10     SUBJECTIVE:  See eval    OBJECTIVE: See eval    Girth (cm) L - pre-vaso / post-vaso R - pre-vaso / post-vaso   Mid-patellar     Suprapatellar         RESTRICTIONS/PRECAUTIONS: fall risk    Exercises/Interventions:     Therapeutic Exercises  Resistance / level Sets/sec Reps Notes   Ankle pumps     Gastroc stretch - seated w/ towel pull     HS stretch - seated     Heel slides - knee flexion PROM          SLR - flexion     SLR - abduction                          HEP review  Per HEP Per HEP X 10 min   Neuromuscular Re-ed / Therapeutic Activities       [employer] sets     Preop objective measures and physical performance testing  X 15 min  6MWT, TUG, 30 sec sit<>stand was performed to assess fall risk and pt able to complete functional testing independently without the use of AD                               Manual Intervention       Knee mobs/PROM       Tib/Fem Mobs       Patella Mobs       Ankle mobs                       Patient Education:  -pt educated on diagnosis, prognosis and expectations for rehab  -post-op instructions provided including but not limited to surgical procedure / precautions, S&S of infection and DVT, showering / hygiene, WB status and assistive devices  -all pt questions were answered    Home Exercise Program:  Access Code: [email or web address]  URL: [email or web address]  Date: 07/26/2021  Prepared by: Jeffrey Rodriguez    Exercises  Supine Figure 4 Piriformis Stretch - 2 x daily - 7 x weekly - 1 sets - 3 reps - 30 hold  Supine Piriformis Stretch with Foot on Ground - 2 x daily - 7 x weekly - 1 sets - 3 reps - 30 hold  Supine Bridge - 1 x daily - 7 x weekly - 2 sets - 10 reps  Supine Quad Set - 1 x daily - 7 x weekly - 1 sets - 10 reps - 10 hold  Supine Active Straight Leg Raise - 1 x daily - 7 x weekly - 2 sets - 10 reps  Sidelying Hip Abduction - 1 x daily - 7 x weekly - 2 sets - 10 reps  Clamshell - 1 x daily - 7 x weekly - 2 sets - 10 reps  Sidelying Reverse Clamshell - 1 x daily - 7 x weekly - 2 sets - 10 reps      Therapeutic Exercise and NMR EXR  [x] (51594) Provided verbal/tactile cueing for activities related to strengthening, flexibility, endurance, ROM for improvements in LE, proximal hip, and core control with self care, mobility, lifting, ambulation.  [] (87016) Provided verbal/tactile cueing for activities related to improving balance, coordination, kinesthetic sense, posture, motor skill, proprioception  to assist with LE, proximal hip, and core control in self care, mobility, lifting, ambulation and eccentric single leg control.      NMR and Therapeutic Activities:    [] (52992 or 25383) Provided verbal/tactile cueing for activities related to improving balance, coordination, kinesthetic sense, posture, motor skill, proprioception and motor activation to allow for proper function of core, proximal hip and LE with self care and ADLs  [] (32171) Gait Re-education- Provided training and instruction to the patient for proper LE, core and proximal hip recruitment and positioning and eccentric body weight control with ambulation re-education including up and down stairs     Home Exercise Program:    [x] (23083) Reviewed/Progressed HEP activities related to strengthening, flexibility, endurance, ROM of core, proximal hip and LE for functional self-care, mobility, lifting and ambulation/stair navigation   [] (04048)Reviewed/Progressed HEP activities related to improving balance, coordination, kinesthetic sense, posture, motor skill, proprioception of core, proximal hip and LE for self care, mobility, lifting, and ambulation/stair navigation      Manual Treatments:  PROM / STM / Oscillations-Mobs:  G-I, II, III, IV (PA's, Inf., Post.)  [] (33518) Provided manual therapy to mobilize LE, proximal hip and/or LS spine soft tissue/joints for the purpose of modulating pain, promoting relaxation,  increasing ROM, reducing/eliminating soft tissue swelling/inflammation/restriction, improving soft tissue extensibility and allowing for proper ROM for normal function with self care, mobility, lifting and ambulation. Modalities:  [] (44427) Vasopneumatic compression: Utilized vasopneumatic compression to decrease edema / swelling for the purpose of improving mobility and quad tone / recruitment which will allow for increased overall function including but not limited to self-care, transfers, ambulation, and ascending / descending stairs.        Modalities      Charges:  Timed Code Treatment Minutes: 25   Total Treatment Minutes: 45     [x] EVAL - LOW (45805)   [] EVAL - MOD (07009)  [] EVAL - HIGH (91390)  [] RE-EVAL (42040)  [x] KD(97341) x  1   [] IONTO  [] NMR (50535) x     [] VASO  [] Manual (29064) x     [x] Other: physical performance testing 8:15-8:30  [] TA x      [] Mech Traction (95545)  [] ES(attended) (17785)     [] ES (un) (99984):     GOALS:   Patient stated goal: dec pain when walking, reduce falls  []? Progressing: []? Met: []? Not Met: []? Adjusted     Therapist goals for Patient:   Short Term Goals: To be achieved in: 1 visit  1. Independent in HEP and progression per patient tolerance, in order to prevent re-injury. []? Progressing: []? Met: []? Not Met: []? Adjusted  2. Patient will have a decrease in pain to facilitate improvement in movement, function, and ADLs as indicated by Functional Deficits. []? Progressing: []? Met: []? Not Met: []? Adjusted  3. All patient questions regarding expectations for rehab following upcoming surgery are answered. []? Progressing: []? Met: []? Not Met: []? Adjusted     Long Term Goals: long term goals to be determined at re-eval following upcoming surgery    Overall Progression Towards Functional goals/ Treatment Progress Update:  [] Patient is progressing as expected towards functional goals listed. [] Progression is slowed due to complexities/Impairments listed. [] Progression has been slowed due to co-morbidities.   [x] Plan just implemented, too soon to assess goals progression <30days   [] Goals require adjustment due to lack of progress  [] Patient is not progressing as expected and requires additional follow up with physician  [] Other    Persisting Functional Limitations/Impairments:  []Sitting [x]Standing   [x]Walking [x]Squatting/bending    [x]Stairs []ADL's    []Transfers [x]Sleeping   []Housework [x]Job related tasks  []Driving []Sports/Recreation   []Other:    ASSESSMENT:  See eval    Treatment/Activity Tolerance:  [x] Patient able to complete tx  [] Patient limited by fatique  [] Patient limited by pain  [] Patient limited by other medical complications  [] Other:     Prognosis: [x] Good [] Fair  [] Poor    Patient Requires Follow-up: [x] Yes  [] No    Return to Play:    [x]  N/A   []  Stage 1: Intro to Strength   []  Stage 2: Return to Run and Strength   []  Stage 3: Return to Jump and Strength   []  Stage 4: Dynamic Strength and Agility   []  Stage 5: Sport Specific Training     []  Ready to Return to Play, Meets All Above Stages   []  Not Ready for Return to Sports   Comments:            PLAN: See eval. TBD at post-op visit  [] Continue per plan of care [] Alter current plan (see comments)  [x] Plan of care initiated [] Hold pending MD visit [] Discharge    Electronically signed by: Fang Collins, PT PT, DPT    Note: If patient does not return for scheduled/ recommended follow up visits, this note will serve as a discharge from care along with most recent update on progress.

## 2021-07-26 NOTE — PLAN OF CARE
(include devices/WB status) mild R lateral trunk lean during R stance phase    Dermatomes - not assessed Normal Abnormal Comments   inguinal area (L1)       anterior mid-thigh (L2)      distal ant thigh/med knee (L3)      medial lower leg and foot (L4)      lateral lower leg and foot (L5)      posterior calf (S1)      medial calcaneus (S2)          Myotomes - not assessed Normal Abnormal Comments   Hip flexion (L1-L2)      Knee extension (L2-L4)      Dorsiflexion (L4-L5)      Great Toe Ext (L5)      Ankle Eversion (S1-S2)      Ankle PF(S1-S2)          Reflexes - not assessed Normal Abnormal Comments   S1-2 Seated achilles      S1-2 Prone knee bend      L3-4 Patellar tendon      Clonus      Babinski           PROM AROM    L R L R   Knee Flexion   140 143   Knee Extension   0 5 deg hyperextension       Strength (0-5) Left Right   Hip Flexion - supine     Hip Flexion - seated     Hip Abduction - sidelyling 15.4# 9.8#   Hip Adduction     Hip Extension     Quads 22.1# 21.0#   Hamstrings          Flexibility     Hamstrings (90/90)     ITB (Lynn)     Quads (Ely's)     Hip Flexor Hildy Deis)          Girth     Mid patella     Suprapatellar         Joint mobility: patellofemoral    []Normal    []Hypo   []Hyper         Patient in OrthoVitals?: [] Yes [x] No    Functional Testing  Sx Date 8/31/21 Prehab Date 7/26/21 Post-op Re-Eval Date  4 week F/U date  8 week F/U date  D/C Date       TUG (sec) 11       30 second sit to stand (reps) 9       6 minute walk (m) 368.8          Balance:    Narrowed VANNA (sec) 10       Semi Tandem (sec) 10       Tandem (sec) 10       SLS (sec) 0          Knee AROM L R L R L R L R L R   Flexion 140 143           Extension 0 5 deg hyperext              Knee Ext: L R L R L R L R L R   MMT (of 5) 5 5           Knee Ext (#) 22.1 21.0              Hip Abd:  L R L R L R L R L R   MMT (of 5) 4 3+           Hip Abd (#) 15.4 9.8              LEFS (raw) 25       OV entered no                              [x] Patient history, allergies, meds reviewed. Medical chart reviewed. See intake form. Review Of Systems (ROS):  [x]Performed Review of systems (Integumentary, CardioPulmonary, Neurological) by intake and observation. Intake form has been scanned into medical record. Patient has been instructed to contact their primary care physician regarding ROS issues if not already being addressed at this time.     Co-morbidities/Complexities (which will affect course of rehabilitation):   []None        []Hx of COVID   Arthritic conditions   []Rheumatoid arthritis (M05.9)  [x]Osteoarthritis (M19.91)  [x]Gout   Cardiovascular conditions   [x]Hypertension (I10)  [x]Hyperlipidemia (E78.5)  []Angina pectoris (I20)  []Atherosclerosis (I70)  []Pacemaker  []Hx of CABG/stent/  cardiac surgeries   Musculoskeletal conditions   []Disc pathology   []Congenital spine pathologies   []Osteoporosis (M81.8)  []Osteopenia (M85.8)  []Scoliosis       Endocrine conditions   []Hypothyroid (E03.9)  []Hyperthyroid Gastrointestinal conditions   []Constipation (H70.69)   Metabolic conditions   []Morbid obesity (E66.01)  []Diabetes type 1(E10.65) or 2 (E11.65)   []Neuropathy (G60.9)     Cardio/Pulmonary conditions   []Asthma (J45)  []Coughing   []COPD (J44.9)  []CHF  []A-fib   Psychological Disorders  [x]Anxiety (F41.9)  []Depression (F32.9)   []Other:   Developmental Disorders  []Autism (F84.0)  []CP (G80)  []Down Syndrome (Q90.9)  []Developmental delay     Neurological conditions  []Prior Stroke (I69.30)  []Parkinson's (G20)  []Encephalopathy (G93.40)  []MS (G35)  []Post-polio (G14)  []SCI  []TBI  []ALS Other conditions  [x]Fibromyalgia (M79.7)  []Vertigo  []Syncope  []Kidney Failure  []Cancer      []currently undergoing                treatment  []Pregnancy  []Incontinence   Prior surgeries  []involved limb  []previous spinal surgery  [] section birth  []hysterectomy  []bowel / bladder surgery  []other relevant surgeries   []Other: Barriers to/and or personal factors that will affect rehab potential:              [x]Age  [x]Sex    [x]Smoker              []Motivation/Lack of Motivation                        []Co-Morbidities              []Cognitive Function, education/learning barriers              []Environmental, home barriers              [x]profession/work barriers  []past PT/medical experience  []other:  Justification:     Falls Risk Assessment (30 days):   [x] Falls Risk assessed and no intervention required. [] Falls Risk assessed and Patient requires intervention due to being higher risk   TUG score (>12s at risk):     [] Falls education provided, including         ASSESSMENT: Janice Koch is a 76 y.o. female presenting to physical therapy for pre-op assessment prior to scheduled R TKA on 8/31/21. Pt demonstrates dec LE strength, antalgic gait, difficulty standing and walking to complete work-related tasks. Pt would benefit from skilled PT to return to PLOF and no restrictions to work related tasks.     Functional Impairments:     []Noted lumbar/proximal hip/LE joint hypomobility   []Decreased LE functional ROM   [x]Decreased core/proximal hip strength and neuromuscular control   [x]Decreased LE functional strength   [x]Reduced balance/proprioceptive control   []other:      Functional Activity Limitations (from functional questionnaire and intake)   []Reduced ability to tolerate prolonged functional positions   []Reduced ability or difficulty with changes of positions or transfers between positions   []Reduced ability to maintain good posture and demonstrate good body mechanics with sitting, bending, and lifting   [x]Reduced ability to sleep   []Reduced ability or tolerance with driving and/or computer work   [x]Reduced ability to perform lifting, carrying tasks   []Reduced ability to squat   []Reduced ability to forward bend   [x]Reduced ability to ambulate prolonged functional periods/distances/surfaces   [x]Reduced ability to ascend/descend stairs   []Reduced ability to run, hop, cut or jump   []other:    Participation Restrictions   []Reduced participation in self care activities   [x]Reduced participation in home management activities   [x]Reduced participation in work activities   []Reduced participation in social activities. []Reduced participation in sport/recreation activities. Classification :    []Signs/symptoms consistent with post-surgical status including decreased ROM, strength and function.    []Signs/symptoms consistent with joint sprain/strain   []Signs/symptoms consistent with patella-femoral syndrome   [x]Signs/symptoms consistent with knee OA/hip OA   []Signs/symptoms consistent with internal derangement of knee/Hip   []Signs/symptoms consistent with functional hip weakness/NMR control      []Signs/symptoms consistent with tendinitis/tendinosis    []signs/symptoms consistent with pathology which may benefit from Dry needling      []other:      Prognosis/Rehab Potential:      []Excellent   [x]Good    []Fair   []Poor    Tolerance of evaluation/treatment:    []Excellent   [x]Good    []Fair   []Poor    Physical Therapy Evaluation Complexity Justification  [x] A history of present problem with:  [] no personal factors and/or comorbidities that impact the plan of care;  []1-2 personal factors and/or comorbidities that impact the plan of care  [x]3 personal factors and/or comorbidities that impact the plan of care  [x] An examination of body systems using standardized tests and measures addressing any of the following: body structures and functions (impairments), activity limitations, and/or participation restrictions;:  [] a total of 1-2 or more elements   [x] a total of 3 or more elements   [] a total of 4 or more elements   [x] A clinical presentation with:  [x] stable and/or uncomplicated characteristics   [] evolving clinical presentation with changing characteristics  [] unstable and unpredictable characteristics; [x] Clinical decision making of [x] low, [] moderate, [] high complexity using standardized patient assessment instrument and/or measurable assessment of functional outcome. [x] EVAL (LOW) 72731 (typically 30 minutes face-to-face)  [] EVAL (MOD) 18874 (typically 30 minutes face-to-face)  [] EVAL (HIGH) 30642 (typically 45 minutes face-to-face)  [] RE-EVAL     PLAN:   Frequency/Duration:  One time pre-op visit, will update at post-op visit:  Interventions:  [x]  Therapeutic exercise including: strength training, ROM, for Lower extremity and core   [x]  NMR activation and proprioception for LE, Glutes and Core   [x]  Manual therapy as indicated for LE, Hip and spine to include: Dry Needling/IASTM, STM, PROM, Gr I-IV mobilizations, manipulation. [x] Modalities as needed that may include: thermal agents, E-stim, Biofeedback, US, iontophoresis as indicated  [x] Patient education on joint protection, postural re-education, activity modification, progression of HEP. HEP instruction: Pt provided with written HEP instructions. Patient education:  Patient was thoroughly educated on this date regarding prehabilitation goals, importance of PT sessions in improving overall ROM, strength and stability prior to surgery, and how prehabilitation will facilitate improved post-operative outcomes. The patient was educated on and instructed in HEP as listed. The patient was given a detailed handout for exercises to initiate in the hospital post-operatively as well as at home. The discharge plan from the hospital was reviewed with the patient; specifically, to reduce length of hospital stay and to minimize time before reinitiating outpatient physical therapy after surgery. Education regarding early mobility post-operatively in the hospital and emphasis on working with both physical therapy and nursing staff to achieve ambulation goal of 150 feet was provided.  The patient was highly encouraged to attend joint class in hospital prior to surgery for further instructions on pre and post-surgical care. Also, education regarding goals and expectations was provided; specifically, knee flexion range of motion greater than or equal to 90 degrees and 0 degrees knee extension to promote improved gait mechanics and ambulation. The patient was educated about all applicable post-op restrictions and precautions. The patient was encouraged to utilize ice/cold pack after surgery to address pain, minimize swelling as often as possible. It is in my medical opinion that this patient is clear from all physical barriers prior to consideration for surgery, activity modifications prior to and post operatively have been discussed with this patient as well as discharge planning and is cleared for surgery from physical therapy perspective. GOALS:  Patient stated goal: dec pain when walking, reduce falls  [] Progressing: [] Met: [] Not Met: [] Adjusted    Therapist goals for Patient:   Short Term Goals: To be achieved in: 1 visit  1. Independent in HEP and progression per patient tolerance, in order to prevent re-injury. [] Progressing: [] Met: [] Not Met: [] Adjusted  2. Patient will have a decrease in pain to facilitate improvement in movement, function, and ADLs as indicated by Functional Deficits. [] Progressing: [] Met: [] Not Met: [] Adjusted  3. All patient questions regarding expectations for rehab following upcoming surgery are answered.    [] Progressing: [] Met: [] Not Met: [] Adjusted    Long Term Goals: long term goals to be determined at re-eval following upcoming surgery    Electronically signed by:  Jefe Peace, PT

## 2021-08-03 DIAGNOSIS — F41.9 ANXIETY: ICD-10-CM

## 2021-08-03 DIAGNOSIS — G47.09 OTHER INSOMNIA: ICD-10-CM

## 2021-08-03 NOTE — TELEPHONE ENCOUNTER
Medication:   Pending Prescriptions Disp Refills    ALPRAZolam (XANAX) 0.5 MG tablet 30 tablet 0      Last Filled:  7/1/2021    Patient Phone Number: [phone]     Last appt: 7/7/2021   Next appt: 8/10/2021    Last OARRS:   RX Monitoring 4/24/2019   Attestation The Prescription Monitoring Report for this patient was reviewed today. Periodic Controlled Substance Monitoring -   Chronic Pain > 50 MEDD Obtained or confirmened \"Consent of Opioid Use\" on file.      Last PDMP Caron Olguin as Reviewed [facility]):  Review User Review Instant Review Result   Tenzin Shuttramo 7/7/2021  8:34 AM Reviewed PDMP [1]     Preferred Pharmacy:   [address] [phone] - F [phone]

## 2021-08-04 ENCOUNTER — TELEPHONE (OUTPATIENT)
Dept: ORTHOPEDIC SURGERY | Age: 66
End: 2021-08-04

## 2021-08-04 RX ORDER — ALPRAZOLAM 0.5 MG/1
TABLET ORAL
Qty: 30 TABLET | Refills: 0 | Status: SHIPPED | OUTPATIENT
Start: 2021-08-04 | End: 2021-10-07 | Stop reason: SDUPTHER

## 2021-08-09 ENCOUNTER — HOSPITAL ENCOUNTER (OUTPATIENT)
Age: 66
Discharge: HOME | End: 2021-08-09
Payer: MEDICARE

## 2021-08-09 DIAGNOSIS — M17.11 PRIMARY OSTEOARTHRITIS OF RIGHT KNEE: ICD-10-CM

## 2021-08-09 DIAGNOSIS — Z01.818 PREOPERATIVE TESTING: ICD-10-CM

## 2021-08-09 LAB
A/G RATIO: 1.8 (ref 1.1–2.2)
ABO/RH: NORMAL
ALBUMIN SERPL-MCNC: 4.2 G/DL (ref 3.4–5)
ALP BLD-CCNC: 56 U/L (ref 40–129)
ALT SERPL-CCNC: 10 U/L (ref 10–40)
ANION GAP SERPL CALCULATED.3IONS-SCNC: 12 MMOL/L (ref 3–16)
ANTIBODY SCREEN: NORMAL
ANTIBODY SCREEN: NORMAL
APTT: 32.3 SEC (ref 26.2–38.6)
AST SERPL-CCNC: 17 U/L (ref 15–37)
BASOPHILS ABSOLUTE: 0.1 K/UL (ref 0–0.2)
BASOPHILS RELATIVE PERCENT: 0.7 %
BILIRUB SERPL-MCNC: 0.3 MG/DL (ref 0–1)
BILIRUBIN URINE: NEGATIVE
BLOOD, URINE: NEGATIVE
BUN BLDV-MCNC: 17 MG/DL (ref 7–20)
CALCIUM SERPL-MCNC: 9.4 MG/DL (ref 8.3–10.6)
CHLORIDE BLD-SCNC: 105 MMOL/L (ref 99–110)
CLARITY: CLEAR
CO2: 28 MMOL/L (ref 21–32)
COLOR: YELLOW
CREAT SERPL-MCNC: 0.6 MG/DL (ref 0.6–1.2)
EKG ATRIAL RATE: 62 BPM
EKG DIAGNOSIS: NORMAL
EKG P AXIS: 65 DEGREES
EKG P-R INTERVAL: 160 MS
EKG Q-T INTERVAL: 418 MS
EKG QRS DURATION: 80 MS
EKG QTC CALCULATION (BAZETT): 424 MS
EKG R AXIS: 67 DEGREES
EKG T AXIS: 73 DEGREES
EKG VENTRICULAR RATE: 62 BPM
EOSINOPHILS ABSOLUTE: 0.2 K/UL (ref 0–0.6)
EOSINOPHILS RELATIVE PERCENT: 2.8 %
GFR AFRICAN AMERICAN: >60
GFR NON-AFRICAN AMERICAN: >60
GLOBULIN: 2.3 G/DL
GLUCOSE BLD-MCNC: 93 MG/DL (ref 70–99)
GLUCOSE URINE: NEGATIVE MG/DL
HCT VFR BLD CALC: 41.3 % (ref 36–48)
HEMOGLOBIN: 14.3 G/DL (ref 12–16)
INR BLD: 0.89 (ref 0.88–1.12)
KETONES, URINE: NEGATIVE MG/DL
LEUKOCYTE ESTERASE, URINE: NEGATIVE
LYMPHOCYTES ABSOLUTE: 1.9 K/UL (ref 1–5.1)
LYMPHOCYTES RELATIVE PERCENT: 28.1 %
MCH RBC QN AUTO: 32.4 PG (ref 26–34)
MCHC RBC AUTO-ENTMCNC: 34.6 G/DL (ref 31–36)
MCV RBC AUTO: 93.6 FL (ref 80–100)
MICROSCOPIC EXAMINATION: NORMAL
MONOCYTES ABSOLUTE: 0.4 K/UL (ref 0–1.3)
MONOCYTES RELATIVE PERCENT: 6 %
NEUTROPHILS ABSOLUTE: 4.3 K/UL (ref 1.7–7.7)
NEUTROPHILS RELATIVE PERCENT: 62.4 %
NITRITE, URINE: NEGATIVE
PDW BLD-RTO: 13.6 % (ref 12.4–15.4)
PH UA: 7 (ref 5–8)
PLATELET # BLD: 174 K/UL (ref 135–450)
PMV BLD AUTO: 8.4 FL (ref 5–10.5)
POTASSIUM SERPL-SCNC: 3.7 MMOL/L (ref 3.5–5.1)
PROTEIN UA: NEGATIVE MG/DL
PROTHROMBIN TIME: 10 SEC (ref 9.9–12.7)
RBC # BLD: 4.41 M/UL (ref 4–5.2)
SODIUM BLD-SCNC: 145 MMOL/L (ref 136–145)
SPECIFIC GRAVITY UA: 1.01 (ref 1–1.03)
TOTAL PROTEIN: 6.5 G/DL (ref 6.4–8.2)
URINE REFLEX TO CULTURE: NORMAL
URINE TYPE: NORMAL
UROBILINOGEN, URINE: 0.2 E.U./DL
VITAMIN D 25-HYDROXY: 48.1 NG/ML
WBC # BLD: 6.9 K/UL (ref 4–11)

## 2021-08-09 PROCEDURE — 83036 HEMOGLOBIN GLYCOSYLATED A1C: CPT

## 2021-08-09 PROCEDURE — 36415 COLL VENOUS BLD VENIPUNCTURE: CPT

## 2021-08-09 PROCEDURE — 81003 URINALYSIS AUTO W/O SCOPE: CPT

## 2021-08-09 PROCEDURE — 93010 ELECTROCARDIOGRAM REPORT: CPT | Performed by: INTERNAL MEDICINE

## 2021-08-09 PROCEDURE — 86850 RBC ANTIBODY SCREEN: CPT

## 2021-08-09 PROCEDURE — 82306 VITAMIN D 25 HYDROXY: CPT

## 2021-08-09 PROCEDURE — 85610 PROTHROMBIN TIME: CPT

## 2021-08-09 PROCEDURE — 85730 THROMBOPLASTIN TIME PARTIAL: CPT

## 2021-08-09 PROCEDURE — 86901 BLOOD TYPING SEROLOGIC RH(D): CPT

## 2021-08-09 PROCEDURE — 93005 ELECTROCARDIOGRAM TRACING: CPT

## 2021-08-09 PROCEDURE — 86900 BLOOD TYPING SEROLOGIC ABO: CPT

## 2021-08-09 PROCEDURE — 85025 COMPLETE CBC W/AUTO DIFF WBC: CPT

## 2021-08-09 PROCEDURE — 80053 COMPREHEN METABOLIC PANEL: CPT

## 2021-08-09 PROCEDURE — 87081 CULTURE SCREEN ONLY: CPT

## 2021-08-09 NOTE — PROGRESS NOTES
Patient attended Joint Education class on 8/8/2021. Patient verified surgery for Total knee replacement and received patient information and educational Joint folder including education handouts on common medications, pre-operative checklist, and points to remember . Patient given handout instructions on Pre-operative Showering techniques and the use of anti-septic 5 days before surgery. Paper copy of powerpoint given to patient. Hibiclens bottle given to patient to take home. Patient aware they need to have labs, covid testing and H&P done before surgery. If they have had their covid vaccines, they have been asked to bring their immunization card on the day of surgery and we can waive their covid testing. We also discussed in depth about pain medication and we cannot refill prescriptions early due to state regulations. Answered all of patient's questions in class. Post-test score 5/5    DOS: 8/31/2021  Dr Lily Tom: outpatient. Wants op pt at [id number] [facility]. Needs rw. Has a 4 wheeled walker.        Patient registered in [address]  Orthopedic Navigator  [phone]

## 2021-08-10 ENCOUNTER — OFFICE VISIT (OUTPATIENT)
Dept: FAMILY MEDICINE CLINIC | Age: 66
End: 2021-08-10
Payer: MEDICARE

## 2021-08-10 ENCOUNTER — TELEPHONE (OUTPATIENT)
Dept: FAMILY MEDICINE CLINIC | Age: 66
End: 2021-08-10

## 2021-08-10 VITALS
OXYGEN SATURATION: 94 % | HEIGHT: 69 IN | HEART RATE: 63 BPM | SYSTOLIC BLOOD PRESSURE: 122 MMHG | BODY MASS INDEX: 22.72 KG/M2 | DIASTOLIC BLOOD PRESSURE: 82 MMHG | WEIGHT: 153.4 LBS | TEMPERATURE: 97.3 F

## 2021-08-10 DIAGNOSIS — M17.11 PRIMARY OSTEOARTHRITIS OF RIGHT KNEE: ICD-10-CM

## 2021-08-10 DIAGNOSIS — Z01.818 PREOP EXAMINATION: Primary | ICD-10-CM

## 2021-08-10 LAB
ESTIMATED AVERAGE GLUCOSE: 102.5 MG/DL
HBA1C MFR BLD: 5.2 %

## 2021-08-10 PROCEDURE — 99213 OFFICE O/P EST LOW 20 MIN: CPT | Performed by: NURSE PRACTITIONER

## 2021-08-10 NOTE — PROGRESS NOTES
EKG dated 8/9/21 reviewed by DR Boaz Parks wants patient to have medical clearance to proceed-patient was seen at PCP 8/10/21-EKG results addressed-no contraindication to surgery per office notes-I called office to ask that it be reviewed again and documented on

## 2021-08-10 NOTE — TELEPHONE ENCOUNTER
Kari De Paz from [facility] admissions [phone] is calling to double check about if the PT's EKG results and if the PT is clear for surgery?

## 2021-08-10 NOTE — PROGRESS NOTES
Preoperative Consultation    Theo Jackson  YOB: 1955    This patient presents to the office today for a preoperative consultation at the request of surgeon, Dr. Yaya Llamas, who plans on performing total right knee arthroplasty on August 31 at 0600.       Planned anesthesia: spinal block/twilight   Known anesthesia problems: None   Bleeding risk: No recent or remote history of abnormal bleeding  Personal or FH of DVT/PE: No      Patient Active Problem List   Diagnosis    Osteoarthritis    Back pain    Hypertension    Fibromyalgia    Hypoglycemia    Hearing problem of both ears    Macular degeneration    Cervical disc disease    Tobacco dependence    Other insomnia    Anxiety    Pain medication agreement    Chronic, continuous use of opioids    Polyarthropathy, multiple sites    Other spondylosis, lumbar region    Mixed hyperlipidemia    Bilateral primary osteoarthritis of knee     Past Surgical History:   Procedure Laterality Date    APPENDECTOMY  02/13/2019    [facility] INJECTION PROCEDURE FOR SACROILIAC JOINT Right 08/31/2020    RIGHT SACROILIAC JOINT INJECTION AND RIGHT GREATER TROCHANTERIC BURSA INJECTION WITH FLUOROSCOPY (55323, [id number]) performed by [facility] at [facility]    KNEE ARTHROSCOPY Right 2009    KNEE ARTHROSCOPY Left     PAIN MANAGEMENT PROCEDURE Right 02/12/2020    RIGHT L4 AND L5 TRANSFORAMINAL EPIDURAL STEROID INJECTION WITH FLUOROSCOPY ([id number], [id number]) performed by Je George MD at [address] Right 12/02/2020    RIGHT L4 AND L5 TRANSFORAMINAL EPIDURAL STEROID INJECTION WITH FLUOROSCOPY performed by Je George MD at [address]   Allergen Reactions    Darvocet [Propoxyphene N-Acetaminophen] Hives    Lipitor [Atorvastatin]      Leg cramping    Diclofenac Rash     Outpatient Medications Marked as Taking for the 8/10/21 encounter (Office Visit) with Toby Ackerman Neck supple. No JVD present. Carotid bruit is not present. No mass and no thyromegaly present. Cardiovascular: Normal rate, regular rhythm, normal heart sounds and intact distal pulses. Exam reveals no gallop and no friction rub. No murmur heard. Pulmonary/Chest: Effort normal and breath sounds normal. No respiratory distress. She has no wheezes. She has no rales. Abdominal: Soft. Normal aorta and bowel sounds are normal. She exhibits no distension and no mass. There is no hepatosplenomegaly. No tenderness. Musculoskeletal: She exhibits no edema, bilateral knee tenderness, limited ROM   Neurological: She is alert and oriented to person, place, and time. She has normal strength. No cranial nerve deficit or sensory deficit. Coordination and gait normal.   Skin: Skin is warm and dry. No rash noted. No erythema. EKG Interpretation:  Completed 8/9/21 . Sinus rhythm with marked sinus arrhythmiaT wave abnormality, consider anterior ischemia Abnormal ECGNo previous ECGs availableConfirmed by [facility] JUST[facility] JERONIMO     Lab Review Yes       Assessment:       Randa was seen today for pre-op exam.    Diagnoses and all orders for this visit:    Preop examination    Primary osteoarthritis of right knee      78 y.o. patient  approved for Surgery         Plan:     1. Preoperative workup as follows: none  2. Change in medication regimen before surgery: Hold all medications on morning of surgery, hold ibuprofen 5 days prior to surgery  3. No contraindications to planned surgery    Electronically signed by PLACIDO Tong NP on 8/10/21 at 7:48 AM EDT     This dictation was generated by voice recognition computer software. Although all attempts are made to edit the dictation for accuracy, there may be errors in the transcription that are not intended.

## 2021-08-10 NOTE — TELEPHONE ENCOUNTER
I called Hortensia back at the number below and there was no VMB and no one answered the ph. I was calling to inform that pt had her EKG done at the hospital on 8/9/21 with [facility] MD, [address]

## 2021-08-11 LAB — MRSA CULTURE ONLY: NORMAL

## 2021-08-11 NOTE — TELEPHONE ENCOUNTER
Cleared for surgery. Pt with no significant cardiac history, BP and cholesterol well controlled. Please call.

## 2021-08-11 NOTE — TELEPHONE ENCOUNTER
Hortensia from [facility] per admissions called back. I spoke with Camilla Geronimo and she still needs DARNELL Hutson to clear the pt on her EKG for surgery, since the EKG was abnormal.     Camilla Geronimo # [phone]

## 2021-08-29 DIAGNOSIS — M13.0 POLYARTHROPATHY, MULTIPLE SITES: ICD-10-CM

## 2021-08-30 RX ORDER — GABAPENTIN 300 MG/1
CAPSULE ORAL
Qty: 180 CAPSULE | Refills: 1 | Status: SHIPPED | OUTPATIENT
Start: 2021-08-30 | End: 2021-12-23

## 2021-08-31 ENCOUNTER — ANESTHESIA EVENT (OUTPATIENT)
Dept: OPERATING ROOM | Age: 66
End: 2021-08-31
Payer: MEDICARE

## 2021-08-31 ENCOUNTER — ANESTHESIA (OUTPATIENT)
Dept: OPERATING ROOM | Age: 66
End: 2021-08-31
Payer: MEDICARE

## 2021-08-31 ENCOUNTER — APPOINTMENT (OUTPATIENT)
Dept: GENERAL RADIOLOGY | Age: 66
End: 2021-08-31
Attending: ORTHOPAEDIC SURGERY
Payer: MEDICARE

## 2021-08-31 ENCOUNTER — HOSPITAL ENCOUNTER (OUTPATIENT)
Age: 66
Setting detail: OUTPATIENT SURGERY
Discharge: HOME | End: 2021-08-31
Attending: ORTHOPAEDIC SURGERY | Admitting: ORTHOPAEDIC SURGERY
Payer: MEDICARE

## 2021-08-31 VITALS
RESPIRATION RATE: 14 BRPM | HEART RATE: 75 BPM | TEMPERATURE: 97 F | HEIGHT: 69 IN | OXYGEN SATURATION: 92 % | WEIGHT: 142.05 LBS | BODY MASS INDEX: 21.04 KG/M2 | DIASTOLIC BLOOD PRESSURE: 82 MMHG | SYSTOLIC BLOOD PRESSURE: 140 MMHG

## 2021-08-31 VITALS
TEMPERATURE: 96.6 F | OXYGEN SATURATION: 97 % | RESPIRATION RATE: 5 BRPM | DIASTOLIC BLOOD PRESSURE: 67 MMHG | SYSTOLIC BLOOD PRESSURE: 120 MMHG

## 2021-08-31 DIAGNOSIS — M17.11 PRIMARY OSTEOARTHRITIS OF RIGHT KNEE: Primary | ICD-10-CM

## 2021-08-31 LAB
ABO/RH: NORMAL
ANTIBODY SCREEN: NORMAL
ANTIBODY SCREEN: NORMAL
GLUCOSE BLD-MCNC: 100 MG/DL (ref 70–99)
GLUCOSE BLD-MCNC: 108 MG/DL (ref 70–99)
PERFORMED ON: ABNORMAL
PERFORMED ON: ABNORMAL

## 2021-08-31 PROCEDURE — 64447 NJX AA&/STRD FEMORAL NRV IMG: CPT | Performed by: ANESTHESIOLOGY

## 2021-08-31 PROCEDURE — 20985 CPTR-ASST DIR MS PX: CPT | Performed by: ORTHOPAEDIC SURGERY

## 2021-08-31 PROCEDURE — 97535 SELF CARE MNGMENT TRAINING: CPT

## 2021-08-31 PROCEDURE — 97165 OT EVAL LOW COMPLEX 30 MIN: CPT

## 2021-08-31 PROCEDURE — 27447 TOTAL KNEE ARTHROPLASTY: CPT | Performed by: PHYSICIAN ASSISTANT

## 2021-08-31 PROCEDURE — 3600000014 HC SURGERY LEVEL 4 ADDTL 15MIN: Performed by: ORTHOPAEDIC SURGERY

## 2021-08-31 PROCEDURE — 86850 RBC ANTIBODY SCREEN: CPT

## 2021-08-31 PROCEDURE — 6360000002 HC RX W HCPCS: Performed by: ORTHOPAEDIC SURGERY

## 2021-08-31 PROCEDURE — 86901 BLOOD TYPING SEROLOGIC RH(D): CPT

## 2021-08-31 PROCEDURE — 97116 GAIT TRAINING THERAPY: CPT

## 2021-08-31 PROCEDURE — 7100000010 HC PHASE II RECOVERY - FIRST 15 MIN: Performed by: ORTHOPAEDIC SURGERY

## 2021-08-31 PROCEDURE — 2720000010 HC SURG SUPPLY STERILE: Performed by: ORTHOPAEDIC SURGERY

## 2021-08-31 PROCEDURE — 7100000000 HC PACU RECOVERY - FIRST 15 MIN: Performed by: ORTHOPAEDIC SURGERY

## 2021-08-31 PROCEDURE — 73560 X-RAY EXAM OF KNEE 1 OR 2: CPT

## 2021-08-31 PROCEDURE — 7100000011 HC PHASE II RECOVERY - ADDTL 15 MIN: Performed by: ORTHOPAEDIC SURGERY

## 2021-08-31 PROCEDURE — 2709999900 HC NON-CHARGEABLE SUPPLY: Performed by: ORTHOPAEDIC SURGERY

## 2021-08-31 PROCEDURE — C1776 JOINT DEVICE (IMPLANTABLE): HCPCS | Performed by: ORTHOPAEDIC SURGERY

## 2021-08-31 PROCEDURE — 99024 POSTOP FOLLOW-UP VISIT: CPT | Performed by: NURSE PRACTITIONER

## 2021-08-31 PROCEDURE — 3700000001 HC ADD 15 MINUTES (ANESTHESIA): Performed by: ORTHOPAEDIC SURGERY

## 2021-08-31 PROCEDURE — 2500000003 HC RX 250 WO HCPCS: Performed by: ORTHOPAEDIC SURGERY

## 2021-08-31 PROCEDURE — 7100000001 HC PACU RECOVERY - ADDTL 15 MIN: Performed by: ORTHOPAEDIC SURGERY

## 2021-08-31 PROCEDURE — 3700000000 HC ANESTHESIA ATTENDED CARE: Performed by: ORTHOPAEDIC SURGERY

## 2021-08-31 PROCEDURE — 36415 COLL VENOUS BLD VENIPUNCTURE: CPT

## 2021-08-31 PROCEDURE — 2500000003 HC RX 250 WO HCPCS: Performed by: NURSE ANESTHETIST, CERTIFIED REGISTERED

## 2021-08-31 PROCEDURE — 2580000003 HC RX 258: Performed by: ORTHOPAEDIC SURGERY

## 2021-08-31 PROCEDURE — 6360000002 HC RX W HCPCS: Performed by: PHYSICIAN ASSISTANT

## 2021-08-31 PROCEDURE — 3600000004 HC SURGERY LEVEL 4 BASE: Performed by: ORTHOPAEDIC SURGERY

## 2021-08-31 PROCEDURE — 6370000000 HC RX 637 (ALT 250 FOR IP): Performed by: ORTHOPAEDIC SURGERY

## 2021-08-31 PROCEDURE — 2500000003 HC RX 250 WO HCPCS: Performed by: ANESTHESIOLOGY

## 2021-08-31 PROCEDURE — APPNB45 APP NON BILLABLE 31-45 MINUTES: Performed by: NURSE PRACTITIONER

## 2021-08-31 PROCEDURE — 6360000002 HC RX W HCPCS: Performed by: ANESTHESIOLOGY

## 2021-08-31 PROCEDURE — 27447 TOTAL KNEE ARTHROPLASTY: CPT | Performed by: ORTHOPAEDIC SURGERY

## 2021-08-31 PROCEDURE — 97161 PT EVAL LOW COMPLEX 20 MIN: CPT

## 2021-08-31 PROCEDURE — 2580000003 HC RX 258: Performed by: NURSE ANESTHETIST, CERTIFIED REGISTERED

## 2021-08-31 PROCEDURE — 6360000002 HC RX W HCPCS: Performed by: NURSE ANESTHETIST, CERTIFIED REGISTERED

## 2021-08-31 PROCEDURE — 86900 BLOOD TYPING SEROLOGIC ABO: CPT

## 2021-08-31 PROCEDURE — C1713 ANCHOR/SCREW BN/BN,TIS/BN: HCPCS | Performed by: ORTHOPAEDIC SURGERY

## 2021-08-31 DEVICE — RALLY HV BONE CEMENT 40 GRAMS
Type: IMPLANTABLE DEVICE | Site: KNEE | Status: FUNCTIONAL
Brand: RALLY

## 2021-08-31 DEVICE — JOURNEY TIBIAL BASEPLATE NONPOROUS                                    RIGHT SIZE 5
Type: IMPLANTABLE DEVICE | Site: KNEE | Status: FUNCTIONAL
Brand: JOURNEY

## 2021-08-31 DEVICE — JOURNEY BCS PATELLA RESURFACING                                    ROUND 32 MM STANDARD
Type: IMPLANTABLE DEVICE | Site: KNEE | Status: FUNCTIONAL
Brand: JOURNEY

## 2021-08-31 DEVICE — JOURNEY II BCS XLPE ARTICULAR                                    INSERT SIZE 5-6 RIGHT 10MM
Type: IMPLANTABLE DEVICE | Site: KNEE | Status: FUNCTIONAL
Brand: JOURNEY

## 2021-08-31 DEVICE — JOURNEY II BCS FEMORAL OXINIUM                                    RIGHT SIZE 6
Type: IMPLANTABLE DEVICE | Site: KNEE | Status: FUNCTIONAL
Brand: JOURNEY

## 2021-08-31 RX ORDER — ONDANSETRON 2 MG/ML
4 INJECTION INTRAMUSCULAR; INTRAVENOUS
Status: DISCONTINUED | OUTPATIENT
Start: 2021-08-31 | End: 2021-08-31 | Stop reason: HOSPADM

## 2021-08-31 RX ORDER — BUPIVACAINE HYDROCHLORIDE 7.5 MG/ML
INJECTION, SOLUTION INTRASPINAL PRN
Status: DISCONTINUED | OUTPATIENT
Start: 2021-08-31 | End: 2021-08-31 | Stop reason: SDUPTHER

## 2021-08-31 RX ORDER — PROPOFOL 10 MG/ML
INJECTION, EMULSION INTRAVENOUS PRN
Status: DISCONTINUED | OUTPATIENT
Start: 2021-08-31 | End: 2021-08-31 | Stop reason: SDUPTHER

## 2021-08-31 RX ORDER — OXYCODONE HYDROCHLORIDE AND ACETAMINOPHEN 5; 325 MG/1; MG/1
1-2 TABLET ORAL EVERY 4 HOURS PRN
Qty: 50 TABLET | Refills: 0 | Status: SHIPPED | OUTPATIENT
Start: 2021-08-31 | End: 2021-09-07 | Stop reason: SDUPTHER

## 2021-08-31 RX ORDER — HYDRALAZINE HYDROCHLORIDE 20 MG/ML
5 INJECTION INTRAMUSCULAR; INTRAVENOUS EVERY 10 MIN PRN
Status: DISCONTINUED | OUTPATIENT
Start: 2021-08-31 | End: 2021-08-31 | Stop reason: HOSPADM

## 2021-08-31 RX ORDER — MEPERIDINE HYDROCHLORIDE 25 MG/ML
12.5 INJECTION INTRAMUSCULAR; INTRAVENOUS; SUBCUTANEOUS EVERY 5 MIN PRN
Status: DISCONTINUED | OUTPATIENT
Start: 2021-08-31 | End: 2021-08-31 | Stop reason: HOSPADM

## 2021-08-31 RX ORDER — MIDAZOLAM HYDROCHLORIDE 2 MG/2ML
2 INJECTION, SOLUTION INTRAMUSCULAR; INTRAVENOUS ONCE
Status: COMPLETED | OUTPATIENT
Start: 2021-08-31 | End: 2021-08-31

## 2021-08-31 RX ORDER — LIDOCAINE HYDROCHLORIDE 10 MG/ML
1 INJECTION, SOLUTION EPIDURAL; INFILTRATION; INTRACAUDAL; PERINEURAL
Status: DISCONTINUED | OUTPATIENT
Start: 2021-08-31 | End: 2021-08-31 | Stop reason: HOSPADM

## 2021-08-31 RX ORDER — HYDROMORPHONE HCL 110MG/55ML
PATIENT CONTROLLED ANALGESIA SYRINGE INTRAVENOUS PRN
Status: DISCONTINUED | OUTPATIENT
Start: 2021-08-31 | End: 2021-08-31 | Stop reason: SDUPTHER

## 2021-08-31 RX ORDER — GLYCOPYRROLATE 1 MG/5 ML
SYRINGE (ML) INTRAVENOUS PRN
Status: DISCONTINUED | OUTPATIENT
Start: 2021-08-31 | End: 2021-08-31 | Stop reason: SDUPTHER

## 2021-08-31 RX ORDER — BUPIVACAINE HYDROCHLORIDE 5 MG/ML
INJECTION, SOLUTION EPIDURAL; INTRACAUDAL
Status: COMPLETED | OUTPATIENT
Start: 2021-08-31 | End: 2021-08-31

## 2021-08-31 RX ORDER — CELECOXIB 200 MG/1
400 CAPSULE ORAL ONCE
Status: COMPLETED | OUTPATIENT
Start: 2021-08-31 | End: 2021-08-31

## 2021-08-31 RX ORDER — DEXAMETHASONE SODIUM PHOSPHATE 4 MG/ML
INJECTION, SOLUTION INTRA-ARTICULAR; INTRALESIONAL; INTRAMUSCULAR; INTRAVENOUS; SOFT TISSUE PRN
Status: DISCONTINUED | OUTPATIENT
Start: 2021-08-31 | End: 2021-08-31 | Stop reason: SDUPTHER

## 2021-08-31 RX ORDER — EPHEDRINE SULFATE/0.9% NACL/PF 50 MG/5 ML
SYRINGE (ML) INTRAVENOUS PRN
Status: DISCONTINUED | OUTPATIENT
Start: 2021-08-31 | End: 2021-08-31 | Stop reason: SDUPTHER

## 2021-08-31 RX ORDER — KETAMINE HCL IN NACL, ISO-OSM 100MG/10ML
SYRINGE (ML) INJECTION PRN
Status: DISCONTINUED | OUTPATIENT
Start: 2021-08-31 | End: 2021-08-31 | Stop reason: SDUPTHER

## 2021-08-31 RX ORDER — LIDOCAINE HYDROCHLORIDE 10 MG/ML
0.5 INJECTION, SOLUTION EPIDURAL; INFILTRATION; INTRACAUDAL; PERINEURAL ONCE
Status: DISCONTINUED | OUTPATIENT
Start: 2021-08-31 | End: 2021-08-31 | Stop reason: HOSPADM

## 2021-08-31 RX ORDER — MAGNESIUM HYDROXIDE 1200 MG/15ML
LIQUID ORAL
Status: COMPLETED | OUTPATIENT
Start: 2021-08-31 | End: 2021-08-31

## 2021-08-31 RX ORDER — BUPIVACAINE HYDROCHLORIDE AND EPINEPHRINE 2.5; 5 MG/ML; UG/ML
INJECTION, SOLUTION EPIDURAL; INFILTRATION; INTRACAUDAL; PERINEURAL
Status: COMPLETED | OUTPATIENT
Start: 2021-08-31 | End: 2021-08-31

## 2021-08-31 RX ORDER — HYDROMORPHONE HCL 110MG/55ML
0.5 PATIENT CONTROLLED ANALGESIA SYRINGE INTRAVENOUS EVERY 5 MIN PRN
Status: DISCONTINUED | OUTPATIENT
Start: 2021-08-31 | End: 2021-08-31 | Stop reason: HOSPADM

## 2021-08-31 RX ORDER — LIDOCAINE HYDROCHLORIDE 20 MG/ML
INJECTION, SOLUTION EPIDURAL; INFILTRATION; INTRACAUDAL; PERINEURAL PRN
Status: DISCONTINUED | OUTPATIENT
Start: 2021-08-31 | End: 2021-08-31 | Stop reason: SDUPTHER

## 2021-08-31 RX ORDER — SODIUM CHLORIDE 0.9 % (FLUSH) 0.9 %
10 SYRINGE (ML) INJECTION EVERY 12 HOURS SCHEDULED
Status: DISCONTINUED | OUTPATIENT
Start: 2021-08-31 | End: 2021-08-31 | Stop reason: HOSPADM

## 2021-08-31 RX ORDER — ONDANSETRON 2 MG/ML
INJECTION INTRAMUSCULAR; INTRAVENOUS PRN
Status: DISCONTINUED | OUTPATIENT
Start: 2021-08-31 | End: 2021-08-31 | Stop reason: SDUPTHER

## 2021-08-31 RX ORDER — PHENYLEPHRINE HCL IN 0.9% NACL 1 MG/10 ML
SYRINGE (ML) INTRAVENOUS PRN
Status: DISCONTINUED | OUTPATIENT
Start: 2021-08-31 | End: 2021-08-31 | Stop reason: SDUPTHER

## 2021-08-31 RX ORDER — SODIUM CHLORIDE 0.9 % (FLUSH) 0.9 %
10 SYRINGE (ML) INJECTION PRN
Status: DISCONTINUED | OUTPATIENT
Start: 2021-08-31 | End: 2021-08-31 | Stop reason: HOSPADM

## 2021-08-31 RX ORDER — ASPIRIN 325 MG
325 TABLET, DELAYED RELEASE (ENTERIC COATED) ORAL 2 TIMES DAILY
Qty: 28 TABLET | Refills: 0 | Status: SHIPPED | OUTPATIENT
Start: 2021-08-31 | End: 2021-10-06 | Stop reason: ALTCHOICE

## 2021-08-31 RX ORDER — SODIUM CHLORIDE, SODIUM LACTATE, POTASSIUM CHLORIDE, CALCIUM CHLORIDE 600; 310; 30; 20 MG/100ML; MG/100ML; MG/100ML; MG/100ML
INJECTION, SOLUTION INTRAVENOUS CONTINUOUS
Status: DISCONTINUED | OUTPATIENT
Start: 2021-08-31 | End: 2021-08-31 | Stop reason: HOSPADM

## 2021-08-31 RX ORDER — VANCOMYCIN HYDROCHLORIDE 1 G/20ML
INJECTION, POWDER, LYOPHILIZED, FOR SOLUTION INTRAVENOUS
Status: COMPLETED | OUTPATIENT
Start: 2021-08-31 | End: 2021-08-31

## 2021-08-31 RX ORDER — SODIUM CHLORIDE, SODIUM LACTATE, POTASSIUM CHLORIDE, CALCIUM CHLORIDE 600; 310; 30; 20 MG/100ML; MG/100ML; MG/100ML; MG/100ML
INJECTION, SOLUTION INTRAVENOUS CONTINUOUS PRN
Status: DISCONTINUED | OUTPATIENT
Start: 2021-08-31 | End: 2021-08-31 | Stop reason: SDUPTHER

## 2021-08-31 RX ORDER — SODIUM CHLORIDE 9 MG/ML
25 INJECTION, SOLUTION INTRAVENOUS PRN
Status: DISCONTINUED | OUTPATIENT
Start: 2021-08-31 | End: 2021-08-31 | Stop reason: HOSPADM

## 2021-08-31 RX ORDER — CEFAZOLIN SODIUM 1 G/50ML
2000 INJECTION, SOLUTION INTRAVENOUS ONCE
Status: DISCONTINUED | OUTPATIENT
Start: 2021-08-31 | End: 2021-08-31 | Stop reason: DRUGHIGH

## 2021-08-31 RX ORDER — LABETALOL HYDROCHLORIDE 5 MG/ML
5 INJECTION, SOLUTION INTRAVENOUS EVERY 10 MIN PRN
Status: DISCONTINUED | OUTPATIENT
Start: 2021-08-31 | End: 2021-08-31 | Stop reason: HOSPADM

## 2021-08-31 RX ADMIN — LIDOCAINE HYDROCHLORIDE 60 MG: 20 INJECTION, SOLUTION EPIDURAL; INFILTRATION; INTRACAUDAL; PERINEURAL at 07:37

## 2021-08-31 RX ADMIN — Medication 100 MCG: at 08:58

## 2021-08-31 RX ADMIN — BUPIVACAINE HYDROCHLORIDE 10.5 MG: 7.5 INJECTION, SOLUTION INTRASPINAL at 07:28

## 2021-08-31 RX ADMIN — SODIUM CHLORIDE, POTASSIUM CHLORIDE, SODIUM LACTATE AND CALCIUM CHLORIDE: 600; 310; 30; 20 INJECTION, SOLUTION INTRAVENOUS at 08:52

## 2021-08-31 RX ADMIN — BUPIVACAINE HYDROCHLORIDE 20 ML: 5 INJECTION, SOLUTION EPIDURAL; INTRACAUDAL at 06:59

## 2021-08-31 RX ADMIN — Medication 100 MCG: at 07:53

## 2021-08-31 RX ADMIN — Medication 100 MCG: at 08:33

## 2021-08-31 RX ADMIN — DEXAMETHASONE SODIUM PHOSPHATE 8 MG: 4 INJECTION, SOLUTION INTRAMUSCULAR; INTRAVENOUS at 08:28

## 2021-08-31 RX ADMIN — HYDROMORPHONE HYDROCHLORIDE 0.5 MG: 2 INJECTION, SOLUTION INTRAMUSCULAR; INTRAVENOUS; SUBCUTANEOUS at 08:25

## 2021-08-31 RX ADMIN — TRANEXAMIC ACID 1000 MG: 1 INJECTION, SOLUTION INTRAVENOUS at 07:10

## 2021-08-31 RX ADMIN — Medication 10 MG: at 08:49

## 2021-08-31 RX ADMIN — CEFAZOLIN SODIUM 2000 MG: 10 INJECTION, POWDER, FOR SOLUTION INTRAVENOUS at 12:45

## 2021-08-31 RX ADMIN — MIDAZOLAM 2 MG: 1 INJECTION INTRAMUSCULAR; INTRAVENOUS at 06:54

## 2021-08-31 RX ADMIN — Medication 10 MG: at 07:57

## 2021-08-31 RX ADMIN — Medication 10 MG: at 08:04

## 2021-08-31 RX ADMIN — TRANEXAMIC ACID 1000 MG: 1 INJECTION, SOLUTION INTRAVENOUS at 08:48

## 2021-08-31 RX ADMIN — Medication 10 MG: at 08:58

## 2021-08-31 RX ADMIN — SODIUM CHLORIDE, POTASSIUM CHLORIDE, SODIUM LACTATE AND CALCIUM CHLORIDE: 600; 310; 30; 20 INJECTION, SOLUTION INTRAVENOUS at 07:11

## 2021-08-31 RX ADMIN — Medication 100 MCG: at 07:48

## 2021-08-31 RX ADMIN — PROPOFOL 50 MG: 10 INJECTION, EMULSION INTRAVENOUS at 07:31

## 2021-08-31 RX ADMIN — PROPOFOL 100 MG: 10 INJECTION, EMULSION INTRAVENOUS at 07:37

## 2021-08-31 RX ADMIN — CELECOXIB 400 MG: 200 CAPSULE ORAL at 06:42

## 2021-08-31 RX ADMIN — CEFAZOLIN 2000 MG: 10 INJECTION, POWDER, FOR SOLUTION INTRAVENOUS at 07:25

## 2021-08-31 RX ADMIN — Medication 100 MCG: at 08:49

## 2021-08-31 RX ADMIN — Medication 30 MG: at 07:40

## 2021-08-31 RX ADMIN — Medication 0.2 MG: at 07:44

## 2021-08-31 RX ADMIN — Medication 10 MG: at 08:38

## 2021-08-31 RX ADMIN — ONDANSETRON 4 MG: 2 INJECTION INTRAMUSCULAR; INTRAVENOUS at 09:23

## 2021-08-31 RX ADMIN — Medication 100 MCG: at 08:04

## 2021-08-31 ASSESSMENT — PULMONARY FUNCTION TESTS
PIF_VALUE: 0
PIF_VALUE: 3
PIF_VALUE: 2
PIF_VALUE: 3
PIF_VALUE: 0
PIF_VALUE: 8
PIF_VALUE: 3
PIF_VALUE: 2
PIF_VALUE: 3
PIF_VALUE: 0
PIF_VALUE: 2
PIF_VALUE: 3
PIF_VALUE: 2
PIF_VALUE: 2
PIF_VALUE: 0
PIF_VALUE: 2
PIF_VALUE: 2
PIF_VALUE: 3
PIF_VALUE: 1
PIF_VALUE: 0
PIF_VALUE: 4
PIF_VALUE: 3
PIF_VALUE: 2
PIF_VALUE: 3
PIF_VALUE: 3
PIF_VALUE: 0
PIF_VALUE: 3
PIF_VALUE: 2
PIF_VALUE: 3
PIF_VALUE: 0
PIF_VALUE: 3
PIF_VALUE: 0
PIF_VALUE: 2
PIF_VALUE: 3
PIF_VALUE: 2
PIF_VALUE: 2
PIF_VALUE: 0
PIF_VALUE: 2
PIF_VALUE: 1
PIF_VALUE: 6
PIF_VALUE: 4
PIF_VALUE: 3
PIF_VALUE: 0
PIF_VALUE: 2
PIF_VALUE: 2
PIF_VALUE: 3
PIF_VALUE: 2
PIF_VALUE: 3
PIF_VALUE: 3
PIF_VALUE: 0
PIF_VALUE: 1
PIF_VALUE: 3
PIF_VALUE: 9
PIF_VALUE: 2
PIF_VALUE: 3
PIF_VALUE: 0
PIF_VALUE: 3
PIF_VALUE: 2
PIF_VALUE: 3
PIF_VALUE: 3
PIF_VALUE: 0
PIF_VALUE: 2
PIF_VALUE: 0
PIF_VALUE: 4
PIF_VALUE: 2
PIF_VALUE: 3
PIF_VALUE: 16
PIF_VALUE: 3
PIF_VALUE: 2
PIF_VALUE: 3
PIF_VALUE: 2
PIF_VALUE: 3
PIF_VALUE: 2
PIF_VALUE: 3
PIF_VALUE: 0
PIF_VALUE: 2
PIF_VALUE: 2
PIF_VALUE: 3
PIF_VALUE: 2
PIF_VALUE: 3
PIF_VALUE: 1
PIF_VALUE: 3
PIF_VALUE: 0
PIF_VALUE: 0
PIF_VALUE: 2
PIF_VALUE: 3
PIF_VALUE: 0
PIF_VALUE: 2
PIF_VALUE: 3
PIF_VALUE: 0
PIF_VALUE: 3
PIF_VALUE: 3
PIF_VALUE: 2
PIF_VALUE: 2
PIF_VALUE: 3

## 2021-08-31 ASSESSMENT — PAIN SCALES - GENERAL
PAINLEVEL_OUTOF10: 0
PAINLEVEL_OUTOF10: 0

## 2021-08-31 ASSESSMENT — PAIN - FUNCTIONAL ASSESSMENT: PAIN_FUNCTIONAL_ASSESSMENT: 0-10

## 2021-08-31 ASSESSMENT — LIFESTYLE VARIABLES: SMOKING_STATUS: 1

## 2021-08-31 NOTE — PROGRESS NOTES
Occupational Therapy   Occupational Therapy Initial Assessment/Discharge  Date: 2021   Patient Name: Dean Vidal  MRN: [id number]     : 1955    Date of Service: 2021    Discharge Recommendations: Dean Vidal scored a 21/24 on the AM-PAC ADL Inpatient form. At this time, no further OT is recommended upon discharge due to patient near [facility] and will have 24 hr assist from  and daughter. Recommend patient returns to prior setting with prior services. OT Equipment Recommendations  Equipment Needed: No    Assessment   Assessment: Pt near PLOF due to R TKA and will have 24/7 assist from  and daughter. No skilled OT services needed at this time. Prognosis: Good  Decision Making: Low Complexity  OT Education: OT Role;Plan of Care;Transfer Training;ADL Adaptive Strategies;Precautions  Patient Education: Educated pt on OT role, POC, d/c plan, precautions, ADL adaptive strategies (don R side first), transfer training (to/from toilet and getting in to/out of tub/shower unit). Pt is an independent learner. REQUIRES OT FOLLOW UP: No  Activity Tolerance  Activity Tolerance: Patient Tolerated treatment well  Safety Devices  Safety Devices in place: Yes  Type of devices: All fall risk precautions in place; Left in bed;Nurse notified;Gait belt;Patient at risk for falls           Patient Diagnosis(es): The encounter diagnosis was Primary osteoarthritis of right knee. has a past medical history of Anxiety, Fibromyalgia, Gout, Hyperlipidemia, Hypertension, and Osteoarthritis. has a past surgical history that includes Hysterectomy; Knee arthroscopy (Right, ); Inner ear surgery; Appendectomy (2019); Pain management procedure (Right, 2020); Injection Procedure For Sacroiliac Joint (Right, 2020); Pain management procedure (Right, 2020); and Knee arthroscopy (Left).            Restrictions  Restrictions/Precautions  Restrictions/Precautions: Weight Bearing, Fall Risk (high fall risk)  Required Braces or Orthoses?: No  Lower Extremity Weight Bearing Restrictions  Right Lower Extremity Weight Bearing: Weight Bearing As Tolerated  Position Activity Restriction  Other position/activity restrictions: S/p RIGHT Total Knee Arthroplasty (8/31/21    Subjective   General  Chart Reviewed: Yes  Patient assessed for rehabilitation services?: Yes  Additional Pertinent Hx: anxiety, fibromyalgia, HTN, L4-L5 pain management procedure, SI injection procedure, OA  Family / Caregiver Present: No  Subjective  Subjective: Pt lying supine in bed, agreeable to therapy and denies pain. Patient Currently in Pain: Denies    Social/Functional History  Lives With: Spouse (2 yr old grandson; daughter plans on staying for 2 weeks as needed)  Type of Home: House  Home Layout: One level, Laundry in basement  Home Access: Stairs to enter without rails  Entrance Stairs - Number of Steps: 2 MIGUELITO, 12 MIGUELITO basement railings on L and R side but not simultaneously  Bathroom Shower/Tub: Tub/Shower unit, Shower chair without back, Curtain  Bathroom Toilet: Standard  Bathroom Equipment: Hand-held shower  Bathroom Accessibility: Walker accessible  Home Equipment: 4 wheeled walker, Reacher  ADL Assistance: Independent  Homemaking Assistance: Independent  Homemaking Responsibilities: Yes  Ambulation Assistance: Independent  Transfer Assistance: Independent  Active : Yes  Occupation: Full time employment  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  Hearing Exceptions: Hard of hearing/hearing concerns; No hearing aid    Orientation  Overall Orientation Status: Within Normal Limits  Observation/Palpation  Observation: RLE wrapped in ace bandage  Balance  Sitting Balance: Supervision  Standing Balance: Contact guard assistance (with RW)  Standing Balance  Time: ~10 min  Activity: sit<>stand, ADL tasks, ambulate ~80 ft total, stair training  Comment: 2x LOB, able to right self first time when ambulating but required Jair due to decreased quad contraction to right self on stairs  Functional Mobility  Functional - Mobility Device: Rolling Walker  Activity: To/from bathroom  Assist Level: Contact guard assistance  Functional Mobility Comments: required frequent cues for RW management and sequencing of steps  Toilet Transfers  Toilet - Technique: Ambulating (with RW)  Equipment Used: Standard toilet  Toilet Transfer: Contact guard assistance  Toilet Transfers Comments: used R grab bar to complete transfer due to at home will have sink/cabinet on R side to assist; cued for hand placement and RW management  ADL  Grooming: Contact guard assistance (washed hands at sink in stance)  UE Dressing: Supervision (donned bra and shirt seated EOB)  LE Dressing: Stand by assistance (donned underwear and pants seated EOB, educated on donning RLE first and how to don PAUL hose, pt v/u)  Toileting: Contact guard assistance (standard toilet)  Tone RUE  RUE Tone: Normotonic  Tone LUE  LUE Tone: Normotonic  Coordination  Movements Are Fluid And Coordinated: Yes     Bed mobility  Supine to Sit: Supervision  Sit to Supine: Supervision  Scooting: Independent  Comment: HOB flat  Transfers  Sit to stand: Contact guard assistance (with RW)  Stand to sit: Contact guard assistance (with RW)  Transfer Comments: frequent cues for hand placement and RW management     Cognition  Overall Cognitive Status: WFL        Sensation  Overall Sensation Status: WFL (numbness in RLE due to s/p TKA)        LUE AROM (degrees)  LUE AROM : WFL (not formally assessed but demo WFL during ADL tasks and functional activities)  RUE AROM (degrees)  RUE AROM : WFL (not formally assessed but demo WFL during ADL tasks and functional activities)  LUE Strength  Gross LUE Strength: WFL (not formally assessed but [facility] during ADL tasks and functional activities)  RUE Strength  Gross RUE Strength: WFL (not formally assessed but [facility] during ADL tasks and functional activities)                   Plan   Plan  Times per week: eval and d/c    AM-PAC Score        AM-PAC Inpatient Daily Activity Raw Score: 21 (08/31/21 1513)  AM-PAC Inpatient ADL T-Scale Score : 44.27 (08/31/21 1513)  ADL Inpatient CMS 0-100% Score: 32.79 (08/31/21 1513)  ADL Inpatient CMS G-Code Modifier : CJ (08/31/21 1513)    Goals  Short term goals  Time Frame for Short term goals: eval and d/c       Therapy Time   Individual Concurrent Group Co-treatment   Time In 5287         Time Out 1242         Minutes 58         Timed Code Treatment Minutes: 43 Minutes     Total Treatment Minutes: [facility], OTS  Eugenia Quick OT     Pt in Observation Status, bills shared with PT      Occupational Therapist read and agrees to the above written documentation  Eugenia Quick OTR/L [id number]

## 2021-08-31 NOTE — ANESTHESIA PROCEDURE NOTES
Peripheral Block    Patient location during procedure: pre-op  Start time: 8/31/2021 6:55 AM  End time: 8/31/2021 6:57 AM  Staffing  Performed: anesthesiologist   Anesthesiologist: Addy Padilla MD  Preanesthetic Checklist  Completed: patient identified, IV checked, site marked, risks and benefits discussed, surgical consent, monitors and equipment checked, pre-op evaluation, timeout performed, anesthesia consent given, oxygen available and patient being monitored  Peripheral Block  Patient position: supine  Prep: ChloraPrep  Patient monitoring: cardiac monitor, continuous pulse ox, frequent blood pressure checks and IV access  Block type: Femoral  Laterality: right  Injection technique: single-shot  Guidance: ultrasound guided  Local infiltration: lidocaine  Infiltration strength: 1 %  Dose: 1 mL  Adductor canal  Provider prep: mask and sterile gloves  Local infiltration: lidocaine  Needle  Needle type: short-bevel   Needle gauge: 20 G  Needle length: 10 cm  Needle localization: ultrasound guidance and anatomical landmarks  Assessment  Injection assessment: negative aspiration for heme, no paresthesia on injection and local visualized surrounding nerve on ultrasound  Paresthesia pain: none  Slow fractionated injection: yes  Hemodynamics: stable  Additional Notes  U/S 87209.  (1) Under ultrasound guidance, a 20 gauge needle was inserted and placed in close proximity to the ac nerve.  (2) Ultrasound was also used to visualize the spread of the anesthetic in close proximity to the nerve being blocked. (3) The nerve appeared anatomically normal, and (4 there were no apparent abnormal pathological findings on the image that were readily visible and related to the nerve being blocked. (5) A permanent ultrasound image was saved in the patient's record.           Medications Administered  Bupivacaine (MARCAINE) PF injection 0.5%, 20 mL  Reason for block: post-op pain management and at surgeon's request

## 2021-08-31 NOTE — PROGRESS NOTES
[facility] Orthopedic Surgery   Progress Note    CHIEF COMPLAINT/DIAGNOSIS: S/p RIGHT Total Knee Arthroplasty    SUBJECTIVE: the patient is sitting up in bed with spouse at bedside. She reports minimal knee pain. Denies numbness/tingling. [place] 2 steps to enter. Needs RW and scheduled to start outpt PT tomorrow. Scripts to be filled in house. OBJECTIVE  Physical    VITALS:  /85   Pulse 89   Temp 97 °F (36.1 °C) (Temporal)   Resp 24   Ht 5' 9\" (1.753 m)   Wt 142 lb 0.8 oz (64.4 kg)   SpO2 92%   BMI 20.98 kg/m²     GENERAL: Alert and oriented x3, in no acute distress. MUSCULOSKELETAL: Able to dorsi and plantarflex the ankle without issue. INCISION:  Covered with post-op dressing/ACE is c/d/I.  ROM: right knee ROM deferred. Sensory:  Intact to light touch in peroneal and tibial distributions. Vascular:   2+ DP pulses with brisk cap refill;  calf soft and nontender    Data    ALL MEDICATIONS HAVE BEEN REVIEWED    CBC: No results for input(s): WBC, HGB, HCT, PLT in the last 72 hours. BMP: No results for input(s): NA, K, CL, CO2, PHOS, BUN, CREATININE in the last 72 hours. Invalid input(s): CA  INR: No results for input(s): INR in the last 72 hours. Post-op films show stable total knee arthroplasty construct without acute complication. ASSESSMENT:  S/p RIGHT Total Knee Arthroplasty (8/31/21), POD#0  HTN  Tobacco use  Depression/anxiety  Fibromyalgia    PLAN:   - WB status:  WBAT; reviewed post op precautions  - DVT prophylaxis: ASA 325mg BID x 14 days  - PT/OT  - Pain Control: percocet. Due to orthopaedic surgical procedure/condition, patient may require pain medication for up to 6-8 weeks. - FEN: post-op TJA insulin protocol  - ID:  Ancef x 1 doses post-op.   - Dispo: home today with outpt PT    Follow-up with Colette Dailey PA-C as scheduled on 9/13.  [phone]  Future Appointments   Date Time Provider Hanna Rachael   9/13/2021  9:45 AM Bety Wolf PA-C [facility]   10/7/2021  7:30 AM PLACIDO Vergara -  PLACIDO Mcneal - CNP  8/31/2021  10:58 AM    Emma Lott was evaluated today and a DME order was entered for a wheeled walker because she requires this to successfully complete daily living tasks of toileting and ambulating. A wheeled walker is necessary due to the patient's unsteady gait, upper body weakness, and inability to  an ambulation device; and she can ambulate only by pushing a walker instead of a lesser assistive device such as a cane, crutch, or standard walker. The need for this equipment was discussed with the patient and she understands and is in agreement.

## 2021-08-31 NOTE — ANESTHESIA PRE PROCEDURE
Department of Anesthesiology  Preprocedure Note       Name:  Melissa Friedman   Age:  78 y.o.  :  1955                                          MRN:  [id number]         Date:  2021      Surgeon: Corona Velasco):  Martir Price MD    Procedure: Procedure(s):  RIGHT ROBOTIC ASSISTED TOTAL KNEE ARTHROPLASTY Rahul Olivas AND NEPHSERGE MARIE ([id number], [id number])    Medications prior to admission:   Prior to Admission medications    Medication Sig Start Date End Date Taking?  Authorizing Provider   gabapentin (NEURONTIN) 300 MG capsule TAKE 1 CAPSULE BY MOUTH THREE TIMES DAILY 21  Anaedilberto Hutson APRN - KIRSTEN   ALPRAZolam Kristin Matt) 0.5 MG tablet TAKE 1 TABLET BY MOUTH EVERY NIGHT AS NEEDED FOR SLEEP OR ANXIETY 8/4/21 9/3/21  Dimas Greene MD   furosemide (LASIX) 80 MG tablet TAKE 1 TABLET BY MOUTH EVERY DAY 7/15/21   PLACIDO Del Rosario NP   ibuprofen (ADVIL;MOTRIN) 200 MG tablet Take 200 mg by mouth every 6 hours as needed for Pain    Historical Provider, MD   pravastatin (PRAVACHOL) 20 MG tablet TAKE 1 TABLET BY MOUTH EVERY EVENING 21   PLACIDO Del Rosario NP   metoprolol succinate (TOPROL XL) 25 MG extended release tablet TAKE 1 TABLET BY MOUTH TWICE DAILY 21   PLACIDO Luis NP   ramipril (ALTACE) 10 MG capsule TAKE 1 CAPSULE BY MOUTH EVERY DAY 21   PLACIDO Del Rosario - NP   VITAMIN D PO Take by mouth    Historical Provider, MD   acetaminophen (TYLENOL) 500 MG tablet Take 1,000 mg by mouth 3 times daily     Historical Provider, MD   Multiple Vitamins-Minerals (THERAPEUTIC MULTIVITAMIN-MINERALS) tablet Take 1 tablet by mouth daily Centrum    Historical Provider, MD       Current medications:    Current Facility-Administered Medications   Medication Dose Route Frequency Provider Last Rate Last Admin    lactated ringers infusion   IntraVENous Continuous Martir Price MD        lidocaine PF 1 % injection 0.5 mL  0.5 mL IntraDERmal Once Stacie Juarez MD        ceFAZolin (ANCEF) 2000 mg in dextrose 5 % 50 mL IVPB  2,000 mg IntraVENous On Call to [facility], MD        celecoxib (CELEBREX) capsule 400 mg  400 mg Oral Once Stacie Juarez MD        ortho mix injection   Injection On Call Stacie Juarez MD        tranexamic acid (CYKLOKAPRON) 1,000 mg in sodium chloride 0.9 % 50 mL IVPB  1,000 mg IntraVENous Once Stacie Juarez MD        tranexamic acid (CYKLOKAPRON) 1,000 mg in sodium chloride 0.9 % 50 mL IVPB  1,000 mg IntraVENous On Call to [facility] [address], MD        meperidine (DEMEROL) injection 12.5 mg  12.5 mg IntraVENous Q5 Min PRN Yoly Campo MD        HYDROmorphone (DILAUDID) injection 0.5 mg  0.5 mg IntraVENous Q5 Min PRN Yoly Campo MD        ondansetron [facility]) injection 4 mg  4 mg IntraVENous Once PRN Yoly Campo MD        labetalol (NORMODYNE;TRANDATE) injection 5 mg  5 mg IntraVENous Q10 Min PRN Yoly Campo MD        hydrALAZINE (APRESOLINE) injection 5 mg  5 mg IntraVENous Q10 Min PRN Yoly Campo MD        midazolam PF (VERSED) injection 2 mg  2 mg IntraVENous Once Yoly Campo MD           Allergies:     Allergies   Allergen Reactions    Darvocet [Propoxyphene N-Acetaminophen] Hives    Lipitor [Atorvastatin]      Leg cramping    Diclofenac Rash       Problem List:    Patient Active Problem List   Diagnosis Code    Osteoarthritis M19.90    Back pain M54.9    Hypertension I10    Fibromyalgia M79.7    Hypoglycemia E16.2    Hearing problem of both ears H91.93    Macular degeneration H35.30    Cervical disc disease M50.90    Tobacco dependence F17.200    Other insomnia G47.09    Anxiety F41.9    Pain medication agreement Z02.89    Chronic, continuous use of opioids F11.90    Polyarthropathy, multiple sites M13.0    Other spondylosis, lumbar region M47.896    Mixed hyperlipidemia E78.2    Bilateral primary osteoarthritis of knee M17.0       Past Medical History:        Diagnosis Date    Anxiety     Fibromyalgia     Gout     Hyperlipidemia     Hypertension     Osteoarthritis        Past Surgical History:        Procedure Laterality Date    APPENDECTOMY  02/13/2019    [facility]/31/2020    RIGHT SACROILIAC JOINT INJECTION AND RIGHT GREATER TROCHANTERIC BURSA INJECTION WITH FLUOROSCOPY (64614, 57743) performed [facility]    KNEE ARTHROSCOPY Right 2009    KNEE ARTHROSCOPY Left     PAIN MANAGEMENT PROCEDURE Right 02/12/2020    RIGHT L4 AND L5 TRANSFORAMINAL EPIDURAL STEROID INJECTION WITH FLUOROSCOPY (80548, 94885) performed by Felicia Parker MD at [address] [place] [address] Right 12/02/2020    RIGHT L4 AND L5 TRANSFORAMINAL EPIDURAL STEROID INJECTION WITH FLUOROSCOPY performed by Felicia Parker MD at [place] [address] History:    Social History     Tobacco Use    Smoking status: Current Every Day Smoker     Packs/day: 1.00     Years: 40.00     Pack years: 40.00     Types: Cigarettes     Start date: 2/1/1990    Smokeless tobacco: Never Used   Substance Use Topics    Alcohol use: No                                Ready to quit: Not Answered  Counseling given: Not Answered      Vital Signs (Current):   Vitals:    07/21/21 0951   Weight: 150 lb (68 kg)   Height: 5' 9\" (1.753 m)                                              BP Readings from Last 3 Encounters:   08/10/21 122/82   07/07/21 (!) 142/86   04/07/21 (!) 140/70       NPO Status:                                                                                 BMI:   Wt Readings from Last 3 Encounters:   07/21/21 150 lb (68 kg)   08/10/21 153 lb 6.4 oz (69.6 kg)   07/14/21 151 lb (68.5 kg)     Body mass index is 22.15 kg/m².     CBC:   Lab Results   Component Value Date    WBC 6.9 08/09/2021    RBC 4.41 08/09/2021    HGB 14.3 08/09/2021    HCT 41.3 08/09/2021    MCV 93.6 08/09/2021    RDW 13.6 08/09/2021     08/09/2021       CMP:   Lab Results   Component Value Date     08/09/2021    K 3.7 08/09/2021     08/09/2021    CO2 28 08/09/2021    BUN 17 08/09/2021    CREATININE 0.6 08/09/2021    GFRAA >60 08/09/2021    GFRAA >60 03/13/2013    AGRATIO 1.8 08/09/2021    LABGLOM >60 08/09/2021    GLUCOSE 93 08/09/2021    PROT 6.5 08/09/2021    PROT 6.4 07/20/2012    CALCIUM 9.4 08/09/2021    BILITOT 0.3 08/09/2021    ALKPHOS 56 08/09/2021    AST 17 08/09/2021    ALT 10 08/09/2021       POC Tests: No results for input(s): POCGLU, POCNA, POCK, POCCL, POCBUN, POCHEMO, POCHCT in the last 72 hours.     Coags:   Lab Results   Component Value Date    PROTIME 10.0 08/09/2021    INR 0.89 08/09/2021    APTT 32.3 08/09/2021       HCG (If Applicable): No results found for: PREGTESTUR, PREGSERUM, HCG, HCGQUANT     ABGs: No results found for: PHART, PO2ART, EOP0EPR, SQL8EJP, BEART, D1WJLFKT     Type & Screen (If Applicable):  No results found for: LABABO, LABRH    Drug/Infectious Status (If Applicable):  No results found for: HIV, HEPCAB    COVID-19 Screening (If Applicable): No results found for: COVID19        Anesthesia Evaluation  Patient summary reviewed and Nursing notes reviewed no history of anesthetic complications:   Airway: Mallampati: II  TM distance: >3 FB   Neck ROM: full  Mouth opening: > = 3 FB Dental:    (+) upper dentures and lower dentures      Pulmonary:normal exam  breath sounds clear to auscultation  (+) current smoker    (-) COPD, asthma and sleep apnea                           Cardiovascular:    (+) hypertension:, hyperlipidemia    (-) past MI, CAD, CABG/stent, dysrhythmias,  angina and  CHF    ECG reviewed  Rhythm: regular  Rate: normal           Beta Blocker:  Dose within 24 Hrs         Neuro/Psych:   (+) neuromuscular disease (fibromyalgia):, depression/anxiety    (-) seizures, TIA and CVA           GI/Hepatic/Renal: Neg GI/Hepatic/Renal ROS       (-) GERD, liver disease and no renal disease       Endo/Other:    (+) : arthritis: OA., .    (-) diabetes mellitus, hypothyroidism, hyperthyroidism               Abdominal:             Vascular: Other Findings:           Anesthesia Plan      MAC, regional and spinal     ASA 2       Induction: intravenous. MIPS: Postoperative opioids intended and Prophylactic antiemetics administered. Anesthetic plan and risks discussed with patient. Plan discussed with CRNA.                   Heidi Gowers, MD   8/31/2021

## 2021-08-31 NOTE — H&P
Patient seen and examined. I have reviewed the history and physical and examined the patient and find no relevant changes. Surgery plan reviewed. Ht 5' 9\" (1.753 m)   Wt 150 lb (68 kg)   BMI 22.15 kg/m²     The patient was counseled at length about the risks of yuliya Covid-19 during their perioperative period and any recovery window from their procedure. The patient was made aware that yuliya Covid-19  may worsen their prognosis for recovering from their procedure  and lend to a higher morbidity and/or mortality risk. All material risks, benefits, and reasonable alternatives including postponing the procedure were discussed. The patient does wish to proceed with the procedure at this time. Site marked and consent verified. All questions answered and antibiotic verified. Ready for right robotic assisted total knee arthroplasty    Rosendo Benton.  Arias, [address] [place] [address] and Sports Medicine  08/31/21  6:44 AM

## 2021-08-31 NOTE — CARE COORDINATION
Mely received a referral to this patient for a rolling walker. Rolling walker has been delivered to the patients room. Thank you for the referral.  Electronically signed by Micaela Umaña on 8/31/2021 at 1:27 PM  Cell ph# [phone]    NOTE: After 5:00 pm, Weekends, Holidays: Call [facility] at [phone] to coordinate delivery of home medical equipment.

## 2021-08-31 NOTE — DISCHARGE INSTR - COC
Continuity of Care Form    Patient Name: Bianca Arias   :  1955  MRN:  [id number]    Admit date:  2021  Discharge date:  ***    Code Status Order: No Order   Advance Directives:   Advance Care Flowsheet Documentation       Date/Time Healthcare Directive Type of Healthcare Directive Copy in [address] [place] [address] [address] Agent's Name Healthcare Agent's Phone Number    21  No, patient does not have an advance directive for healthcare treatment  --  --  --  --  --            Admitting Physician:  Ck Fair MD  PCP: PLACIDO Harris NP    Discharging Nurse: [facility] Unit/Room#: OR/NONE  Discharging Unit Phone Number: ***    Emergency Contact:   Extended Emergency Contact Information  Primary Emergency Contact: Donnell Zepeda  Address: [address] [place] [address], [address] [place] [address] Phone: [phone]  Mobile Phone: [phone]  Relation: Spouse    Past Surgical History:  Past Surgical History:   Procedure Laterality Date    APPENDECTOMY  2019    [facility]. INJECTION PROCEDURE FOR SACROILIAC JOINT Right 2020    RIGHT SACROILIAC JOINT INJECTION AND RIGHT GREATER TROCHANTERIC BURSA INJECTION WITH FLUOROSCOPY (22593, 62896) performed by Larissa Rinaldi MD at [facility]      many    KNEE ARTHROSCOPY Right     KNEE ARTHROSCOPY Left     PAIN MANAGEMENT PROCEDURE Right 2020    RIGHT L4 AND L5 TRANSFORAMINAL EPIDURAL STEROID INJECTION WITH FLUOROSCOPY (35942, 03868) performed by Larissa Rinaldi MD at [facility] Right 2020    RIGHT L4 AND L5 TRANSFORAMINAL EPIDURAL STEROID INJECTION WITH FLUOROSCOPY performed by Larissa Rinaldi MD at [facility]       Immunization History:   Immunization History   Administered Date(s) Administered    COVID-19, DEBBIE Hernandez, 30mcg/0.3mL 01/10/2021, 2021    Hepatitis B (Recombivax HB) 10/01/2015    Influenza Virus Vaccine 11/21/2018    Pneumococcal Polysaccharide (Rmxtpyerh19) 03/18/2013, 12/09/2016    Td, unspecified formulation 10/01/2015    Tdap (Boostrix, Adacel) 06/05/2018    Zoster Live (Zostavax) 10/01/2015       Active Problems:  Patient Active Problem List   Diagnosis Code    Osteoarthritis M19.90    Back pain M54.9    Hypertension I10    Fibromyalgia M79.7    Hypoglycemia E16.2    Hearing problem of both ears H91.93    Macular degeneration H35.30    Cervical disc disease M50.90    Tobacco dependence F17.200    Other insomnia G47.09    Anxiety F41.9    Pain medication agreement Z02.89    Chronic, continuous use of opioids F11.90    Polyarthropathy, multiple sites M13.0    Other spondylosis, lumbar region M47.896    Mixed hyperlipidemia E78.2    Bilateral primary osteoarthritis of knee M17.0       Isolation/Infection:   Isolation            No Isolation          Patient Infection Status       None to display            Nurse Assessment:  Last Vital Signs: /85   Pulse 89   Temp 97 °F (36.1 °C) (Temporal)   Resp 24   Ht 5' 9\" (1.753 m)   Wt 142 lb 0.8 oz (64.4 kg)   SpO2 92%   BMI 20.98 kg/m²     Last documented pain score (0-10 scale): Pain Level: 0  Last Weight:   Wt Readings from Last 1 Encounters:   08/31/21 142 lb 0.8 oz (64.4 kg)     Mental Status:  {IP PT MENTAL STATUS:20030:::0}    IV Access:  { ROSANA IV ACCESS:173793371:::0}    Nursing Mobility/ADLs:  Walking   {CHP DME ADLs:437737785:::0}  Transfer  {CHP DME ADLs:380260333:::0}  Bathing  {CHP DME ADLs:290163285:::0}  Dressing  {CHP DME ADLs:339943849:::0}  Toileting  {CHP DME ADLs:545588285:::0}  Feeding  {CHP DME ADLs:262102851:::0}  Med Admin  {P DME ADLs:898898143:::0}  Med Delivery   { ROSANA MED Delivery:771220805:::0}    Wound Care Documentation and Therapy:        Elimination:  Continence:    Bowel: {YES / KR:25834}  Bladder: {YES / VY:60065}  Urinary Catheter: {Urinary Catheter:665831202:::0} Colostomy/Ileostomy/Ileal Conduit: {YES / PN:16352}       Date of Last BM: ***    Intake/Output Summary (Last 24 hours) at 2021 1100  Last data filed at 2021 0927  Gross per 24 hour   Intake 2400 ml   Output --   Net 2400 ml     No intake/output data recorded.     Safety Concerns:     [address] Safety Concerns:099031053:::0}    Impairments/Disabilities:      [facility] Impairments/Disabilities:290213657:::0}    Nutrition Therapy:  Current Nutrition Therapy:   [facility] Diet List:409278854:::0}    Routes of Feeding: {CHP DME Other Feedings:421142776:::0}  Liquids: {Slp liquid thickness:66011}  Daily Fluid Restriction: {CHP DME Yes amt example:420116205:::0}  Last Modified Barium Swallow with Video (Video Swallowing Test): {Done Not Done ETOT:739975489:::3}    Treatments at the Time of Hospital Discharge:   Respiratory Treatments: ***  Oxygen Therapy:  {Therapy; copd oxygen:98256:::0}  Ventilator:    {[facility] Vent List:963622977:::0}    Rehab Therapies: {THERAPEUTIC INTERVENTION:1309554959}  Weight Bearing Status/Restrictions: [facility]  Weight Bearin:::0}  Other Medical Equipment (for information only, NOT a DME order):  {EQUIPMENT:401384325}  Other Treatments: ***    Patient's personal belongings (please select all that are sent with patient):  {P DME Belongings:311611596:::0}    RN SIGNATURE:  {Esignature:178819314:::0}    CASE MANAGEMENT/SOCIAL WORK SECTION    Inpatient Status Date: ***    Readmission Risk Assessment Score:  Readmission Risk              Risk of Unplanned Readmission:  0           Discharging to Facility/ Agency   Name:   Address:  Phone:  Fax:    Dialysis Facility (if applicable)   Name:  Address:  Dialysis Schedule:  Phone:  Fax:    / signature: {Esignature:592641963:::0}    PHYSICIAN SECTION    Prognosis: {Prognosis:6627392849:::0}    Condition at Discharge: [facility]mie Yen Patient Condition:764585230:::0}    Rehab Potential (if transferring to Rehab): {Prognosis:8341666459:::0}    Recommended Labs or Other Treatments After Discharge: ***    Physician Certification: I certify the above information and transfer of Adi Moore  is necessary for the continuing treatment of the diagnosis listed and that she requires {Admit to Appropriate Level of Care:26027:::0} for {GREATER/LESS:366166885} 30 days.      Update Admission H&P: {CHP DME Changes in HandP:916552786:::0}    PHYSICIAN SIGNATURE:  {Esignature:042050171:::0}

## 2021-08-31 NOTE — PROGRESS NOTES
Time out performed prior to nerve block, correct pt, site procedure and consent. Dr. Hector Collins and this nurse present during time out.

## 2021-08-31 NOTE — PROGRESS NOTES
.. 333  [facility] for Joint Replacement Model  Notification Letter    [facility] is participating in a New Care Improvement Initiative from [employer]    [facility] is participating in a Medicare initiative called the [address] [place] [address] for Joint Replacement (CJR) model. The CJR model aims to promote quality and financial accountability for care surrounding lower-extremity joint replacement (LEJR) procedures, commonly referred to as hip and knee replacements and/or other major leg procedures. [facility] participation in the [address] [place] [address],[address] [facility] should not restrict your access to care for your medical condition or your freedom to choose your health care providers and services. All existing Medicare beneficiary protections continue to be available to you. These include the ability to report concerns of substandard care to Quality Improvement Organizations and 1-800-MEDICARE. The CJR model aims to help give you better care. The CJR model aims to support better and more efficient care for beneficiaries undergoing LEJR procedures. A CJR episode of care is typically defined as an admission of an eligible Medicare beneficiary to a hospital participating in the [address] [place] [address],[address] [facility] that eventually results in a discharge paid under Medicare Severity-Diagnosis Related Groups (MS-DRG) 469 (major joint replacement or reattachment of lower extremity with major complications or comorbidities) or 470 (major joint replacement or reattachment of lower extremity without major complications or comorbidities). The CJR episode of care continues for 90 days following discharge.  This model tests bundled payment and quality measurement for an episode of care associated with LEJR procedures to encourage hospitals, physicians, and post-acute care providers to work together to improve the quality and coordination of care from the initial hospitalization through recovery. Through this bundled payment model, [facility] will receive additional payments if quality and spending performance are strong or, if not, potentially have to repay Medicare for a portion of the spending for care surrounding a lower extremity joint replacement procedure. Medicare is using the CJR model to encourage [facility] to work more closely with your doctors and other health care providers that help patients recover after discharge from the hospital, including nursing homes (skilled nursing facilities), home health agencies, inpatient rehabilitation facilities, and long-term care hospitals. The goal of the model is to encourage these providers and suppliers to provide you with better, more coordinated care during and following your hospital stay. The model is expected to lower the cost of care to [employer] but your costs for covered care will not increase due to these changes. [facility] is working closely with the doctors and other health care providers and suppliers who will care for you during and following your hospital stay and extending through the recovery period. By working together, your health care providers and suppliers are planning more efficient, high quality care as you undergo treatment. Medicare will monitor your care to ensure you and others are receiving high quality care. It's your choice which hospital, doctor, or other providers you use. You have the right to choose which hospital, doctor, or other post-hospital stay health care provider you use. · To find a different doctor, visit Medicare's Physician Compare website, HDTapes.co.nz, or call 1-800-MEDICARE (300 8175). TTY users should call 5-651.343.2860. · To find a different hospital, visit [employer]anz or call 1-800-MEDICARE (1-147.450.9860).  TTY users should call 3-822.420.2337. · To find a different skilled nursing facility, visit [facility] website, https://[email or web address]/, or call 1-800-MEDICARE (882 5607). TTY users should call 5-451.704.7579. · To find a different home health agency, visit [employer] website, [employer]Fin Quiver, or call 1-800-MEDICARE (0-394.830.3988). TTY users should call 6-774.467.5253. · If you believe that your care is adversely affected or have concerns about substandard care, you may call 1-800-MEDICARE or contact your state's Quality Improvement Organization by going to: TearScience.Mimoco. For an explanation of how patients can access their health care records and beneficiary claims data, please visit Corrigan and [employer] [email or web address]. Get more information     If you have questions or want more information about the Comprehensive Care for Joint Replacement (CJR) model, call Tigre Fisher RN, Care Transitions, [facility] at [phone] or call 1-800-MEDICARE. You can also find additional information at https://innovation.cms.gov/initiatives/cjr.

## 2021-08-31 NOTE — PROGRESS NOTES
Second dose of ancef given per [facility]. Pt has worked with PT/OT, denies pain. Ambulated to bathroom, voided. Tolerating PO intake. Walker and prescriptions delivered. Pt safely dressed and transferred self to wheelchair, IV removed without complications. Pt discharged in wheelchair with all belongings to car by Johnson Parker. Pt tolerated well.

## 2021-08-31 NOTE — PROGRESS NOTES
Patient alert and oriented. Reviewed discharge, follow up, and medication instructions with patient and . Patient states understanding. Educated patient  to wear yolande hose for 2 weeks and to remove at night and use incentive spirometer and take all medication as directed. Patient given instructions that due to state law, pain medication was written for 7 days and cannot be filled early. Instructed patient to take small dose of narcotic as possible and call the office if refill is needed after 7 days. Prescription handed to patient and/or family. Waiting on iv abx, rolling walker delivery, prescription delivery, pt/ot and patient void before discharge. Gabi Gonzalez RN    Patient declined to use [employer].

## 2021-08-31 NOTE — OP NOTE
Operative Note      Patient: Connye Alpers  YOB: 1955  MRN: [id number]    Date of Procedure: 8/31/2021    Pre-Op Diagnosis: M17.11 RIGHT KNEE OSTEOARTHITIS    Post-Op Diagnosis: Same       Procedure(s):  RIGHT ROBOTIC ASSISTED TOTAL KNEE ARTHROPLASTY [facility] CYNTHIA (46735, [id number])    Surgeon(s):  Yris Palomo MD    Assistant:   Surgical Assistant: Chelsea Booker; Estelle Mcconnell; Estela Amaya  Physician Assistant: Awais Vaca PA-C    Anesthesia: General    Estimated Blood Loss (mL): less than 50     Complications: None    Specimens:   * No specimens in log *    Implants:  Implant Name Type Inv. Item Serial No.  Lot No. LRB No. Used Action   CEMENT BONE 40GM HI VISC RALLY  CEMENT BONE 40GM HI VISC RALLY  [employer] Jose Heads [id number] Right 2 Implanted   COMPONENT PAT [id number] THK9MM STD KNEE RND NP MARIO BICRUCIATE  COMPONENT PAT [id number] THK9MM STD KNEE RND NP MARIO BICRUCIATE  [employer]- [id number] Right 1 Implanted   BASEPLATE TIB SZ 5 [id number] ML74MM R KNEE NP MARIO JOURNEY  BASEPLATE TIB SZ 5 [id number] ML74MM R KNEE NP MARIO JOURNEY  [employer]- [id number] Right 1 Implanted   COMPONENT FEM SZ 6 R KNEE OXINIUM BI CRUCE STBL JOURNEY II  COMPONENT FEM SZ 6 R KNEE OXINIUM BI CRUCE STBL [facility]- [id number] Right 1 Implanted   INSERT TIB SZ 5-6 [id number] R KNEE XLPE BI CRUCE ARTC  INSERT TIB SZ 5-6 [id number] R KNEE XLPE BI [facility] AND NEPH Jose Heads [id number] Right 1 Implanted         Drains:   Urethral Catheter Non-latex 16 fr (Active)       Findings: tricompartmental osteoarthritis right knee    Detailed Description of Procedure:   Operative Report: Indications: The patient is a 78 y. o.female is here for elective right total knee arthroplasty. She has failed conservative measures as outlined in the office notes.   She has been informed of the risks / benefits / rehab and femoral trial was removed and the tibial baseplate was then pinned into position and drilled and punched as per technique. 60 mL of a mixture of orthopaedic analgesic mixture diluted in saline was carefully infiltrated into the posterior capsule and medial / lateral gutter regions with a 22 gauge needle. Care was taken to ensure removal of posterior femoral osteophytes as well. The cut surfaces of the bone were cleansed first with dilute chlorhexidine followed by pulsatile lavage. The cement was mixed on the back table as per technique. Final components were then cemented in position in a stepwise fashion with excess cement removed at each step. Knee was held in full extension with the trial polyethylene inserted and the patella was clamped into position. The cement was allowed to cure. A solution of dilute chlorhexidine was placed in the wound and allowed to sit for 2-3 minutes to aid in bacterial control. It was then removed with suction and the entire joint was again cleansed with pulsatile lavage. The knee was then taken out of extension position and any excess cement was carefully removed with a quarter-inch osteotome. The knee continued show excellent tracking with good range of motion and stability to varus and valgus stress. The trial polyethylene was removed. Care was taken to ensure all posterior cement was removed and the knee was thoroughly irrigated with pulsatile lavage. The final 10 mm polyethylene surface was then snapped into position without difficulty and again showed similar stability, range of motion, and patellar tracking as trials. The tourniquet was then deflated and hemostasis was achieved. 30 mL of orthopedic analgesia mixture diluted in saline was carefully infiltrated into the distal femur periosteum and anterior capsular tissue. The joint was closed with running #2 Stratfix. Skin was closed with interrupted 2-0 Vicryl and running 3-0 Monocryl.   Dermabond and Prineo dressing were applied and allowed to dry. The dressing was covered with silver impregnated Therabond. The limb was wrapped with sterile cast padding at the knee and from the foot to the thigh with an Ace bandage. A cryocuff was also applied over the Ace wrap. The limb was placed in an immobilizer to be utilized with ambulation until adequate quad function returns. All needle and sponge counts matched the initial count per circulating and scrub personnel x2 prior to ending the case and the patient was awakened and taken to the recovery room in stable condition with brisk capillary refill to the operative limb and having tolerated the procedure well. Krysta Jo PA-C assisted me during this surgery. His services were required to assist with the procedure by providing help with patient positioning and prepping, exposure, retraction and closure. Electronically signed by:  Michell Bianchi, [address] [place] [address] and Sports Medicine  8/31/21   8:50 AM EDT        Electronically signed by Katja Godinez MD on 8/31/2021 at 8:49 AM

## 2021-08-31 NOTE — PROGRESS NOTES
Pt resting quietly in bed, awake, tolerating PO intake. VSS, O2 sats 92% on room air. Dressing to right knee dry and intact, right pedal and posterior tibial pulses palpable. Ice packs in place on right knee. Post-op XRAY obtained. Neuro assessment WDL, pt able to perform a straight leg raise. PT/OT paged. Pt seen by anesthesia, phase 1 criteria met.

## 2021-08-31 NOTE — PROGRESS NOTES
Pt arrived from OR to PACU, awake, denies pain. VSS, O2 sats 95% on 6 L simple mask. Dressing to right knee dry and intact, right pedal and posterior tibial pulses palpable, foot warm. Neuro assessment WDL. Will monitor.

## 2021-08-31 NOTE — ANESTHESIA POSTPROCEDURE EVALUATION
Department of Anesthesiology  Postprocedure Note    Patient: Augusta Arriaga  MRN: [id number]  YOB: 1955  Date of evaluation: 8/31/2021  Time:  10:00 AM     Procedure Summary     Date: 08/31/21 Room / Location: [address] [place] [address]    Anesthesia Start: Belgica Collins Anesthesia Stop: 2239    Procedure: RIGHT ROBOTIC ASSISTED TOTAL KNEE ARTHROPLASTY - [address] NEPHEW ADVANCED (58762, 19716) (Right Knee) Diagnosis: (M17.11 RIGHT KNEE OSTEOARTHITIS)    Surgeons: Ashley Gomez MD Responsible Provider: Geovani Bauman MD    Anesthesia Type: MAC, regional, spinal ASA Status: 2          Anesthesia Type: MAC, regional, spinal    Kira Phase I:      Kira Phase II:      Last vitals: Reviewed and per EMR flowsheets.        Anesthesia Post Evaluation    Patient location during evaluation: PACU  Patient participation: complete - patient participated  Level of consciousness: awake and alert  Airway patency: patent  Nausea & Vomiting: no vomiting and no nausea  Complications: no  Cardiovascular status: hemodynamically stable  Respiratory status: acceptable  Hydration status: stable

## 2021-08-31 NOTE — PROGRESS NOTES
Physical Therapy    Facility/Department: [facility] OR  Initial Assessment    NAME: Aide Andino  : 1955  MRN: [id number]    Date of Service: 2021     Discharge Recommendations: iAde Andino scored a 16/24 on the AM-PAC short mobility form. Current research shows that an AM-PAC score of 18 or greater is typically associated with a discharge to the patient's home setting. Based on the patient's AM-PAC score and their current functional mobility deficits, it is recommended that the patient have 2-3 sessions per week of Physical Therapy at d/c to increase the patient's independence. At this time, this patient demonstrates the endurance and safety to discharge home with 24/7 assist and home PT and a follow up treatment frequency of 2-3x/wk. Please see assessment section for further patient specific details. Despite pt's score of 16/24, pt is able to safely ambulate household distances with assistance of 1 and will have 24/7 assistance from spouse and daughter. No safety concerns with discharge home and follow-up with outpatient PT at this time. If patient discharges prior to next session this note will serve as a discharge summary. Please see below for the latest assessment towards goals. PT Equipment Recommendations  Equipment Needed: Yes  Mobility Devices: Walker (rolling walker)    Assessment   Body structures, Functions, Activity limitations: Decreased functional mobility ; Decreased balance;Decreased ROM; Decreased strength;Decreased sensation;Decreased endurance  Assessment: Pt is 77 yo female who came in today for R TKA procedure. Skilled PT required to assess strength and motion s/p surgery. Pt presented with above limitations/impairments secondary to s/p R TKA. Recommending continued skilled PT to safely progress independence with functional mobility back to baseline.   Treatment Diagnosis: Decreased strength, balance and ROM of R knee secondary to s/p R TKA post-op day #0  Prognosis: Good  Decision Making: Low Complexity  History: See below  Exam: Balance, functional mobility  Clinical Presentation: stable  PT Education: Goals;PT Role;Home Exercise Program;Precautions;Transfer Training;General Safety;Gait Training;Weight-bearing Education  Patient Education: Pt recieved and understood education provided above  Barriers to Learning: none  REQUIRES PT FOLLOW UP: No  Activity Tolerance  Activity Tolerance: Patient Tolerated treatment well       Patient Diagnosis(es): The encounter diagnosis was Primary osteoarthritis of right knee. has a past medical history of Anxiety, Fibromyalgia, Gout, Hyperlipidemia, Hypertension, and Osteoarthritis. has a past surgical history that includes Hysterectomy; Knee arthroscopy (Right, 2009); Inner ear surgery; Appendectomy (02/13/2019); Pain management procedure (Right, 02/12/2020); Injection Procedure For Sacroiliac Joint (Right, 08/31/2020); Pain management procedure (Right, 12/02/2020); and Knee arthroscopy (Left). Restrictions  Restrictions/Precautions  Restrictions/Precautions: Weight Bearing, Fall Risk (high fall risk)  Required Braces or Orthoses?: No  Lower Extremity Weight Bearing Restrictions  Right Lower Extremity Weight Bearing: Weight Bearing As Tolerated  Position Activity Restriction  Other position/activity restrictions: S/p RIGHT Total Knee Arthroplasty (8/31/21     Vision/Hearing  Vision: Impaired  Vision Exceptions: Wears glasses at all times  Hearing: Exceptions to [facility]  Hearing Exceptions: Hard of hearing/hearing concerns; No hearing aid       Subjective  General  Chart Reviewed: Yes  Patient assessed for rehabilitation services?: Yes  Response To Previous Treatment: Not applicable  General Comment  Comments: Pt. found supine in hospital bed, head reclined; pt. willing to participate in therapy  Subjective  Subjective: Pt. reports R knee and thigh feeling numb  Pain Screening  Patient Currently in Pain: Denies Orientation  Orientation  Overall Orientation Status: Within Normal Limits     Social/Functional History  Social/Functional History  Lives With: Spouse (2 yr old grandson; daughter plans on staying for 2 weeks as needed)  Type of Home: House  Home Layout: One level, Laundry in basement  Home Access: Stairs to enter without rails  Entrance Stairs - Number of Steps: 2 MIGUELITO, 12 MIGUELITO basement railings on L and R side but not simultaneously  Bathroom Shower/Tub: Tub/Shower unit, Shower chair without back, Curtain  Bathroom Toilet: Standard  Bathroom Equipment: Hand-held shower  Bathroom Accessibility: Walker accessible  Home Equipment: 4 wheeled walker, Reacher  ADL Assistance: Independent  Homemaking Assistance: Independent  Homemaking Responsibilities: Yes  Ambulation Assistance: Independent  Transfer Assistance: Independent  Active : Yes  Occupation: Full time employment  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  IADL Comments: sleeps in flat bed  Additional Comments: fell in Feb and June had assitance with getting up but believes would have been capable of getting up on own     Cognition   See OT note    Objective  Observation/Palpation  Observation: RLE wrapped in ace bandage    AROM RLE (degrees)  RLE AROM: Exceptions  RLE General AROM: Pt's RLE available ROM did not limit functional mobility tasks  R SLR: performed 3x; first rep required CGA, remaining 2 were done independently  AROM LLE (degrees)  LLE AROM : WNL     Strength RLE  Strength RLE: Exception  Comment: R quad weakness; buckled during amb 1x and on stairs 1x  Strength LLE  Strength LLE: WFL  Comment: Grossly at least 3/5 as observed through functional mobility.      Tone RLE  RLE Tone: Normotonic  Tone LLE  LLE Tone: Normotonic  Motor Control  Gross Motor?: WNL     Sensation  Overall Sensation Status: WFL (numbness in RLE s/p TKA)     Bed mobility  Supine to Sit: Supervision  Sit to Supine: Stand by assistance Transfers  Sit to Stand: Stand by assistance (required cueing to not press hands up on RW)  Stand to sit: Stand by assistance (transferred to toilet x1 and transport chair x2; required cues hand placement need to safely lower to toilet)  Car Transfer: Supervision (performed in/out of transport chair x3)     Ambulation  Ambulation?: Yes  WB Status: WBAT  Ambulation 1  Surface: level tile  Device: Rolling Walker  Assistance: Contact guard assistance;Minimal assistance (min A required for LOB when pt's R knee buckled)  Quality of Gait: step-to pattern in RW  Gait Deviations: Slow Carly;Decreased step length  Distance: 40 + 40 ft  Comments: Pt. had LOB x1; R knee buckled and min A was required by therapist for recovery. Stairs/Curb  Stairs?: Yes  Stairs  # Steps : 2  Stairs Height: 6\"  Rails:  (pt held onto railing for balance as therapist adjusted walker)  Device: Rolling walker  Assistance: Contact guard assistance;Minimal assistance (min A required for LOB)  Comment: Pt had LOB x1 on steps; min A was required by therapist for recovery. Pt educated on \"up with the good, down with the bad\", and ascended steps backward, descended forward. Balance  Posture: Good  Sitting - Static: Good;-  Sitting - Dynamic: Good;-  Standing - Static: Good;-  Standing - Dynamic: Fair;+     Exercises  Straight Leg Raise: x3; first required CGA  Comments: Reviewed HEP packet with patient and questions answered. Plan   Plan  Times per week: Anticipate d/c this date  Current Treatment Recommendations: Strengthening, ROM, Balance Training, Functional Mobility Training, Transfer Training, Gait Training, Stair training, Safety Education & Training  Safety Devices  Type of devices:  All fall risk precautions in place, Gait belt, Left in bed, Patient at risk for falls, Nurse notified    AM-PAC Score  AM-PAC Inpatient Mobility Raw Score : 16 (08/31/21 1310)  AM-PAC Inpatient T-Scale Score : 40.78 (08/31/21 1310)  Mobility Inpatient CMS 0-100% Score: 54.16 (08/31/21 1310)  Mobility Inpatient CMS G-Code Modifier : CK (08/31/21 1310)          Therapy Time   Individual Concurrent Group Co-treatment   Time In 1144         Time Out 1243         Minutes 59         Timed Code Treatment Minutes: 8 Minutes   Pt in observation status, billing split with OT (1 unit unbilled)       DOV Falcon  Therapist was present, directed the patient's care, made skilled judgement, and was responsible for assessment and treatment of the patient.         Ruy Mendieta, PT, DPT #[id number]

## 2021-09-01 ENCOUNTER — TELEPHONE (OUTPATIENT)
Dept: INPATIENT UNIT | Age: 66
End: 2021-09-01

## 2021-09-01 NOTE — TELEPHONE ENCOUNTER
Spoke with Patient regarding post discharge from hospital.  Showered this am, feels great. Had to change dressing, cleaned     Incision status: yes - had saturated dressing this am when she took off the ace. Took off old dressing, cleaned area with CHG and applied new dressing. Patient states there is no new drainage since she changed it this am but would be interested in getting another one. Asked patient to call my number if she wanted to stop by the hospital to  a new one. Denies odor, or redness noted per patient    Edema/Swelling:  :moderate in thigh area    Wearing Teds: yes, wants to wear her thigh high-hoses. Pain level and status: \"not too bad\" , took 1/2 pill last night, took tylenol this am,     Use of pain medications: peercocet ; Patient stated they are taking their pain medication as prescribed. Reminded patient due to state law, we will be unable to refill pain pills early, so takes smallest dose possible and call the office if prescription refill is needed past 7 days. Use of ice therapy: Yes     Blood thinner: ASPIRIN ; Verified with patient that they are taking their anticoagulant as prescribed 2 a day. (clarified she should take twice not once)    Bowels: No: states will start taking miralax     Outpatient therapy: yes, went this am. Was told     Do you have all of your medications: Yes    Changes in medications: no    Using Incentive Spirometer?: Yes    Did you Attend Pre-hab?  yes    Ortho Vitals: N/A    How was your care while you were in the hospital? 15/10     Answered questions or concerns about  dressings    Reminded patient that they will be getting a survey in the mail and we appreciate their feedback and taking the time to answer all the questions and return. No other questions/concerns at this time. Follow up appointment confirmed. Encouraged patient to call Orthopedic Nurse Yahaira Yousif (#[phone]) or Orthopedic office if has any questions/concerns. Follow up appointments:    Future Appointments   Date Time Provider Hanna Cano   9/13/2021  9:45 AM Smita Lima PA-C [facility]   10/7/2021  7:30 AM PLACIDO Guidry NP, [address]

## 2021-09-03 ENCOUNTER — TELEPHONE (OUTPATIENT)
Dept: ORTHOPEDIC SURGERY | Age: 66
End: 2021-09-03

## 2021-09-03 NOTE — TELEPHONE ENCOUNTER
Called and LMOM for pt's daughter to call back, as there was no identifiers. If pt daughter calls back, please advise that she can come to the [facility] office today, to  the [facility].

## 2021-09-03 NOTE — TELEPHONE ENCOUNTER
General Question     Subject: DAUGHTER/RAMON CALLED IN STATING SHE IS CARING FOR HER MOTHER, AND SHE NEEDS SILVER DRESSING. SHE WANTS TO KNOW IF SHE CAN STOP DOWN TODAY AND PICK THAT UP?   Patient and /or Facility Request: Olayinka Sandoval Number: [phone]

## 2021-09-03 NOTE — TELEPHONE ENCOUNTER
Daughter said she got the incision covered and it is okay for now until she is seen next week. She said she will call back if she needs the bandage.

## 2021-09-07 DIAGNOSIS — M17.11 PRIMARY OSTEOARTHRITIS OF RIGHT KNEE: ICD-10-CM

## 2021-09-07 RX ORDER — OXYCODONE HYDROCHLORIDE AND ACETAMINOPHEN 5; 325 MG/1; MG/1
1-2 TABLET ORAL EVERY 4 HOURS PRN
Qty: 50 TABLET | Refills: 0 | Status: SHIPPED | OUTPATIENT
Start: 2021-09-07 | End: 2021-09-17 | Stop reason: SDUPTHER

## 2021-09-07 NOTE — TELEPHONE ENCOUNTER
Called and informed pt of refill and that she should pay attention to the sig as it will adjust the further she is from surgery. She stated that she is taking the lowest amount possible. Informed her that if she needed anything else to call back. She understood.

## 2021-09-07 NOTE — TELEPHONE ENCOUNTER
Prescription Refill     Medication Name:  OXYCODONE  Pharmacy: [id number] [facility]  Patient Contact Number:  [phone]

## 2021-09-13 ENCOUNTER — OFFICE VISIT (OUTPATIENT)
Dept: ORTHOPEDIC SURGERY | Age: 66
End: 2021-09-13

## 2021-09-13 VITALS — WEIGHT: 142 LBS | HEIGHT: 69 IN | BODY MASS INDEX: 21.03 KG/M2

## 2021-09-13 DIAGNOSIS — M17.11 PRIMARY OSTEOARTHRITIS OF RIGHT KNEE: Primary | ICD-10-CM

## 2021-09-13 PROCEDURE — 99024 POSTOP FOLLOW-UP VISIT: CPT | Performed by: PHYSICIAN ASSISTANT

## 2021-09-13 NOTE — PROGRESS NOTES
Patient Name: Amor Lizarraga  Medical Record Number: [id number]  YOB: 1955  Date of Encounter: 9/13/2021     Chief Complaint   Patient presents with    Post-Op Check     Right TKA, robotic; DOS 8/31/21. Total Knee Follow-up  Randa Duncan is here for 2 weeks post right total knee arthroplasty follow-up. Pain is controlled with oxycodone. The patient denies fever, wound drainage, increasing redness, pus, increasing pain, increasing swelling. Post op problems reported: none. She is ambulating using a walker. She is working with physical therapy. Patient is still taking Vitamin D supplementation. DVT prophylaxis is ASA. The patient's  past medical history, medications, allergies,  family history, social history, and review of systems have been reviewed, and dated and are recorded in the chart under the 'MEDIA\" tab. Physical Exam:   Ms. Amor Lizarraga appears well, she is in no apparent distress, she demonstrates appropriate mood & affect. She is alert and oriented to person, place and time. Ht 5' 9\" (1.753 m)   Wt 142 lb (64.4 kg)   BMI 20.97 kg/m²     Right Knee: She has mild swelling which is resolving as expected. The incision is clean, dry, intact and nontender and without erythema or induration. Range of motion from -10 to 105 degrees. She has mild pain with range of motion of the knee. Patient has 4-/5 motor strength with flexion and extension of the right knee. She is able to do straight leg raises without difficulty. Patient is wearing her bilateral PAUL stockings. There is minimal right lower extremity edema. She is neurovascularly intact distally. Radiology:  X-rays obtained and reviewed in office:  Views: 2 views right knee. Impression: Stable total knee arthroplasty with satisfactory alignment. There is no evidence of loosening or subsidence.     Orders:  Orders Placed This Encounter   Procedures    XR KNEE RIGHT (1-2 VIEWS)     Impression:   Status post right total knee arthroplasty and doing very well. Plan: At this time, she will continue physical therapy. Follow up will be in 3 week(s) and no radiology should be warranted. Patient will return to the office sooner for worsening or changes in symptoms. Melissa Friedman was informed of the results of any imaging. We discussed her postop course to date and a time was given to answer questions. She understand and accepts this course of care. Electronically signed by Eugenia Quick PA-C on 7/52/8275  Board Certified [facility]    Please note that portions of this note were completed with a voice recognition program.  Efforts were made to edit the dictations but occasionally words are mis-transcribed.

## 2021-09-16 DIAGNOSIS — M17.11 PRIMARY OSTEOARTHRITIS OF RIGHT KNEE: ICD-10-CM

## 2021-09-16 RX ORDER — RAMIPRIL 10 MG/1
CAPSULE ORAL
Qty: 90 CAPSULE | Refills: 0 | Status: SHIPPED | OUTPATIENT
Start: 2021-09-16 | End: 2021-12-13

## 2021-09-16 RX ORDER — METOPROLOL SUCCINATE 25 MG/1
TABLET, EXTENDED RELEASE ORAL
Qty: 180 TABLET | Refills: 0 | Status: SHIPPED | OUTPATIENT
Start: 2021-09-16 | End: 2021-12-13

## 2021-09-16 NOTE — TELEPHONE ENCOUNTER
Prescription Refill     Medication Name:  OXYCODONE - PERCOCET  Pharmacy: [address] [place] [address]  Patient Contact Number:  [phone]

## 2021-09-16 NOTE — TELEPHONE ENCOUNTER
Medication:   Requested Prescriptions     Pending Prescriptions Disp Refills    metoprolol succinate (TOPROL XL) 25 MG extended release tablet [Pharmacy Med Name: METOPROLOL ER SUCCINATE 25MG TABS] 180 tablet 0     Sig: TAKE 1 TABLET BY MOUTH TWICE DAILY    ramipril (ALTACE) 10 MG capsule [Pharmacy Med Name: RAMIPRIL 10MG CAPSULES] 90 capsule 0     Sig: TAKE 1 CAPSULE BY MOUTH EVERY DAY      Last Filled:      Patient Phone Number: [phone] (home)     Last appt: 8/10/2021   Next appt: 10/7/2021    Last OARRS:   RX Monitoring 4/24/2019   Attestation The Prescription Monitoring Report for this patient was reviewed today. Periodic Controlled Substance Monitoring -   Chronic Pain > 50 MEDD Obtained or confirmened \"Consent of Opioid Use\" on file.      PDMP Monitoring:    Last PDMP Sandy Gilmore as Reviewed [facility]):  Review User Review Instant Review Result   Leilani Stapleton 7/7/2021  8:34 AM Reviewed PDMP [1]     Preferred Pharmacy:   [address] [place] [address]  Phone: [phone] Fax: [phone]

## 2021-09-17 RX ORDER — OXYCODONE HYDROCHLORIDE AND ACETAMINOPHEN 5; 325 MG/1; MG/1
1-2 TABLET ORAL EVERY 4 HOURS PRN
Qty: 42 TABLET | Refills: 0 | Status: SHIPPED | OUTPATIENT
Start: 2021-09-17 | End: 2021-10-06 | Stop reason: SDUPTHER

## 2021-10-05 DIAGNOSIS — I10 HYPERTENSION, UNSPECIFIED TYPE: ICD-10-CM

## 2021-10-05 RX ORDER — PRAVASTATIN SODIUM 20 MG
20 TABLET ORAL EVERY EVENING
Qty: 90 TABLET | Refills: 0 | Status: SHIPPED | OUTPATIENT
Start: 2021-10-05 | End: 2022-01-03

## 2021-10-05 NOTE — TELEPHONE ENCOUNTER
Medication:   Requested Prescriptions     Pending Prescriptions Disp Refills    pravastatin (PRAVACHOL) 20 MG tablet [Pharmacy Med Name: PRAVASTATIN 20MG TABLETS] 90 tablet 0     Sig: TAKE 1 TABLET BY MOUTH EVERY EVENING        Last Filled: 7/7/2021      Patient Phone Number: [phone] (home)     Last appt: 7/7/2021   Next appt: 10/7/2021    Last OARRS:   RX Monitoring 4/24/2019   Attestation The Prescription Monitoring Report for this patient was reviewed today. Periodic Controlled Substance Monitoring -   Chronic Pain > 50 MEDD Obtained or confirmened \"Consent of Opioid Use\" on file.

## 2021-10-06 ENCOUNTER — OFFICE VISIT (OUTPATIENT)
Dept: ORTHOPEDIC SURGERY | Age: 66
End: 2021-10-06

## 2021-10-06 VITALS — HEIGHT: 69 IN | BODY MASS INDEX: 23.11 KG/M2 | WEIGHT: 156 LBS

## 2021-10-06 DIAGNOSIS — M17.11 PRIMARY OSTEOARTHRITIS OF RIGHT KNEE: ICD-10-CM

## 2021-10-06 PROCEDURE — 99024 POSTOP FOLLOW-UP VISIT: CPT | Performed by: PHYSICIAN ASSISTANT

## 2021-10-06 RX ORDER — OXYCODONE HYDROCHLORIDE AND ACETAMINOPHEN 5; 325 MG/1; MG/1
1 TABLET ORAL EVERY 6 HOURS PRN
Qty: 35 TABLET | Refills: 0 | Status: SHIPPED | OUTPATIENT
Start: 2021-10-06 | End: 2021-10-18 | Stop reason: SDUPTHER

## 2021-10-06 NOTE — PROGRESS NOTES
Patient Name: Louis Castañeda  Medical Record Number: [id number]  YOB: 1955  Date of Encounter: 10/6/2021     Chief Complaint   Patient presents with    Post-Op Check     Right TKA, robotic; DOS 8/31/21. Total Knee Follow-up  Randa Robison is here for 5 weeks post right total knee arthroplasty follow-up. Pain is controlled with occasional oxycodone. The patient denies fever, wound drainage, increasing redness, pus, increasing pain, increasing swelling. Post op problems reported: none. She is ambulating using a straight cane. She is working with physical therapy. Patient is still taking Vitamin D supplementation. DVT prophylaxis is completed. The patient's  past medical history, medications, allergies,  family history, social history, and review of systems have been reviewed, and dated and are recorded in the chart under the 'MEDIA\" tab. Physical Exam:   Ms. Louis Castañeda appears well, she is in no apparent distress, she demonstrates appropriate mood & affect. She is alert and oriented to person, place and time. Ht 5' 9\" (1.753 m)   Wt 156 lb (70.8 kg)   BMI 23.04 kg/m²     Right Knee: She has mild swelling which is resolving as expected. The incision is clean, dry, intact and nontender and without erythema or induration. Range of motion from 0 to 120 degrees. She has minimal pain with range of motion of the knee. Patient has 4/5 motor strength with flexion and extension of the right knee. She is able to do straight leg raises without difficulty. Patient is wearing her bilateral PAUL stockings. There is minimal right lower extremity edema. She is neurovascularly intact distally. Impression:    Diagnosis Orders   1. 8/31/21 RIGHT TKA  oxyCODONE-acetaminophen (PERCOCET) 5-325 MG per tablet       Plan: At this time, she will continue physical therapy. She is given a refill of oxycodone and has been weaning down on these.   Follow up will be in 5 week(s) and radiology of the right knee will be obtained. Patient will return to the office sooner for worsening or changes in symptoms. Néstor Delvalle was informed of the results of any imaging. We discussed her postop course to date and a time was given to answer questions. She understand and accepts this course of care. Electronically signed by Jasiel Ramirez PA-C on 76/0/9819  Board Certified [facility]    Please note that portions of this note were completed with a voice recognition program.  Efforts were made to edit the dictations but occasionally words are mis-transcribed.

## 2021-10-07 ENCOUNTER — OFFICE VISIT (OUTPATIENT)
Dept: FAMILY MEDICINE CLINIC | Age: 66
End: 2021-10-07
Payer: MEDICARE

## 2021-10-07 ENCOUNTER — TELEPHONE (OUTPATIENT)
Dept: FAMILY MEDICINE CLINIC | Age: 66
End: 2021-10-07

## 2021-10-07 VITALS
HEART RATE: 63 BPM | WEIGHT: 158 LBS | SYSTOLIC BLOOD PRESSURE: 140 MMHG | HEIGHT: 68 IN | TEMPERATURE: 97.1 F | OXYGEN SATURATION: 96 % | BODY MASS INDEX: 23.95 KG/M2 | DIASTOLIC BLOOD PRESSURE: 80 MMHG

## 2021-10-07 DIAGNOSIS — Z72.0 TOBACCO ABUSE: ICD-10-CM

## 2021-10-07 DIAGNOSIS — G47.09 OTHER INSOMNIA: ICD-10-CM

## 2021-10-07 DIAGNOSIS — Z12.31 ENCOUNTER FOR SCREENING MAMMOGRAM FOR MALIGNANT NEOPLASM OF BREAST: ICD-10-CM

## 2021-10-07 DIAGNOSIS — Z00.00 ENCOUNTER FOR ANNUAL WELLNESS VISIT (AWV) IN MEDICARE PATIENT: Primary | ICD-10-CM

## 2021-10-07 DIAGNOSIS — F41.9 ANXIETY: ICD-10-CM

## 2021-10-07 PROCEDURE — G0439 PPPS, SUBSEQ VISIT: HCPCS | Performed by: NURSE PRACTITIONER

## 2021-10-07 RX ORDER — ALPRAZOLAM 0.5 MG/1
TABLET ORAL
Qty: 30 TABLET | Refills: 0 | Status: SHIPPED | OUTPATIENT
Start: 2021-10-07 | End: 2021-11-09 | Stop reason: SDUPTHER

## 2021-10-07 ASSESSMENT — PATIENT HEALTH QUESTIONNAIRE - PHQ9
SUM OF ALL RESPONSES TO PHQ QUESTIONS 1-9: 0
SUM OF ALL RESPONSES TO PHQ9 QUESTIONS 1 & 2: 0
SUM OF ALL RESPONSES TO PHQ QUESTIONS 1-9: 0
SUM OF ALL RESPONSES TO PHQ QUESTIONS 1-9: 0
1. LITTLE INTEREST OR PLEASURE IN DOING THINGS: 0
2. FEELING DOWN, DEPRESSED OR HOPELESS: 0

## 2021-10-07 NOTE — TELEPHONE ENCOUNTER
Pharmacy is calling to let Edgardo Rangel know that the patient gets Percocet from . They want a verbal okay.       Please Advise

## 2021-10-07 NOTE — PROGRESS NOTES
[facility]. Orthopedics- Dr. Queta Ag  Pain Management- Dr. Ritika Campbell      Immunizations: Reviewed with patient. Due for influenza    Health Maintenance Due   Topic Date Due    Low dose CT lung screening  Never done    Breast cancer screen  12/04/2017    Annual Wellness Visit (AWV)  Never done    Flu vaccine (1) 09/01/2021       CHRONIC CONDITIONS   Endocrine  Hypoglycemia  Nervous and Auditory  Hearing problem of both ears  Musculoskeletal and Integument  Osteoarthritis  Cervical disc disease  Polyarthropathy, multiple sites  Other spondylosis, lumbar region  Bilateral primary osteoarthritis of knee  Other  Back pain  Hypertension  Fibromyalgia  Macular degeneration  Tobacco dependence  Other insomnia  Anxiety  Chronic, continuous use of opioids  Mixed hyperlipidemia        Physical Exam:    Body mass index is 24.38 kg/m². Vitals:    10/07/21 0740   BP: (!) 140/80   Site: Right Upper Arm   Position: Sitting   Cuff Size: Medium Adult   Pulse: 63   Temp: 97.1 °F (36.2 °C)   SpO2: 96%   Weight: 158 lb (71.7 kg)   Height: 5' 7.5\" (1.715 m)     Wt Readings from Last 3 Encounters:   10/07/21 158 lb (71.7 kg)   10/06/21 156 lb (70.8 kg)   09/13/21 142 lb (64.4 kg)       GENERAL:Alert and oriented x 4 NAD, no acute distress, well hydrated, well developed. LUNG:clear to auscultation bilaterally with normal respiratory effort  CV: Normal heart sounds, regular rate and rhythm without murmurs  EXTREMETY: no loss of hair, no edema, normal pedal pulses bilaterally    Was the timed get up and go unsteady or longer than 20 seconds: Yes, pt post R knee replacement    Vision Screen for Initial Exam: no    EKG for Initial Exam at 71 (): no    AAA U/S screen for men 65-75 who smoked (): not applicable    Assessment/Plan:    Randa was seen today for medicare awv.     Diagnoses and all orders for this visit:    Encounter for annual wellness visit (AWV) in [place] patient  Advised routine dental and vision  Advised living will  Advised influenza vaccination  Tobacco abuse  -     CT Lung Screen (Initial or Annual); Future    Encounter for screening mammogram for malignant neoplasm of breast  -     CRISTOPHER DIGITAL SCREEN W OR WO CAD BILATERAL;  Future    Anxiety  OARRS revewied  -     ALPRAZolam (XANAX) 0.5 MG tablet; TAKE 1 TABLET BY MOUTH EVERY NIGHT AS NEEDED FOR SLEEP OR ANXIETY    Other insomnia  -     ALPRAZolam (XANAX) 0.5 MG tablet; TAKE 1 TABLET BY MOUTH EVERY NIGHT AS NEEDED FOR SLEEP OR ANXIETY

## 2021-10-18 ENCOUNTER — TELEPHONE (OUTPATIENT)
Dept: ORTHOPEDIC SURGERY | Age: 66
End: 2021-10-18

## 2021-10-18 DIAGNOSIS — M17.11 PRIMARY OSTEOARTHRITIS OF RIGHT KNEE: ICD-10-CM

## 2021-10-18 RX ORDER — OXYCODONE HYDROCHLORIDE AND ACETAMINOPHEN 5; 325 MG/1; MG/1
1 TABLET ORAL EVERY 6 HOURS PRN
Qty: 28 TABLET | Refills: 0 | Status: SHIPPED | OUTPATIENT
Start: 2021-10-18 | End: 2021-11-10 | Stop reason: SDUPTHER

## 2021-10-18 NOTE — TELEPHONE ENCOUNTER
Prescription Refill     Medication Name:  [address] [place] [address]  Pharmacy: [facility]  Patient Contact Number:  [phone]    PATIENT HAS ENOUGH MEDICATION FOR 2 DAYS    CALL BACK PATIENT AT THE ABOVE NUMBER

## 2021-11-09 DIAGNOSIS — G47.09 OTHER INSOMNIA: ICD-10-CM

## 2021-11-09 DIAGNOSIS — F41.9 ANXIETY: ICD-10-CM

## 2021-11-09 RX ORDER — ALPRAZOLAM 0.5 MG/1
TABLET ORAL
Qty: 30 TABLET | Refills: 0 | Status: SHIPPED | OUTPATIENT
Start: 2021-11-09 | End: 2021-12-07 | Stop reason: SDUPTHER

## 2021-11-09 NOTE — TELEPHONE ENCOUNTER
ALPRAZolam (XANAX) 0.5 MG tablet 30 tablet 0 10/7/2021 11/6/2021    Sig: TAKE 1 TABLET BY MOUTH EVERY NIGHT AS NEEDED FOR SLEEP OR ANXIETY          [place]

## 2021-11-09 NOTE — TELEPHONE ENCOUNTER
Medication:   Requested Prescriptions     Pending Prescriptions Disp Refills    ALPRAZolam (XANAX) 0.5 MG tablet 30 tablet 0     Sig: TAKE 1 TABLET BY MOUTH EVERY NIGHT AS NEEDED FOR SLEEP OR ANXIETY        Last Filled: 10/7/2021      Patient Phone Number: [phone] (home)     Last appt: 10/7/2021   Next appt: 1/10/2022    Last OARRS:   RX Monitoring 4/24/2019   Attestation The Prescription Monitoring Report for this patient was reviewed today. Periodic Controlled Substance Monitoring -   Chronic Pain > 50 MEDD Obtained or confirmened \"Consent of Opioid Use\" on file.

## 2021-11-10 ENCOUNTER — OFFICE VISIT (OUTPATIENT)
Dept: ORTHOPEDIC SURGERY | Age: 66
End: 2021-11-10

## 2021-11-10 VITALS — HEIGHT: 67 IN | WEIGHT: 158 LBS | BODY MASS INDEX: 24.8 KG/M2

## 2021-11-10 DIAGNOSIS — M17.11 PRIMARY OSTEOARTHRITIS OF RIGHT KNEE: Primary | ICD-10-CM

## 2021-11-10 PROCEDURE — 99024 POSTOP FOLLOW-UP VISIT: CPT | Performed by: PHYSICIAN ASSISTANT

## 2021-11-10 RX ORDER — OXYCODONE HYDROCHLORIDE AND ACETAMINOPHEN 5; 325 MG/1; MG/1
1 TABLET ORAL EVERY 8 HOURS PRN
Qty: 21 TABLET | Refills: 0 | Status: SHIPPED | OUTPATIENT
Start: 2021-11-10 | End: 2021-11-17

## 2021-11-10 NOTE — PROGRESS NOTES
Patient Name: Corona Dubois Record Number: [id number]  YOB: 1955  Date of Encounter: 11/10/2021     Chief Complaint   Patient presents with    Post-Op Check     Right TKA 8/31/21, slowly improving, has burning sensation to medial and lateral knee. Feels she is pushing herself too hard with exercises        Total Knee Follow-up  Randa OSCAR Rosa Shaikh is here for 10 weeks post right total knee arthroplasty follow-up. Pain is controlled with occasional Percocet. The patient denies fever, wound drainage, increasing redness, pus, increasing pain, increasing swelling. Post op problems reported: none. She is ambulating using a straight cane. She is finished working with physical therapy. Patient is still taking Vitamin D supplementation. DVT prophylaxis is completed. The patient's  past medical history, medications, allergies,  family history, social history, and review of systems have been reviewed, and dated and are recorded in the chart under the 'MEDIA\" tab. Physical Exam:   Ms. Aminata Perkins appears well, she is in no apparent distress, she demonstrates appropriate mood & affect. She is alert and oriented to person, place and time. Ht 5' 7\" (1.702 m)   Wt 158 lb (71.7 kg)   BMI 24.75 kg/m²     Right Knee: She has mild swelling which is resolving as expected. The incision is clean, dry, intact and nontender and without erythema or induration. Range of motion from 0 to 115 degrees. She has mild pain with range of motion of the knee. Patient has 4+/5 motor strength with flexion and extension of the right knee. She is able to do straight leg raises without difficulty. Patient is wearing her bilateral PAUL stockings. There is minimal right lower extremity edema. She is neurovascularly intact distally. Radiology:  X-rays obtained and reviewed in office:  Views: 3 views right knee. Impression: Stable total knee arthroplasty with satisfactory alignment.  There is no evidence of loosening or subsidence. Orders:  Orders Placed This Encounter   Procedures    XR KNEE RIGHT (3 VIEWS)     Impression:   Status post right total knee arthroplasty and doing very well. Plan:  Patient has finished formal physical therapy. She will continue with home exercises and stretches. She is still having moderate pain, worse at night. She is given 1 last refill of oxycodone. Follow up will be in 5 week(s) and no radiology should be warranted. Patient will return to the office sooner for worsening or changes in symptoms. Osiel Perrin was informed of the results of any imaging. We discussed her postop course to date and a time was given to answer questions. She understand and accepts this course of care. Electronically signed by Fernando Crocker PA-C on 70/01/6873  Board Certified [facility]    Please note that portions of this note were completed with a voice recognition program.  Efforts were made to edit the dictations but occasionally words are mis-transcribed.

## 2021-12-07 DIAGNOSIS — G47.09 OTHER INSOMNIA: ICD-10-CM

## 2021-12-07 DIAGNOSIS — F41.9 ANXIETY: ICD-10-CM

## 2021-12-07 RX ORDER — ALPRAZOLAM 0.5 MG/1
TABLET ORAL
Qty: 30 TABLET | Refills: 0 | Status: SHIPPED | OUTPATIENT
Start: 2021-12-07 | End: 2021-12-29 | Stop reason: SDUPTHER

## 2021-12-07 NOTE — TELEPHONE ENCOUNTER
Medication:   Requested Prescriptions     Pending Prescriptions Disp Refills    ALPRAZolam (XANAX) 0.5 MG tablet 30 tablet 0     Sig: TAKE 1 TABLET BY MOUTH EVERY NIGHT AS NEEDED FOR SLEEP OR ANXIETY        Last Filled: 11/9/2021     Patient Phone Number: [phone] (home)     Last appt: 10/7/2021   Next appt: 1/10/2022    Last OARRS:   RX Monitoring 4/24/2019   Attestation The Prescription Monitoring Report for this patient was reviewed today. Periodic Controlled Substance Monitoring -   Chronic Pain > 50 MEDD Obtained or confirmened \"Consent of Opioid Use\" on file.

## 2021-12-07 NOTE — TELEPHONE ENCOUNTER
ALPRAZolam (XANAX) 0.5 MG tablet [[id number]    [facility] [place] [id number]   Phone:  [phone]  Fax:  [phone]

## 2021-12-13 RX ORDER — METOPROLOL SUCCINATE 25 MG/1
TABLET, EXTENDED RELEASE ORAL
Qty: 180 TABLET | Refills: 0 | Status: SHIPPED | OUTPATIENT
Start: 2021-12-13 | End: 2022-01-10 | Stop reason: ALTCHOICE

## 2021-12-13 RX ORDER — RAMIPRIL 10 MG/1
CAPSULE ORAL
Qty: 90 CAPSULE | Refills: 0 | Status: SHIPPED | OUTPATIENT
Start: 2021-12-13 | End: 2022-03-11

## 2021-12-13 NOTE — TELEPHONE ENCOUNTER
Medication:   Requested Prescriptions     Pending Prescriptions Disp Refills    ramipril (ALTACE) 10 MG capsule [Pharmacy Med Name: RAMIPRIL 10MG CAPSULES] 90 capsule 0     Sig: TAKE 1 CAPSULE BY MOUTH EVERY DAY    metoprolol succinate (TOPROL XL) 25 MG extended release tablet [Pharmacy Med Name: METOPROLOL ER SUCCINATE 25MG TABS] 180 tablet 0     Sig: TAKE 1 TABLET BY MOUTH TWICE DAILY        Last Filled: 9/16/2021      Patient Phone Number: [phone] (home)     Last appt: 10/7/2021   Next appt: 1/10/2022    Last OARRS:   RX Monitoring 4/24/2019   Attestation The Prescription Monitoring Report for this patient was reviewed today. Periodic Controlled Substance Monitoring -   Chronic Pain > 50 MEDD Obtained or confirmened \"Consent of Opioid Use\" on file.

## 2021-12-22 ENCOUNTER — OFFICE VISIT (OUTPATIENT)
Dept: ORTHOPEDIC SURGERY | Age: 66
End: 2021-12-22
Payer: MEDICARE

## 2021-12-22 VITALS — WEIGHT: 154 LBS | BODY MASS INDEX: 24.17 KG/M2 | HEIGHT: 67 IN

## 2021-12-22 DIAGNOSIS — M17.11 PRIMARY OSTEOARTHRITIS OF RIGHT KNEE: Primary | ICD-10-CM

## 2021-12-22 PROCEDURE — 99213 OFFICE O/P EST LOW 20 MIN: CPT | Performed by: PHYSICIAN ASSISTANT

## 2021-12-22 NOTE — PROGRESS NOTES
Patient Name: Sonia Hernandez  Medical Record Number: [id number]  YOB: 1955  Date of Encounter: 12/22/2021     Chief Complaint   Patient presents with    Follow-up     Right TKA 8/31/21 overall doing much better, but still has good an bad days        Total Knee Follow-up  Sonia Hernandez is here for 16 weeks post right total knee arthroplasty follow-up. Patient reports minimal right knee pain. The patient denies fever, wound drainage, increasing redness, pus, increasing pain, increasing swelling. Post op problems reported: She states she is still having a lot of burning down her right thigh and lower leg which she feels could be coming from her back. She has received spinal injections with Dr. Julia Rees in the past. She is ambulating without assistive device. .     She is finished working with physical therapy. Patient is still taking Vitamin D supplementation. DVT prophylaxis is completed. The patient's  past medical history, medications, allergies,  family history, social history, and review of systems have been reviewed, and dated and are recorded in the chart under the 'MEDIA\" tab. Physical Exam:   Ms. Sonia Hernandez appears well, she is in no apparent distress, she demonstrates appropriate mood & affect. She is alert and oriented to person, place and time. Ht 5' 7\" (1.702 m)   Wt 154 lb (69.9 kg)   BMI 24.12 kg/m²     Right Knee: She has no swelling. The incision is clean, dry, intact and nontender and without erythema or induration. Range of motion from 0 to 120 degrees. She has no pain with range of motion of the knee. Patient has 4+/5 motor strength with flexion and extension of the right knee. She is able to do straight leg raises without difficulty. Patient is wearing her bilateral PAUL stockings. There is no right lower extremity edema. She is neurovascularly intact distally.        Impression:   Diagnosis Orders   1. 8/31/21 RIGHT TKA         Plan:  Patient is doing very well 16 weeks status post right knee arthroplasty. She has finished physical therapy. Patient's pain is well controlled. She is having some burning down her right thigh and lower leg which I feel is likely coming from her back. She will plan on following back up with her spine surgeon to discuss repeat spinal injections. She is not using any assistive device for ambulation. She is advised to stay active and continue home exercises. She was reminded about the need for antibiotic prophylaxis before dental cleaning, colonoscopy and other invasive procedures for the first year after joint replacement. She was reminded to be very cautious during inclement weather. Patient was advised to return in 1 year for repeat imaging to ensure there is no hardware loosening or other concerns. Patient is given strict instructions to return before that time if she has any problems or concerns. Sari Torres was informed of the results of any imaging. We discussed her postop course to date and a time was given to answer questions. She understand and accepts this course of care. Electronically signed by Osvaldo Hernandez PA-C on 16/91/8913  Board Certified [facility]    Please note that portions of this note were completed with a voice recognition program.  Efforts were made to edit the dictations but occasionally words are mis-transcribed.

## 2021-12-23 DIAGNOSIS — M13.0 POLYARTHROPATHY, MULTIPLE SITES: ICD-10-CM

## 2021-12-23 RX ORDER — GABAPENTIN 300 MG/1
CAPSULE ORAL
Qty: 180 CAPSULE | Refills: 1 | Status: SHIPPED | OUTPATIENT
Start: 2021-12-23 | End: 2022-06-10

## 2021-12-23 NOTE — TELEPHONE ENCOUNTER
Medication:   Requested Prescriptions     Pending Prescriptions Disp Refills    gabapentin (NEURONTIN) 300 MG capsule [Pharmacy Med Name: GABAPENTIN 300MG CAPSULES] 180 capsule 1     Sig: TAKE 1 CAPSULE BY MOUTH THREE TIMES DAILY      Last Filled:      Patient Phone Number: [phone] (home)     Last appt: 10/7/2021   Next appt: 1/10/2022    Last OARRS:   RX Monitoring 4/24/2019   Attestation The Prescription Monitoring Report for this patient was reviewed today. Periodic Controlled Substance Monitoring -   Chronic Pain > 50 MEDD Obtained or confirmened \"Consent of Opioid Use\" on file.      PDMP Monitoring:    Last PDMP Elías Bartholomew as Reviewed [facility]):  Review User Review Instant Review Result   Paris Ag 10/7/2021  7:44 AM Reviewed PDMP [1]     Preferred Pharmacy:   [address]  [facility] [address]  Phone: [phone] Fax: [phone]

## 2021-12-29 DIAGNOSIS — F41.9 ANXIETY: ICD-10-CM

## 2021-12-29 DIAGNOSIS — G47.09 OTHER INSOMNIA: ICD-10-CM

## 2021-12-29 RX ORDER — ALPRAZOLAM 0.5 MG/1
TABLET ORAL
Qty: 30 TABLET | Refills: 0 | Status: SHIPPED | OUTPATIENT
Start: 2022-01-07 | End: 2022-02-14 | Stop reason: SDUPTHER

## 2022-01-03 DIAGNOSIS — I10 HYPERTENSION, UNSPECIFIED TYPE: ICD-10-CM

## 2022-01-03 RX ORDER — PRAVASTATIN SODIUM 20 MG
20 TABLET ORAL EVERY EVENING
Qty: 90 TABLET | Refills: 0 | Status: SHIPPED | OUTPATIENT
Start: 2022-01-03 | End: 2022-04-05

## 2022-01-10 ENCOUNTER — OFFICE VISIT (OUTPATIENT)
Dept: FAMILY MEDICINE CLINIC | Age: 67
End: 2022-01-10
Payer: MEDICARE

## 2022-01-10 VITALS
WEIGHT: 158 LBS | TEMPERATURE: 97.3 F | OXYGEN SATURATION: 96 % | HEART RATE: 63 BPM | DIASTOLIC BLOOD PRESSURE: 86 MMHG | SYSTOLIC BLOOD PRESSURE: 179 MMHG | BODY MASS INDEX: 24.75 KG/M2

## 2022-01-10 DIAGNOSIS — I10 HYPERTENSION, UNSPECIFIED TYPE: Primary | ICD-10-CM

## 2022-01-10 DIAGNOSIS — E78.2 MIXED HYPERLIPIDEMIA: ICD-10-CM

## 2022-01-10 DIAGNOSIS — I10 PRIMARY HYPERTENSION: Primary | ICD-10-CM

## 2022-01-10 DIAGNOSIS — G47.09 OTHER INSOMNIA: ICD-10-CM

## 2022-01-10 DIAGNOSIS — E55.9 VITAMIN D INSUFFICIENCY: ICD-10-CM

## 2022-01-10 PROCEDURE — 99214 OFFICE O/P EST MOD 30 MIN: CPT | Performed by: NURSE PRACTITIONER

## 2022-01-10 RX ORDER — METOPROLOL SUCCINATE 50 MG/1
50 TABLET, EXTENDED RELEASE ORAL 2 TIMES DAILY
Qty: 180 TABLET | Refills: 0 | Status: SHIPPED | OUTPATIENT
Start: 2022-01-10 | End: 2022-02-24 | Stop reason: ALTCHOICE

## 2022-01-10 NOTE — PROGRESS NOTES
Sabino Blackburn  : 1955  Encounter date: 1/10/2022    This dominick 78 y.o. female who presents with  Chief Complaint   Patient presents with    3 Month Follow-Up     med check       History of present illness:    HPI Pt is 78year old female for follow up on insomnia with xanax. 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 Pt reports knee replacement is doing well. Upcoming appt with ortho for discussion of other knee and follow up. Pt reports BP is usually elevated from stress related to family, denies missing dosages, does not routinely monitor at home. Continues to smoke. Pt is due for labs in April. Current Outpatient Medications on File Prior to Visit   Medication Sig Dispense Refill    pravastatin (PRAVACHOL) 20 MG tablet TAKE 1 TABLET BY MOUTH EVERY EVENING 90 tablet 0    ALPRAZolam (XANAX) 0.5 MG tablet TAKE 1 TABLET BY MOUTH EVERY NIGHT AS NEEDED FOR SLEEP OR ANXIETY 30 tablet 0    gabapentin (NEURONTIN) 300 MG capsule TAKE 1 CAPSULE BY MOUTH THREE TIMES DAILY 180 capsule 1    ramipril (ALTACE) 10 MG capsule TAKE 1 CAPSULE BY MOUTH EVERY DAY 90 capsule 0    furosemide (LASIX) 80 MG tablet TAKE 1 TABLET BY MOUTH EVERY DAY 90 tablet 1    ibuprofen (ADVIL;MOTRIN) 200 MG tablet Take 200 mg by mouth every 6 hours as needed for Pain       VITAMIN D PO Take by mouth      acetaminophen (TYLENOL) 500 MG tablet Take 1,000 mg by mouth 3 times daily       Multiple Vitamins-Minerals (THERAPEUTIC MULTIVITAMIN-MINERALS) tablet Take 1 tablet by mouth daily Centrum       No current facility-administered medications on file prior to visit.       Allergies   Allergen Reactions    Darvocet [Propoxyphene N-Acetaminophen] Hives    Lipitor [Atorvastatin]      Leg cramping    Diclofenac Rash     Past Medical History:   Diagnosis Date    Anxiety     Fibromyalgia     Gout     Hyperlipidemia     Hypertension     Osteoarthritis       Past Surgical History: Procedure Laterality Date    APPENDECTOMY  02/13/2019    HC INJECTION PROCEDURE FOR SACROILIAC JOINT Right 08/31/2020    RIGHT SACROILIAC JOINT INJECTION AND RIGHT GREATER TROCHANTERIC BURSA INJECTION WITH FLUOROSCOPY (76874, 21530) performed by [facility]cie [facility]ayne[facility]    KNEE ARTHROSCOPY Right 2009    KNEE ARTHROSCOPY Left     PAIN MANAGEMENT PROCEDURE Right 02/12/2020    RIGHT L4 AND L5 TRANSFORAMINAL EPIDURAL STEROID INJECTION WITH FLUOROSCOPY (60873, 82560) performed by Dulcie Cockayne, MD at [facility] Right 12/02/2020    RIGHT L4 AND L5 TRANSFORAMINAL EPIDURAL STEROID INJECTION WITH FLUOROSCOPY performed by Dulcie Cockayne, MD at [facility] Right 8/31/2021    RIGHT ROBOTIC ASSISTED TOTAL KNEE ARTHROPLASTY [facility] AND NEPHEW ADVANCED (41762, 91745) performed by Es Christopher MD at [facility] History   Problem Relation Age of Onset    Diabetes Mother       Social History     Tobacco Use    Smoking status: Current Every Day Smoker     Packs/day: 1.50     Years: 40.00     Pack years: 60.00     Types: Cigarettes     Start date: 2/1/1990    Smokeless tobacco: Never Used   Substance Use Topics    Alcohol use: No        Review of Systems    Objective:    BP (!) 179/86   Pulse 63   Temp 97.3 °F (36.3 °C)   Wt 158 lb (71.7 kg)   SpO2 96%   BMI 24.75 kg/m²   Weight: 158 lb (71.7 kg)     BP Readings from Last 3 Encounters:   01/10/22 (!) 179/86   10/07/21 (!) 140/80   08/31/21 (!) 140/82     Wt Readings from Last 3 Encounters:   01/10/22 158 lb (71.7 kg)   12/22/21 154 lb (69.9 kg)   11/10/21 158 lb (71.7 kg)     BMI Readings from Last 3 Encounters:   01/10/22 24.75 kg/m²   12/22/21 24.12 kg/m²   11/10/21 24.75 kg/m²       Physical Exam  Vitals reviewed. Constitutional:       Appearance: Normal appearance. She is well-developed.    Cardiovascular:      Rate and Rhythm: Normal rate and regular rhythm. Pulses: Normal pulses. Heart sounds: Normal heart sounds. No murmur heard. Pulmonary:      Effort: Pulmonary effort is normal.      Breath sounds: Normal breath sounds. Musculoskeletal:      Right lower leg: No edema. Left lower leg: No edema. Skin:     General: Skin is warm and dry. Neurological:      Mental Status: She is alert and oriented to person, place, and time. Psychiatric:         Mood and Affect: Mood normal.         Assessment/Plan    1. Primary hypertension  Uncontrolled  Advised daily monitoring  Smoking cessation  Limit salt intake  - metoprolol succinate (TOPROL XL) 50 MG extended release tablet; Take 1 tablet by mouth 2 times daily  Dispense: 180 tablet; Refill: 0    2. Other insomnia  Controlled  OARRS reviewed  Next visit need to renew medication contract      Return in about 3 months (around 4/10/2022) for lab review, medication re-check, hypertension re-check. This dictation was generated by voice recognition computer software. Although all attempts are made to edit the dictation for accuracy, there may be errors in the transcription that are not intended.

## 2022-01-12 RX ORDER — FUROSEMIDE 80 MG
TABLET ORAL
Qty: 90 TABLET | Refills: 1 | Status: SHIPPED | OUTPATIENT
Start: 2022-01-12 | End: 2022-06-10

## 2022-01-12 NOTE — TELEPHONE ENCOUNTER
Medication:   Requested Prescriptions     Pending Prescriptions Disp Refills    furosemide (LASIX) 80 MG tablet [Pharmacy Med Name: FUROSEMIDE 80MG TABLETS] 90 tablet 1     Sig: TAKE 1 TABLET BY MOUTH EVERY DAY        Last Filled: 7/15/2021      Patient Phone Number: [phone] (home)     Last appt: 1/10/2022   Next appt: 4/11/2022    Last OARRS:   RX Monitoring 4/24/2019   Attestation The Prescription Monitoring Report for this patient was reviewed today. Periodic Controlled Substance Monitoring -   Chronic Pain > 50 MEDD Obtained or confirmened \"Consent of Opioid Use\" on file.

## 2022-02-09 ENCOUNTER — HOSPITAL ENCOUNTER (OUTPATIENT)
Age: 67
Discharge: HOME | End: 2022-02-09
Payer: MEDICARE

## 2022-02-09 ENCOUNTER — TELEPHONE (OUTPATIENT)
Dept: FAMILY MEDICINE CLINIC | Age: 67
End: 2022-02-09

## 2022-02-09 ENCOUNTER — HOSPITAL ENCOUNTER (OUTPATIENT)
Dept: CT IMAGING | Age: 67
Discharge: HOME | End: 2022-02-09
Payer: MEDICARE

## 2022-02-09 DIAGNOSIS — E78.2 MIXED HYPERLIPIDEMIA: ICD-10-CM

## 2022-02-09 DIAGNOSIS — I10 HYPERTENSION, UNSPECIFIED TYPE: ICD-10-CM

## 2022-02-09 DIAGNOSIS — E55.9 VITAMIN D INSUFFICIENCY: ICD-10-CM

## 2022-02-09 DIAGNOSIS — Z72.0 TOBACCO ABUSE: ICD-10-CM

## 2022-02-09 LAB
A/G RATIO: 2.3 (ref 1.1–2.2)
ALBUMIN SERPL-MCNC: 4.3 G/DL (ref 3.4–5)
ALP BLD-CCNC: 58 U/L (ref 40–129)
ALT SERPL-CCNC: 9 U/L (ref 10–40)
ANION GAP SERPL CALCULATED.3IONS-SCNC: 11 MMOL/L (ref 3–16)
AST SERPL-CCNC: 16 U/L (ref 15–37)
BASOPHILS ABSOLUTE: 0 K/UL (ref 0–0.2)
BASOPHILS RELATIVE PERCENT: 0.7 %
BILIRUB SERPL-MCNC: 0.7 MG/DL (ref 0–1)
BUN BLDV-MCNC: 13 MG/DL (ref 7–20)
CALCIUM SERPL-MCNC: 9.2 MG/DL (ref 8.3–10.6)
CHLORIDE BLD-SCNC: 102 MMOL/L (ref 99–110)
CHOLESTEROL, FASTING: 162 MG/DL (ref 0–199)
CO2: 28 MMOL/L (ref 21–32)
CREAT SERPL-MCNC: <0.5 MG/DL (ref 0.6–1.2)
CREATININE URINE: 24.2 MG/DL (ref 28–259)
EOSINOPHILS ABSOLUTE: 0.1 K/UL (ref 0–0.6)
EOSINOPHILS RELATIVE PERCENT: 2.7 %
GFR AFRICAN AMERICAN: >60
GFR NON-AFRICAN AMERICAN: >60
GLUCOSE FASTING: 87 MG/DL (ref 70–99)
HCT VFR BLD CALC: 42.5 % (ref 36–48)
HDLC SERPL-MCNC: 47 MG/DL (ref 40–60)
HEMOGLOBIN: 14.5 G/DL (ref 12–16)
LDL CHOLESTEROL CALCULATED: 94 MG/DL
LYMPHOCYTES ABSOLUTE: 1.5 K/UL (ref 1–5.1)
LYMPHOCYTES RELATIVE PERCENT: 30 %
MCH RBC QN AUTO: 31.1 PG (ref 26–34)
MCHC RBC AUTO-ENTMCNC: 34.1 G/DL (ref 31–36)
MCV RBC AUTO: 91.2 FL (ref 80–100)
MICROALBUMIN UR-MCNC: <1.2 MG/DL
MICROALBUMIN/CREAT UR-RTO: ABNORMAL MG/G (ref 0–30)
MONOCYTES ABSOLUTE: 0.5 K/UL (ref 0–1.3)
MONOCYTES RELATIVE PERCENT: 10.9 %
NEUTROPHILS ABSOLUTE: 2.7 K/UL (ref 1.7–7.7)
NEUTROPHILS RELATIVE PERCENT: 55.7 %
PDW BLD-RTO: 14.6 % (ref 12.4–15.4)
PLATELET # BLD: 149 K/UL (ref 135–450)
PMV BLD AUTO: 8.6 FL (ref 5–10.5)
POTASSIUM SERPL-SCNC: 3.8 MMOL/L (ref 3.5–5.1)
RBC # BLD: 4.66 M/UL (ref 4–5.2)
SODIUM BLD-SCNC: 141 MMOL/L (ref 136–145)
TOTAL PROTEIN: 6.2 G/DL (ref 6.4–8.2)
TRIGLYCERIDE, FASTING: 106 MG/DL (ref 0–150)
VITAMIN D 25-HYDROXY: 42.6 NG/ML
VLDLC SERPL CALC-MCNC: 21 MG/DL
WBC # BLD: 4.9 K/UL (ref 4–11)

## 2022-02-09 PROCEDURE — 71271 CT THORAX LUNG CANCER SCR C-: CPT

## 2022-02-09 PROCEDURE — 82043 UR ALBUMIN QUANTITATIVE: CPT

## 2022-02-09 PROCEDURE — 80053 COMPREHEN METABOLIC PANEL: CPT

## 2022-02-09 PROCEDURE — 36415 COLL VENOUS BLD VENIPUNCTURE: CPT

## 2022-02-09 PROCEDURE — 82570 ASSAY OF URINE CREATININE: CPT

## 2022-02-09 PROCEDURE — 85025 COMPLETE CBC W/AUTO DIFF WBC: CPT

## 2022-02-09 PROCEDURE — 82306 VITAMIN D 25 HYDROXY: CPT

## 2022-02-09 PROCEDURE — 80061 LIPID PANEL: CPT

## 2022-02-09 NOTE — TELEPHONE ENCOUNTER
----- Message from Stefanie marie sent at 2/9/2022 11:41 AM EST -----  Subject: Message to Provider    QUESTIONS  Information for Provider? Pt would like to have a sooner appt. and called   when labs arrive  ---------------------------------------------------------------------------  --------------  CALL BACK INFO  What is the best way for the office to contact you? OK to leave message on   voicemail  Preferred Call Back Phone Number? [phone]  ---------------------------------------------------------------------------  --------------  SCRIPT ANSWERS  Relationship to Patient?  Self

## 2022-02-10 ENCOUNTER — OFFICE VISIT (OUTPATIENT)
Dept: FAMILY MEDICINE CLINIC | Age: 67
End: 2022-02-10
Payer: MEDICARE

## 2022-02-10 VITALS
SYSTOLIC BLOOD PRESSURE: 168 MMHG | TEMPERATURE: 98.2 F | OXYGEN SATURATION: 98 % | DIASTOLIC BLOOD PRESSURE: 88 MMHG | HEART RATE: 71 BPM

## 2022-02-10 DIAGNOSIS — Z20.822 SUSPECTED COVID-19 VIRUS INFECTION: Primary | ICD-10-CM

## 2022-02-10 DIAGNOSIS — R11.0 NAUSEA: ICD-10-CM

## 2022-02-10 PROCEDURE — 99213 OFFICE O/P EST LOW 20 MIN: CPT | Performed by: NURSE PRACTITIONER

## 2022-02-10 RX ORDER — ONDANSETRON 4 MG/1
4 TABLET, FILM COATED ORAL 3 TIMES DAILY PRN
Qty: 15 TABLET | Refills: 0 | Status: SHIPPED | OUTPATIENT
Start: 2022-02-10 | End: 2022-03-24 | Stop reason: ALTCHOICE

## 2022-02-10 NOTE — PROGRESS NOTES
2/10/2022  Sarah Person (:  1955)    Allergies: Allergies   Allergen Reactions    Darvocet [Propoxyphene N-Acetaminophen] Hives    Lipitor [Atorvastatin]      Leg cramping    Diclofenac Rash     (review in Epic)    FLU/RESPIRATORY/COVID-19 CLINIC EVALUATION    HPI:   Chief Complaint   Patient presents with    Nausea    Cough      SYMPTOMS:    INSTRUCTIONS:  \"[x]\" Indicates a positive item  \"[]\" Indicates a negative item        Symptom duration, days:    Date symptoms started : __2 weeks____    [] 1   [] 2   [] 3   [] 4 - 7   [] 8 - 10   [] 11 - 13   [] >14    [] Fevers    [] Symptom (not measured)  [] Measured (Result:  degrees)  [] Chills  [x] Cough [] Dry [x] Productive - white   []Loss of Taste  [] Loss of Smell  []Decreased Appetite  [] Coughing up blood  }  [] Chest Congestion  [] Nasal Congestion  [] Runny  Nose  [] Sneezing  [] Feeling short of breath   []Sometimes    [] Frequently    [] All the time     [] Chest pain     [] Headaches  []Tolerable  [] Severe     [] Fatigue  [] Sore throat  [] Muscle aches  [x] Nausea - one week prior  [] Vomiting  []Unable to keep fluids down     [] Diarrhea  [] Mild  []Severe       [] Vaccinated for COVID 19  [] History of COVID 19 (Date:           )    [] OTHER SYMPTOMS: +Smoker. Has been taking Robitussin and Claritin with minimal relief. Biggest complaint is the nausea - has had a decrease in appetite. Episodes of nausea will last from 1 hour to 3 hours. Did have covid vaccine - no booster. No flu shot. Did recently have an adjustment in her blood pressure medications - blood pressures have slowly been improving, but her systolic number at home is still around 150. Symptom course:   [x] Worsening     [] Stable     [] Improving    RISK FACTORS:1INSTRUCTIONS:  \"[x]\" Indicates a positive item. Negative  for risk factors if not checked.     [] Close contact with a lab confirmed COVID-19 patient within 14 days of symptom onset  [] History of travel from affected geographical areas within 14 days of symptom onset    PHYSICAL EXAMINATION:    Vitals:    02/10/22 1310   BP: (!) 168/88   Site: Right Upper Arm   Position: Sitting   Cuff Size: Medium Adult   Pulse: 71   Temp: 98.2 °F (36.8 °C)   TempSrc: Tympanic   SpO2: 98%      [x] Alert  [x] Oriented to person/place/time    [x] No apparent distress   [] Toxic appearing  [] Face flushed appearing     [x] Normal Mood  [] Anxious appearing      [x] Sclera clear    [x] Pinna, TMs,  Canals normal bilaterally  [] TM Red  [] Right [] Left [] Bilateral  [] TM Bulging [] Right [] Left []  Billateral    [] Oropharynx [x] Clear [] Red [] Exudate [] Swollen    [x] No adenopathy [] Adenopathy __________    [x] Lungs clear with good movement and effort  [x] Breathing appears normal     [x] Speaks in complete sentences  [] Appears tachypneic   [] Wheezing           [] Rhonchi   [] Decreased    [x] CV RRR  [x] No Murmur  [] Murmur  [] Irregular  [] Tachycardic    [] OTHER:  ABD: Normal 1}    TESTS ORDERED:    [] POCT FLU  [] POCT STREP  [] COVID-19 Test sent  [] Appointment made at testing clinic for patient to get a COVID test.     TEST RESULTS:    POCT FLU test:  [] Positive  [] Negative  POCT STREP test:  [] Positive  [] Negative    ASSESSMENT:  [] Allergic Rhinitis  [] Asthma Exacerbation  [] Bronchitis  [] COPD Exacerbation  [] Gastroenteritis  [] Influenza  [] Sinusitis  [] Strep Throat [] Sore Throat  [] Viral URI   [] Possible COVID-19   [] Exposure to COVID -19  [] Positive for COVID  [] Screening for Viral Disease (COVID test no sx)    Randa was seen today for nausea and cough. Diagnoses and all orders for this visit:    Suspected COVID-19 virus infection  -     COVID-19    Nausea  -     ondansetron (ZOFRAN) 4 MG tablet;  Take 1 tablet by mouth 3 times daily as needed for Nausea or Vomiting    Other orders  -     COVID-19  -     COVID-19  -     COVID-19              [] Low risk for complications from COVID 19  [x] Moderate risk for complications from COVID 19  [x] High risk for complications from COVID 19    PLAN:    [x] Discharge home with written instructions for:  [] Flu management  [] Strep throat management  [] Viral respiratory illness management  [] Sinusitis management  [] Bronchitis Management  [x] Possible COVID-19 infection with self-quarantine and management of symptoms  [x] Follow-up with primary care physician or emergency department if worsens  [] Note given for work    [] Referred to emergency department for evaluation

## 2022-02-11 LAB — SARS-COV-2: DETECTED

## 2022-02-14 DIAGNOSIS — G47.09 OTHER INSOMNIA: ICD-10-CM

## 2022-02-14 DIAGNOSIS — F41.9 ANXIETY: ICD-10-CM

## 2022-02-14 RX ORDER — ALPRAZOLAM 0.5 MG/1
TABLET ORAL
Qty: 30 TABLET | Refills: 0 | Status: SHIPPED | OUTPATIENT
Start: 2022-02-14 | End: 2022-03-11 | Stop reason: SDUPTHER

## 2022-02-24 ENCOUNTER — OFFICE VISIT (OUTPATIENT)
Dept: FAMILY MEDICINE CLINIC | Age: 67
End: 2022-02-24
Payer: MEDICARE

## 2022-02-24 VITALS
TEMPERATURE: 98.1 F | WEIGHT: 156.8 LBS | SYSTOLIC BLOOD PRESSURE: 142 MMHG | BODY MASS INDEX: 24.56 KG/M2 | OXYGEN SATURATION: 95 % | DIASTOLIC BLOOD PRESSURE: 86 MMHG | HEART RATE: 79 BPM

## 2022-02-24 DIAGNOSIS — I10 PRIMARY HYPERTENSION: Primary | ICD-10-CM

## 2022-02-24 DIAGNOSIS — J01.10 ACUTE NON-RECURRENT FRONTAL SINUSITIS: ICD-10-CM

## 2022-02-24 DIAGNOSIS — I10 PRIMARY HYPERTENSION: ICD-10-CM

## 2022-02-24 PROCEDURE — 99213 OFFICE O/P EST LOW 20 MIN: CPT | Performed by: NURSE PRACTITIONER

## 2022-02-24 RX ORDER — CARVEDILOL 6.25 MG/1
TABLET ORAL
Qty: 180 TABLET | Refills: 0 | Status: SHIPPED | OUTPATIENT
Start: 2022-02-24 | End: 2022-06-11

## 2022-02-24 RX ORDER — AMOXICILLIN AND CLAVULANATE POTASSIUM 875; 125 MG/1; MG/1
1 TABLET, FILM COATED ORAL 2 TIMES DAILY
Qty: 20 TABLET | Refills: 0 | Status: SHIPPED | OUTPATIENT
Start: 2022-02-24 | End: 2022-03-06

## 2022-02-24 RX ORDER — CARVEDILOL 6.25 MG/1
6.25 TABLET ORAL 2 TIMES DAILY
Qty: 60 TABLET | Refills: 0 | Status: SHIPPED | OUTPATIENT
Start: 2022-02-24 | End: 2022-02-24

## 2022-02-24 NOTE — PROGRESS NOTES
Jackeline Lemon  : 1955  Encounter date: 2022    This dominick 78 y.o. female who presents with  Chief Complaint   Patient presents with    Hypertension     c/o elevated bp/after covid sx. History of present illness:    HPI Pt is 78year old female with recent diagnosis covid on 2/10/22 and reports lingering symptoms with concerns for elevated BP. Pt reports initially seen for nausea and cough. Cough is continued. Pt is currently taking lasix 80 mg daily, toprol 50 mg BID and ramipril 10 mg daily. Previous readings- 148/80, 168/88, 179/86, 148/90. Pt reports continued stress with family    Current Outpatient Medications on File Prior to Visit   Medication Sig Dispense Refill    ALPRAZolam (XANAX) 0.5 MG tablet TAKE 1 TABLET BY MOUTH EVERY NIGHT AS NEEDED FOR SLEEP OR ANXIETY 30 tablet 0    ondansetron (ZOFRAN) 4 MG tablet Take 1 tablet by mouth 3 times daily as needed for Nausea or Vomiting 15 tablet 0    furosemide (LASIX) 80 MG tablet TAKE 1 TABLET BY MOUTH EVERY DAY 90 tablet 1    pravastatin (PRAVACHOL) 20 MG tablet TAKE 1 TABLET BY MOUTH EVERY EVENING 90 tablet 0    gabapentin (NEURONTIN) 300 MG capsule TAKE 1 CAPSULE BY MOUTH THREE TIMES DAILY 180 capsule 1    ramipril (ALTACE) 10 MG capsule TAKE 1 CAPSULE BY MOUTH EVERY DAY 90 capsule 0    ibuprofen (ADVIL;MOTRIN) 200 MG tablet Take 200 mg by mouth every 6 hours as needed for Pain       VITAMIN D PO Take by mouth      acetaminophen (TYLENOL) 500 MG tablet Take 1,000 mg by mouth as needed       Multiple Vitamins-Minerals (THERAPEUTIC MULTIVITAMIN-MINERALS) tablet Take 1 tablet by mouth daily Centrum       No current facility-administered medications on file prior to visit.       Allergies   Allergen Reactions    Darvocet [Propoxyphene N-Acetaminophen] Hives    Lipitor [Atorvastatin]      Leg cramping    Diclofenac Rash     Past Medical History:   Diagnosis Date    Anxiety     Fibromyalgia     Gout     Hyperlipidemia     Hypertension     Osteoarthritis       Past Surgical History:   Procedure Laterality Date    APPENDECTOMY  02/13/2019    [facility]ED[facility] FOR SACROILIAC JOINT Right 08/31/2020    RIGHT SACROILIAC JOINT INJECTION AND RIGHT GREATER TROCHANTERIC BURSA INJECTION WITH FLUOROSCOPY (09648, 98618) performed by Damian Mckinney MD at [facility]    KNEE ARTHROSCOPY Right 2009    KNEE ARTHROSCOPY Left     PAIN MANAGEMENT PROCEDURE Right 02/12/2020    RIGHT L4 AND L5 TRANSFORAMINAL EPIDURAL STEROID INJECTION WITH FLUOROSCOPY (43738, 59951) performed by Damian Mckinney MD at [address] [place] [address] Right 12/02/2020    RIGHT L4 AND L5 TRANSFORAMINAL EPIDURAL STEROID INJECTION WITH FLUOROSCOPY performed by Damian Mckinney MD at [address] [place] [address] Right 8/31/2021    RIGHT ROBOTIC ASSISTED TOTAL KNEE ARTHROPLASTY [facility] AND NEPHEW ADVANCED (19590, 98532) performed by David Remy MD at [facility] History   Problem Relation Age of Onset    Diabetes Mother       Social History     Tobacco Use    Smoking status: Current Every Day Smoker     Packs/day: 1.50     Years: 40.00     Pack years: 60.00     Types: Cigarettes     Start date: 2/1/1990    Smokeless tobacco: Never Used   Substance Use Topics    Alcohol use: No        Review of Systems    Objective:    BP (!) 142/86   Pulse 79   Temp 98.1 °F (36.7 °C)   Wt 156 lb 12.8 oz (71.1 kg)   SpO2 95%   BMI 24.56 kg/m²   Weight: 156 lb 12.8 oz (71.1 kg)     BP Readings from Last 3 Encounters:   02/24/22 (!) 142/86   02/10/22 (!) 168/88   01/10/22 (!) 179/86     Wt Readings from Last 3 Encounters:   02/24/22 156 lb 12.8 oz (71.1 kg)   01/10/22 158 lb (71.7 kg)   12/22/21 154 lb (69.9 kg)     BMI Readings from Last 3 Encounters:   02/24/22 24.56 kg/m²   01/10/22 24.75 kg/m²   12/22/21 24.12 kg/m²       Physical Exam  Vitals reviewed.    Constitutional:       Appearance: Normal appearance. She is well-developed. Cardiovascular:      Rate and Rhythm: Normal rate and regular rhythm. Pulses: Normal pulses. Heart sounds: Normal heart sounds. No murmur heard. Pulmonary:      Effort: Pulmonary effort is normal.      Breath sounds: Normal breath sounds. Musculoskeletal:      Right lower leg: No edema. Left lower leg: No edema. Skin:     General: Skin is warm and dry. Neurological:      Mental Status: She is alert and oriented to person, place, and time. Psychiatric:         Mood and Affect: Mood normal.         Assessment/Plan    1. Primary hypertension  Uncontrolled  Advised daily monitoring  - carvedilol (COREG) 6.25 MG tablet; Take 1 tablet by mouth 2 times daily  Dispense: 60 tablet; Refill: 0    2. Acute non-recurrent frontal sinusitis  Advised OTC mucinex  - amoxicillin-clavulanate (AUGMENTIN) 875-125 MG per tablet; Take 1 tablet by mouth 2 times daily for 10 days  Dispense: 20 tablet; Refill: 0      Return in about 1 month (around 3/24/2022) for hypertension re-check, medication re-check. This dictation was generated by voice recognition computer software. Although all attempts are made to edit the dictation for accuracy, there may be errors in the transcription that are not intended.

## 2022-03-11 ENCOUNTER — TELEPHONE (OUTPATIENT)
Dept: FAMILY MEDICINE CLINIC | Age: 67
End: 2022-03-11

## 2022-03-11 DIAGNOSIS — F41.9 ANXIETY: ICD-10-CM

## 2022-03-11 DIAGNOSIS — G47.09 OTHER INSOMNIA: ICD-10-CM

## 2022-03-11 RX ORDER — RAMIPRIL 10 MG/1
CAPSULE ORAL
Qty: 90 CAPSULE | Refills: 0 | Status: SHIPPED | OUTPATIENT
Start: 2022-03-11 | End: 2022-06-08

## 2022-03-11 RX ORDER — ALPRAZOLAM 0.5 MG/1
TABLET ORAL
Qty: 30 TABLET | Refills: 0 | Status: SHIPPED | OUTPATIENT
Start: 2022-03-11 | End: 2022-04-12 | Stop reason: SDUPTHER

## 2022-03-11 NOTE — TELEPHONE ENCOUNTER
Patient calling for medication refill. Alprazolam (XANAX) 0.5 mg tablet    [facility] [address] Phone number [phone].     Last OV 2/24  Next OV 3/24

## 2022-03-11 NOTE — TELEPHONE ENCOUNTER
Medication:   Requested Prescriptions     Pending Prescriptions Disp Refills    ALPRAZolam (XANAX) 0.5 MG tablet 30 tablet 0     Sig: TAKE 1 TABLET BY MOUTH EVERY NIGHT AS NEEDED FOR SLEEP OR ANXIETY      Last Filled:  2/14/22    Patient Phone Number: [phone] (home)     Last appt: 2/24/2022   Next appt: 3/11/2022    Last OARRS:   RX Monitoring 4/24/2019   Attestation The Prescription Monitoring Report for this patient was reviewed today. Periodic Controlled Substance Monitoring -   Chronic Pain > 50 MEDD Obtained or confirmened \"Consent of Opioid Use\" on file.      PDMP Monitoring:    Last PDMP Kristin Gorman as Reviewed [facility]):  Review User Review Instant Review Result   Gunner Clink 2/24/2022  7:57 AM Reviewed PDMP [1]     Preferred Pharmacy:   [place] [address]  [address]  Phone: [phone] Fax: [phone]

## 2022-03-11 NOTE — TELEPHONE ENCOUNTER
Medication:   Requested Prescriptions     Pending Prescriptions Disp Refills    ramipril (ALTACE) 10 MG capsule [Pharmacy Med Name: RAMIPRIL 10MG CAPSULES] 90 capsule 0     Sig: TAKE 1 CAPSULE BY MOUTH EVERY DAY      Last Filled:      Patient Phone Number: [phone] (home)     Last appt: 2/24/2022   Next appt: 3/24/2022    Last OARRS:   RX Monitoring 4/24/2019   Attestation The Prescription Monitoring Report for this patient was reviewed today. Periodic Controlled Substance Monitoring -   Chronic Pain > 50 MEDD Obtained or confirmened \"Consent of Opioid Use\" on file.      PDMP Monitoring:    Last PDMP Nolberto Mountain as Reviewed [facility]):  Review User Review Instant Review Result   [facility] 2/24/2022  7:57 AM Reviewed PDMP [1]     Preferred Pharmacy:   [facility] [address] [place] [address]  [address]  Phone: [phone] Fax: [phone]

## 2022-03-24 ENCOUNTER — OFFICE VISIT (OUTPATIENT)
Dept: FAMILY MEDICINE CLINIC | Age: 67
End: 2022-03-24
Payer: MEDICARE

## 2022-03-24 VITALS
DIASTOLIC BLOOD PRESSURE: 84 MMHG | SYSTOLIC BLOOD PRESSURE: 138 MMHG | OXYGEN SATURATION: 95 % | HEART RATE: 62 BPM | WEIGHT: 155.13 LBS | TEMPERATURE: 97.2 F | BODY MASS INDEX: 24.3 KG/M2

## 2022-03-24 DIAGNOSIS — I10 PRIMARY HYPERTENSION: ICD-10-CM

## 2022-03-24 PROCEDURE — 99213 OFFICE O/P EST LOW 20 MIN: CPT | Performed by: NURSE PRACTITIONER

## 2022-03-24 NOTE — PROGRESS NOTES
Alejo French  : 1955  Encounter date: 3/24/2022    This dominick 78 y.o. female who presents with  Chief Complaint   Patient presents with    Hypertension     1mo follow up on blood pressure       History of present illness:    HPI   Pt is 78year old female for follow up on hypertension. Previous readings-  Vitals 2022 2022 2/10/2022 3/95/3150   SYSTOLIC 253 104 203 914   DIASTOLIC 86 80 88 86     Vitals 1/10/2022 2021 11/10/2021    SYSTOLIC 665   863   DIASTOLIC 90   80     Last month stopped Toprol, started coreg 6.25 mg BID. Pt denies side effects, reports better controlled, readings 130's/80's. No other concerns. Reports working on less stress at home and with family. Discussed anti-depressant previously mentioned, not needing at this time.       Current Outpatient Medications on File Prior to Visit   Medication Sig Dispense Refill    ramipril (ALTACE) 10 MG capsule TAKE 1 CAPSULE BY MOUTH EVERY DAY 90 capsule 0    ALPRAZolam (XANAX) 0.5 MG tablet TAKE 1 TABLET BY MOUTH EVERY NIGHT AS NEEDED FOR SLEEP OR ANXIETY 30 tablet 0    carvedilol (COREG) 6.25 MG tablet TAKE 1 TABLET BY MOUTH TWICE DAILY 180 tablet 0    furosemide (LASIX) 80 MG tablet TAKE 1 TABLET BY MOUTH EVERY DAY 90 tablet 1    pravastatin (PRAVACHOL) 20 MG tablet TAKE 1 TABLET BY MOUTH EVERY EVENING 90 tablet 0    gabapentin (NEURONTIN) 300 MG capsule TAKE 1 CAPSULE BY MOUTH THREE TIMES DAILY 180 capsule 1    ibuprofen (ADVIL;MOTRIN) 200 MG tablet Take 200 mg by mouth every 6 hours as needed for Pain       VITAMIN D PO Take by mouth      acetaminophen (TYLENOL) 500 MG tablet Take 1,000 mg by mouth as needed       Multiple Vitamins-Minerals (THERAPEUTIC MULTIVITAMIN-MINERALS) tablet Take 1 tablet by mouth daily Centrum      ondansetron (ZOFRAN) 4 MG tablet Take 1 tablet by mouth 3 times daily as needed for Nausea or Vomiting 15 tablet 0     No current facility-administered medications on file prior to visit.       Allergies   Allergen Reactions    Darvocet [Propoxyphene N-Acetaminophen] Hives    Lipitor [Atorvastatin]      Leg cramping    Diclofenac Rash     Past Medical History:   Diagnosis Date    Anxiety     Fibromyalgia     Gout     Hyperlipidemia     Hypertension     Osteoarthritis       Past Surgical History:   Procedure Laterality Date    APPENDECTOMY  02/13/2019    [facility] INJECTION PROCEDURE FOR SACROILIAC JOINT Right 08/31/2020    RIGHT SACROILIAC JOINT INJECTION AND RIGHT GREATER TROCHANTERIC BURSA INJECTION WITH FLUOROSCOPY (00382, 49768) performed by [facility]nette Horton [facility] at [facility]    KNEE ARTHROSCOPY Right 2009    KNEE ARTHROSCOPY Left     PAIN MANAGEMENT PROCEDURE Right 02/12/2020    RIGHT L4 AND L5 TRANSFORAMINAL EPIDURAL STEROID INJECTION WITH FLUOROSCOPY (36691, 33603) performed by Annette Horton MD at [address] [place] [address] Right 12/02/2020    RIGHT L4 AND L5 TRANSFORAMINAL EPIDURAL STEROID INJECTION WITH FLUOROSCOPY performed by Annette Horton MD at [address] [place] [address] Right 8/31/2021    RIGHT ROBOTIC ASSISTED TOTAL KNEE ARTHROPLASTY - PICHARDO AND NEPHEW ADVANCED (76229, 50184) performed by Hudson Cuevas MD at [facility] OR      Family History   Problem Relation Age of Onset    Diabetes Mother       Social History     Tobacco Use    Smoking status: Current Every Day Smoker     Packs/day: 1.50     Years: 40.00     Pack years: 60.00     Types: Cigarettes     Start date: 2/1/1990    Smokeless tobacco: Never Used   Substance Use Topics    Alcohol use: No        Review of Systems    Objective:    /84 (Site: Right Upper Arm, Position: Sitting, Cuff Size: Small Adult)   Pulse 62   Temp 97.2 °F (36.2 °C)   Wt 155 lb 2 oz (70.4 kg)   SpO2 95%   BMI 24.30 kg/m²   Weight: 155 lb 2 oz (70.4 kg)     BP Readings from Last 3 Encounters:   03/24/22 138/84   02/24/22 (!) 142/86   02/10/22 (!) 168/88 Wt Readings from Last 3 Encounters:   03/24/22 155 lb 2 oz (70.4 kg)   02/24/22 156 lb 12.8 oz (71.1 kg)   01/10/22 158 lb (71.7 kg)     BMI Readings from Last 3 Encounters:   03/24/22 24.30 kg/m²   02/24/22 24.56 kg/m²   01/10/22 24.75 kg/m²       Physical Exam  Vitals reviewed. Constitutional:       Appearance: Normal appearance. She is well-developed. Cardiovascular:      Rate and Rhythm: Normal rate and regular rhythm. Pulses: Normal pulses. Heart sounds: Normal heart sounds. No murmur heard. Pulmonary:      Effort: Pulmonary effort is normal.      Breath sounds: Normal breath sounds. Musculoskeletal:      Right lower leg: No edema. Left lower leg: No edema. Skin:     General: Skin is warm and dry. Neurological:      Mental Status: She is alert and oriented to person, place, and time. Psychiatric:         Mood and Affect: Mood normal.         Assessment/Plan    1. Primary hypertension  Controlled  Continue monitoring daily  Low salt diet  Low stress      Return in about 3 months (around 6/24/2022) for medication re-check. This dictation was generated by voice recognition computer software. Although all attempts are made to edit the dictation for accuracy, there may be errors in the transcription that are not intended.

## 2022-04-03 DIAGNOSIS — I10 HYPERTENSION, UNSPECIFIED TYPE: ICD-10-CM

## 2022-04-04 NOTE — TELEPHONE ENCOUNTER
Medication:   Requested Prescriptions     Pending Prescriptions Disp Refills    pravastatin (PRAVACHOL) 20 MG tablet [Pharmacy Med Name: PRAVASTATIN 20MG TABLETS] 90 tablet 0     Sig: TAKE 1 TABLET BY MOUTH EVERY EVENING          Patient Phone Number: [phone] (home)     Last appt: 3/24/2022   Next appt: 6/23/2022    Last OARRS:   RX Monitoring 4/24/2019   Attestation The Prescription Monitoring Report for this patient was reviewed today. Periodic Controlled Substance Monitoring -   Chronic Pain > 50 MEDD Obtained or confirmened \"Consent of Opioid Use\" on file.      PDMP Monitoring:    Last PDMP Hortensia Morris as Reviewed [facility]):  Review User Review Instant Review Result   Neto Fatima 3/24/2022  8:00 AM Reviewed PDMP [1]     Preferred Pharmacy:   [address]  [place] [address]  Phone: [phone] Fax: [phone]

## 2022-04-05 RX ORDER — PRAVASTATIN SODIUM 20 MG
20 TABLET ORAL EVERY EVENING
Qty: 90 TABLET | Refills: 0 | Status: SHIPPED | OUTPATIENT
Start: 2022-04-05 | End: 2022-07-05

## 2022-04-07 DIAGNOSIS — I10 PRIMARY HYPERTENSION: ICD-10-CM

## 2022-04-08 RX ORDER — METOPROLOL SUCCINATE 50 MG/1
TABLET, EXTENDED RELEASE ORAL
Qty: 180 TABLET | Refills: 0 | Status: SHIPPED | OUTPATIENT
Start: 2022-04-08 | End: 2022-06-23 | Stop reason: ALTCHOICE

## 2022-04-12 DIAGNOSIS — F41.9 ANXIETY: ICD-10-CM

## 2022-04-12 DIAGNOSIS — G47.09 OTHER INSOMNIA: ICD-10-CM

## 2022-04-12 RX ORDER — ALPRAZOLAM 0.5 MG/1
TABLET ORAL
Qty: 30 TABLET | Refills: 0 | Status: SHIPPED | OUTPATIENT
Start: 2022-04-12 | End: 2022-05-16 | Stop reason: SDUPTHER

## 2022-04-12 NOTE — TELEPHONE ENCOUNTER
Patient called to let the PCP know that the pharmacy filled the   metoprolol succinate (TOPROL XL) 50 MG extended release tablet 180 tablet 0 4/8/2022     Sig: TAKE 1 TABLET BY MOUTH TWICE DAILY      But she is also on   carvedilol (COREG) 6.25 MG tablet 180 tablet 0 2/24/2022     Sig: TAKE 1 TABLET BY MOUTH TWICE DAILY      She is concerned that they are the same type of medication and being taken at the same time.       Please advise          Please refill:      ALPRAZolam (XANAX) 0.5 MG tablet 30 tablet 0 3/11/2022 4/1/2022    Sig: TAKE 1 TABLET BY MOUTH EVERY NIGHT AS NEEDED FOR SLEEP OR ANXIETY      Pharmacy    [facility] [address] Mona Webern [phone]   [address] [place] [address]   Phone:  [phone]  Fax:  [phone]

## 2022-05-16 DIAGNOSIS — F41.9 ANXIETY: ICD-10-CM

## 2022-05-16 DIAGNOSIS — G47.09 OTHER INSOMNIA: ICD-10-CM

## 2022-05-16 RX ORDER — ALPRAZOLAM 0.5 MG/1
TABLET ORAL
Qty: 30 TABLET | Refills: 0 | Status: SHIPPED | OUTPATIENT
Start: 2022-05-16 | End: 2022-06-20 | Stop reason: SDUPTHER

## 2022-05-16 NOTE — TELEPHONE ENCOUNTER
Medication:   Requested Prescriptions     Pending Prescriptions Disp Refills    ALPRAZolam (XANAX) 0.5 MG tablet 30 tablet 0     Sig: TAKE 1 TABLET BY MOUTH EVERY NIGHT AS NEEDED FOR SLEEP OR ANXIETY      Last Filled:     Patient Phone Number: [phone] (home)     Last appt: 3/24/2022   Next appt: 6/23/2022    Last OARRS:   RX Monitoring 4/24/2019   Attestation The Prescription Monitoring Report for this patient was reviewed today. Periodic Controlled Substance Monitoring -   Chronic Pain > 50 MEDD Obtained or confirmened \"Consent of Opioid Use\" on file.      PDMP Monitoring:    Last PDMP Sandy Gilmore as Reviewed [facility]):  Review User Review Instant Review Result   Leilani Stapleton 3/24/2022  8:00 AM Reviewed PDMP [1]     Preferred Pharmacy:   [address] [address]  Phone: [phone] Fax: [phone]

## 2022-05-16 NOTE — TELEPHONE ENCOUNTER
ALPRAZolam (XANAX) 0.5 MG tablet 30 tablet 0 4/12/2022 5/3/2022    Sig: TAKE 1 TABLET BY MOUTH EVERY NIGHT AS NEEDED FOR SLEEP OR ANXIETY    Sent to pharmacy as: ALPRAZolam 0.5 MG Oral Tablet [facility]      Pharmacy    [address] [phone] Corinna Decquintin [phone]   [address] [place] [address]   Phone:  [phone]  Fax:  [phone]

## 2022-06-08 RX ORDER — RAMIPRIL 10 MG/1
CAPSULE ORAL
Qty: 90 CAPSULE | Refills: 0 | Status: SHIPPED | OUTPATIENT
Start: 2022-06-08 | End: 2022-09-08

## 2022-06-09 DIAGNOSIS — I10 PRIMARY HYPERTENSION: ICD-10-CM

## 2022-06-09 DIAGNOSIS — M13.0 POLYARTHROPATHY, MULTIPLE SITES: ICD-10-CM

## 2022-06-10 ENCOUNTER — TELEPHONE (OUTPATIENT)
Dept: OTHER | Facility: CLINIC | Age: 67
End: 2022-06-10

## 2022-06-10 NOTE — TELEPHONE ENCOUNTER
Medication:   Requested Prescriptions     Pending Prescriptions Disp Refills    carvedilol (COREG) 6.25 MG tablet [Pharmacy Med Name: CARVEDILOL 6.25MG TABLETS] 180 tablet 0     Sig: TAKE 1 TABLET BY MOUTH TWICE DAILY    furosemide (LASIX) 80 MG tablet [Pharmacy Med Name: FUROSEMIDE 80MG TABLETS] 90 tablet 1     Sig: TAKE 1 TABLET BY MOUTH EVERY DAY    gabapentin (NEURONTIN) 300 MG capsule [Pharmacy Med Name: GABAPENTIN 300MG CAPSULES] 180 capsule 1     Sig: TAKE 1 CAPSULE BY MOUTH THREE TIMES DAILY      Last Filled:      Patient Phone Number: [phone] (home)     Last appt: 3/24/2022   Next appt: 6/23/2022    Last OARRS:   RX Monitoring 4/24/2019   Attestation The Prescription Monitoring Report for this patient was reviewed today. Periodic Controlled Substance Monitoring -   Chronic Pain > 50 MEDD Obtained or confirmened \"Consent of Opioid Use\" on file.      PDMP Monitoring:    Last PDMP Juancarlos Cartwright as Reviewed [facility]):  Review User Review Instant Review Result   Nargis Luna 3/24/2022  8:00 AM Reviewed PDMP [1]     Preferred Pharmacy:   [address] [address]  Phone: [phone] Fax: [phone]

## 2022-06-10 NOTE — TELEPHONE ENCOUNTER
Esther Castañeda, Was contacted today as part of [facility] to schedule a mammogram.         Left VM for pt to return call to this nurse regarding routine health screenings. [facility] message also sent.          [facility], LPN

## 2022-06-11 RX ORDER — FUROSEMIDE 80 MG
TABLET ORAL
Qty: 90 TABLET | Refills: 0 | Status: SHIPPED | OUTPATIENT
Start: 2022-06-11 | End: 2022-09-08

## 2022-06-11 RX ORDER — CARVEDILOL 6.25 MG/1
TABLET ORAL
Qty: 180 TABLET | Refills: 0 | Status: SHIPPED | OUTPATIENT
Start: 2022-06-11 | End: 2022-09-08

## 2022-06-11 RX ORDER — GABAPENTIN 300 MG/1
CAPSULE ORAL
Qty: 180 CAPSULE | Refills: 0 | Status: SHIPPED | OUTPATIENT
Start: 2022-06-11 | End: 2022-08-09

## 2022-06-20 DIAGNOSIS — F41.9 ANXIETY: ICD-10-CM

## 2022-06-20 DIAGNOSIS — G47.09 OTHER INSOMNIA: ICD-10-CM

## 2022-06-20 RX ORDER — ALPRAZOLAM 0.5 MG/1
TABLET ORAL
Qty: 30 TABLET | Refills: 0 | Status: SHIPPED | OUTPATIENT
Start: 2022-06-20 | End: 2022-06-23 | Stop reason: SDUPTHER

## 2022-06-20 NOTE — TELEPHONE ENCOUNTER
ALPRAZolam (XANAX) 0.5 MG tablet [[id number]]  ENDED    Order Details  Dose, Route, Frequency: As Directed   Dispense Quantity: 30 tablet Refills: 0          Sig: TAKE 1 TABLET BY MOUTH EVERY NIGHT AS NEEDED FOR SLEEP OR ANXIETY           Pharmacy    [address] [phone] Kathy Mustapha [phone]   [address] [place], [place] [address]   Phone:  [phone]  Fax:  [phone]

## 2022-06-20 NOTE — TELEPHONE ENCOUNTER
Medication:   Requested Prescriptions     Pending Prescriptions Disp Refills    ALPRAZolam (XANAX) 0.5 MG tablet 30 tablet 0     Sig: TAKE 1 TABLET BY MOUTH EVERY NIGHT AS NEEDED FOR SLEEP OR ANXIETY      Last Filled:  05/16/2022    Patient Phone Number: [phone] (home)     Last appt: 3/24/2022   Next appt: 6/23/2022    Last OARRS:   RX Monitoring 4/24/2019   Attestation The Prescription Monitoring Report for this patient was reviewed today. Periodic Controlled Substance Monitoring -   Chronic Pain > 50 MEDD Obtained or confirmened \"Consent of Opioid Use\" on file.      PDMP Monitoring:    Last PDMP Josh Menendez as Reviewed [facility]):  Review User Review Instant Review Result   Sherly Londono 3/24/2022  8:00 AM Reviewed PDMP [1]     Preferred Pharmacy:   [address]  [place] [address]  Phone: [phone] Fax: [phone]

## 2022-06-23 ENCOUNTER — OFFICE VISIT (OUTPATIENT)
Dept: FAMILY MEDICINE CLINIC | Age: 67
End: 2022-06-23
Payer: MEDICARE

## 2022-06-23 VITALS
BODY MASS INDEX: 24.75 KG/M2 | SYSTOLIC BLOOD PRESSURE: 128 MMHG | WEIGHT: 158 LBS | DIASTOLIC BLOOD PRESSURE: 72 MMHG | HEART RATE: 86 BPM | TEMPERATURE: 97.3 F | OXYGEN SATURATION: 99 %

## 2022-06-23 DIAGNOSIS — F41.9 ANXIETY: ICD-10-CM

## 2022-06-23 DIAGNOSIS — G47.09 OTHER INSOMNIA: ICD-10-CM

## 2022-06-23 PROCEDURE — 1123F ACP DISCUSS/DSCN MKR DOCD: CPT | Performed by: NURSE PRACTITIONER

## 2022-06-23 PROCEDURE — 99213 OFFICE O/P EST LOW 20 MIN: CPT | Performed by: NURSE PRACTITIONER

## 2022-06-23 RX ORDER — ALPRAZOLAM 0.5 MG/1
TABLET ORAL
Qty: 30 TABLET | Refills: 0 | Status: SHIPPED | OUTPATIENT
Start: 2022-06-23 | End: 2022-07-18 | Stop reason: SDUPTHER

## 2022-06-23 ASSESSMENT — PATIENT HEALTH QUESTIONNAIRE - PHQ9
SUM OF ALL RESPONSES TO PHQ QUESTIONS 1-9: 2
SUM OF ALL RESPONSES TO PHQ9 QUESTIONS 1 & 2: 2
2. FEELING DOWN, DEPRESSED OR HOPELESS: 1
SUM OF ALL RESPONSES TO PHQ QUESTIONS 1-9: 2
1. LITTLE INTEREST OR PLEASURE IN DOING THINGS: 1
SUM OF ALL RESPONSES TO PHQ QUESTIONS 1-9: 2
SUM OF ALL RESPONSES TO PHQ QUESTIONS 1-9: 2

## 2022-06-23 NOTE — PROGRESS NOTES
Aide Andino  : 1955  Encounter date: 2022    This dominick 78 y.o. female who presents with  Chief Complaint   Patient presents with    Hypertension    Medication Check       History of present illness:    HPI   Pt is 78year old female for medication recheck on xanax for anxiety and insomnia, well controlled. No new concerns. BP well controlled with med adjustment. Current Outpatient Medications on File Prior to Visit   Medication Sig Dispense Refill    carvedilol (COREG) 6.25 MG tablet TAKE 1 TABLET BY MOUTH TWICE DAILY 180 tablet 0    furosemide (LASIX) 80 MG tablet TAKE 1 TABLET BY MOUTH EVERY DAY 90 tablet 0    gabapentin (NEURONTIN) 300 MG capsule TAKE 1 CAPSULE BY MOUTH THREE TIMES DAILY 180 capsule 0    ramipril (ALTACE) 10 MG capsule TAKE 1 CAPSULE BY MOUTH EVERY DAY 90 capsule 0    pravastatin (PRAVACHOL) 20 MG tablet TAKE 1 TABLET BY MOUTH EVERY EVENING 90 tablet 0    ibuprofen (ADVIL;MOTRIN) 200 MG tablet Take 200 mg by mouth every 6 hours as needed for Pain       VITAMIN D PO Take by mouth      acetaminophen (TYLENOL) 500 MG tablet Take 1,000 mg by mouth as needed       Multiple Vitamins-Minerals (THERAPEUTIC MULTIVITAMIN-MINERALS) tablet Take 1 tablet by mouth daily Centrum       No current facility-administered medications on file prior to visit.       Allergies   Allergen Reactions    Darvocet [Propoxyphene N-Acetaminophen] Hives    Lipitor [Atorvastatin]      Leg cramping    Diclofenac Rash     Past Medical History:   Diagnosis Date    Anxiety     Fibromyalgia     Gout     Hyperlipidemia     Hypertension     Osteoarthritis       Past Surgical History:   Procedure Laterality Date    APPENDECTOMY  2019    [facility] [place]. INJECTION PROCEDURE FOR SACROILIAC JOINT Right 2020    RIGHT SACROILIAC JOINT INJECTION AND RIGHT GREATER TROCHANTERIC BURSA INJECTION WITH FLUOROSCOPY ([id number], 05605) performed by Leon Soulier, MD at [place] [address] ([address] [place] [address]  INNER EAR SURGERY      many    KNEE ARTHROSCOPY Right 2009    KNEE ARTHROSCOPY Left     PAIN MANAGEMENT PROCEDURE Right 02/12/2020    RIGHT L4 AND L5 TRANSFORAMINAL EPIDURAL STEROID INJECTION WITH FLUOROSCOPY (63846, 13138) performed by Tom Man MD at [facility] Right 12/02/2020    RIGHT L4 AND L5 TRANSFORAMINAL EPIDURAL STEROID INJECTION WITH FLUOROSCOPY performed by Tom Man MD at [facility] Right 8/31/2021    RIGHT ROBOTIC ASSISTED TOTAL KNEE ARTHROPLASTY [facility] AND NEPHEW ADVANCED (68474, 30782) performed by Colette Estrada MD at [facility] [place]      Family History   Problem Relation Age of Onset    Diabetes Mother       Social History     Tobacco Use    Smoking status: Current Every Day Smoker     Packs/day: 1.50     Years: 40.00     Pack years: 60.00     Types: Cigarettes     Start date: 2/1/1990    Smokeless tobacco: Never Used   Substance Use Topics    Alcohol use: No        Review of Systems    Objective:    /72 (Site: Right Upper Arm, Position: Sitting, Cuff Size: Medium Adult)   Pulse 86   Temp 97.3 °F (36.3 °C) (Infrared)   Wt 158 lb (71.7 kg)   SpO2 99%   BMI 24.75 kg/m²   Weight: 158 lb (71.7 kg)     BP Readings from Last 3 Encounters:   06/23/22 128/72   03/24/22 138/84   02/24/22 (!) 142/86     Wt Readings from Last 3 Encounters:   06/23/22 158 lb (71.7 kg)   03/24/22 155 lb 2 oz (70.4 kg)   02/24/22 156 lb 12.8 oz (71.1 kg)     BMI Readings from Last 3 Encounters:   06/23/22 24.75 kg/m²   03/24/22 24.30 kg/m²   02/24/22 24.56 kg/m²       Physical Exam  Vitals reviewed. Constitutional:       Appearance: Normal appearance. She is well-developed. Cardiovascular:      Rate and Rhythm: Normal rate and regular rhythm. Pulses: Normal pulses. Heart sounds: Normal heart sounds. No murmur heard. Pulmonary:      Effort: Pulmonary effort is normal.      Breath sounds: Normal breath sounds.    Skin: General: Skin is warm and dry. Neurological:      Mental Status: She is alert and oriented to person, place, and time. Psychiatric:         Mood and Affect: Mood normal.         Behavior: Behavior normal.         Thought Content: Thought content normal.         Judgment: Judgment normal.         Assessment/Plan    1. Anxiety  Controlled  OARRS reviewed  - ALPRAZolam (XANAX) 0.5 MG tablet; TAKE 1 TABLET BY MOUTH EVERY NIGHT AS NEEDED FOR SLEEP OR ANXIETY  Dispense: 30 tablet; Refill: 0    2. Other insomnia  controlled  - ALPRAZolam (XANAX) 0.5 MG tablet; TAKE 1 TABLET BY MOUTH EVERY NIGHT AS NEEDED FOR SLEEP OR ANXIETY  Dispense: 30 tablet; Refill: 0      Return in about 3 months (around 9/23/2022) for medication re-check. This dictation was generated by voice recognition computer software. Although all attempts are made to edit the dictation for accuracy, there may be errors in the transcription that are not intended.

## 2022-07-02 DIAGNOSIS — I10 HYPERTENSION, UNSPECIFIED TYPE: ICD-10-CM

## 2022-07-05 RX ORDER — PRAVASTATIN SODIUM 20 MG
20 TABLET ORAL EVERY EVENING
Qty: 90 TABLET | Refills: 0 | Status: SHIPPED | OUTPATIENT
Start: 2022-07-05 | End: 2022-09-30

## 2022-07-18 DIAGNOSIS — G47.09 OTHER INSOMNIA: ICD-10-CM

## 2022-07-18 DIAGNOSIS — F41.9 ANXIETY: ICD-10-CM

## 2022-07-18 RX ORDER — ALPRAZOLAM 0.5 MG/1
TABLET ORAL
Qty: 30 TABLET | Refills: 0 | Status: SHIPPED | OUTPATIENT
Start: 2022-07-18 | End: 2022-08-16 | Stop reason: SDUPTHER

## 2022-07-18 NOTE — TELEPHONE ENCOUNTER
Medication:   Requested Prescriptions     Pending Prescriptions Disp Refills    ALPRAZolam (XANAX) 0.5 MG tablet 30 tablet 0     Sig: TAKE 1 TABLET BY MOUTH EVERY NIGHT AS NEEDED FOR SLEEP OR ANXIETY      Last Filled:  6/23/22    Patient Phone Number: [phone] (home)     Last appt: 6/23/2022   Next appt: 9/26/2022    Last OARRS:   RX Monitoring 4/24/2019   Attestation The Prescription Monitoring Report for this patient was reviewed today. Periodic Controlled Substance Monitoring -   Chronic Pain > 50 MEDD Obtained or confirmened \"Consent of Opioid Use\" on file.      PDMP Monitoring:    Last PDMP America Kirkpatrick as Reviewed [facility]):  Review User Review Instant Review Result   Christofer Martínez 6/23/2022  7:50 AM Reviewed PDMP [1]     Preferred Pharmacy:   [facility]   [address]  Phone: [phone] Fax: [phone]

## 2022-07-18 NOTE — TELEPHONE ENCOUNTER
Patient calling for a medication refill. ALPRAZolam (XANAX) 0.5 MG tablet [[id number]]  ENDED    Order Details  Dose, Route, Frequency: As Directed   Dispense Quantity: 30 tablet Refills: 0          Sig: TAKE 1 TABLET BY MOUTH EVERY NIGHT AS NEEDED FOR SLEEP OR ANXIETY   [facility] pharmacy on [address]. [phone].     Last OV 6/23  Next OV 9/26

## 2022-08-09 DIAGNOSIS — M13.0 POLYARTHROPATHY, MULTIPLE SITES: ICD-10-CM

## 2022-08-09 RX ORDER — GABAPENTIN 300 MG/1
CAPSULE ORAL
Qty: 180 CAPSULE | Refills: 0 | Status: SHIPPED | OUTPATIENT
Start: 2022-08-09 | End: 2022-10-05

## 2022-08-16 DIAGNOSIS — F41.9 ANXIETY: ICD-10-CM

## 2022-08-16 DIAGNOSIS — G47.09 OTHER INSOMNIA: ICD-10-CM

## 2022-08-16 RX ORDER — ALPRAZOLAM 0.5 MG/1
TABLET ORAL
Qty: 30 TABLET | Refills: 0 | Status: SHIPPED | OUTPATIENT
Start: 2022-08-16 | End: 2022-09-19 | Stop reason: SDUPTHER

## 2022-08-16 NOTE — TELEPHONE ENCOUNTER
Medication:   Requested Prescriptions     Pending Prescriptions Disp Refills    ALPRAZolam (XANAX) 0.5 MG tablet 30 tablet 0     Sig: TAKE 1 TABLET BY MOUTH EVERY NIGHT AS NEEDED FOR SLEEP OR ANXIETY      Last Filled:  7/18/22    Patient Phone Number: [phone] (home)     Last appt: 6/23/2022   Next appt: 9/26/2022    Last OARRS:   RX Monitoring 4/24/2019   Attestation The Prescription Monitoring Report for this patient was reviewed today. Periodic Controlled Substance Monitoring -   Chronic Pain > 50 MEDD Obtained or confirmened \"Consent of Opioid Use\" on file.      PDMP Monitoring:    Last PDMP Elan Montes as Reviewed [facility]):  Review User Review Instant Review Result   Shortymaría Lombard 6/23/2022  7:50 AM Reviewed PDMP [1]     Preferred Pharmacy:   [address]  [address]  Phone: [phone] Fax: [phone]

## 2022-08-22 ENCOUNTER — OFFICE VISIT (OUTPATIENT)
Dept: ORTHOPEDIC SURGERY | Age: 67
End: 2022-08-22
Payer: MEDICARE

## 2022-08-22 VITALS — BODY MASS INDEX: 23.4 KG/M2 | WEIGHT: 158 LBS | HEIGHT: 69 IN

## 2022-08-22 DIAGNOSIS — M17.11 PRIMARY OSTEOARTHRITIS OF RIGHT KNEE: Primary | ICD-10-CM

## 2022-08-22 PROCEDURE — 1123F ACP DISCUSS/DSCN MKR DOCD: CPT | Performed by: PHYSICIAN ASSISTANT

## 2022-08-22 PROCEDURE — 99213 OFFICE O/P EST LOW 20 MIN: CPT | Performed by: PHYSICIAN ASSISTANT

## 2022-08-22 NOTE — PROGRESS NOTES
Patient Name: Jerry Dent  Medical Record Number: [id number]  YOB: 1955  Date of Encounter: 8/22/2022     Chief Complaint   Patient presents with    Follow-up     Right knee pain. History of Present Illness:   Ms. Jerry Dent is here in 1 year follow up regarding her right total knee arthroplasty on 8/31/2021 with Dr. Jnoa Gregory. Patient presents today stating her right knee was doing great up until about 10 days ago. She states she was walking down her basement stairs when she missed 1 step and nearly fell. She did not hit the knee. She states she did hyperflexed the knee. Her knee has been Soosalu little sore\" since that time. She does feel it is getting better. She denies swelling, redness or warmth of the right knee. She denies instability. She is ambulating without any assistive device    The patient's past medical history, medications, allergies, family history, social history, and review of systems have been reviewed, and dated and are recorded in the chart under the 'MEDIA\" tab. Physical Exam:    Ms. Jerry Dent appears well, she is in no apparent distress, she demonstrates appropriate mood & affect. She is alert and oriented to person, place and time. Ht 5' 9\" (1.753 m)   Wt 158 lb (71.7 kg)   BMI 23.33 kg/m²     On examination of patient's right knee there is no swelling or joint effusion. She does have mild tenderness on palpation of the right knee, including the medial joint line, lateral joint line, and even superior patella. Range of motion is 0 to 125 degrees. Strength is 5/5. There is no instability. Radiology:  X-rays obtained and reviewed in office:   Views: 3 view right knee including AP, lateral and sunrise views  Impression: Patient is status post right total knee arthroplasty. There is no evidence of loosening or hardware failure.   There are no acute fractures    Orders  Orders Placed This Encounter   Procedures    XR KNEE RIGHT (MIN 4 VIEWS) Standing Status:   Future     Number of Occurrences:   1     Standing Expiration Date:   8/22/2023         Impression:   Diagnosis Orders   1. 8/31/21 RIGHT TKA  XR KNEE RIGHT (MIN 4 VIEWS)          Treatment Plan:    Patient is doing well 1 year status post right total knee arthroplasty. She had a mild setback 10 days ago when she hyperflexed the right knee. X-rays today are unremarkable without any evidence of loosening or hardware failure. She has great range of motion and strength of the right knee. There is no instability. She is not using any assistive device. Patient is advised to continue resting, icing and elevating the right knee as needed. She will modify activities at this time. Patient is advised to follow back in 6 weeks or before that time with any concerns. We should not need repeat radiology at that time    Luna Burnham was informed of the results of any imaging. We discussed treatment options and a time was given to answer questions. A plan was proposed and Luna Burnham understand and accepts this course of care. Electronically signed by Nikki Martínez PA-C on 0/85/5538  Board Certified [facility]    Please note that portions of this dictation was performed with a voice recognition program (tribr). All efforts were made to edit the dictation but occasionally words are mis-transcribed. It is possible that there are still dictated errors within this office note.   If so, please bring any errors to my attention for an addendum

## 2022-09-08 DIAGNOSIS — I10 PRIMARY HYPERTENSION: ICD-10-CM

## 2022-09-08 RX ORDER — CARVEDILOL 6.25 MG/1
TABLET ORAL
Qty: 180 TABLET | Refills: 0 | Status: SHIPPED | OUTPATIENT
Start: 2022-09-08

## 2022-09-08 RX ORDER — FUROSEMIDE 80 MG
TABLET ORAL
Qty: 90 TABLET | Refills: 0 | Status: SHIPPED | OUTPATIENT
Start: 2022-09-08

## 2022-09-08 RX ORDER — RAMIPRIL 10 MG/1
CAPSULE ORAL
Qty: 90 CAPSULE | Refills: 0 | Status: SHIPPED | OUTPATIENT
Start: 2022-09-08

## 2022-09-19 ENCOUNTER — TELEPHONE (OUTPATIENT)
Dept: FAMILY MEDICINE CLINIC | Age: 67
End: 2022-09-19

## 2022-09-19 DIAGNOSIS — G47.09 OTHER INSOMNIA: ICD-10-CM

## 2022-09-19 DIAGNOSIS — F41.9 ANXIETY: ICD-10-CM

## 2022-09-19 RX ORDER — ALPRAZOLAM 0.5 MG/1
TABLET ORAL
Qty: 30 TABLET | Refills: 0 | Status: SHIPPED | OUTPATIENT
Start: 2022-09-19 | End: 2022-10-19 | Stop reason: SDUPTHER

## 2022-09-19 NOTE — TELEPHONE ENCOUNTER
Patient is calling in to see if she can use her appointment on the 26th for her per op for eye surgery or do she need to make a separate appointment. Please give the patient a call!

## 2022-09-19 NOTE — TELEPHONE ENCOUNTER
ALPRAZolam (XANAX) 0.5 MG tablet [[id number]]  ENDED    Order Details  Dose, Route, Frequency: As Directed   Dispense Quantity: 30 tablet Refills: 0          Sig: TAKE 1 TABLET BY MOUTH EVERY NIGHT AS NEEDED FOR SLEEP OR ANXIETY

## 2022-09-19 NOTE — TELEPHONE ENCOUNTER
Medication:   Requested Prescriptions     Pending Prescriptions Disp Refills    ALPRAZolam (XANAX) 0.5 MG tablet 30 tablet 0     Sig: TAKE 1 TABLET BY MOUTH EVERY NIGHT AS NEEDED FOR SLEEP OR ANXIETY      Last Filled:  8/16/2022    Patient Phone Number: [phone] (home)     Last appt: 6/23/2022   Next appt: 9/19/2022    Last OARRS:   RX Monitoring 4/24/2019   Attestation The Prescription Monitoring Report for this patient was reviewed today. Periodic Controlled Substance Monitoring -   Chronic Pain > 50 MEDD Obtained or confirmened \"Consent of Opioid Use\" on file.      PDMP Monitoring:    Last PDMP Zahra Devries as Reviewed [facility]):  Review User Review Instant Review Result   Shae Palmer 6/23/2022  7:50 AM Reviewed PDMP [1]     Preferred Pharmacy:   [facility]   [address]  Phone: [phone] Fax: [phone]

## 2022-09-21 ENCOUNTER — OFFICE VISIT (OUTPATIENT)
Dept: FAMILY MEDICINE CLINIC | Age: 67
End: 2022-09-21
Payer: MEDICARE

## 2022-09-21 VITALS
HEART RATE: 69 BPM | TEMPERATURE: 97.4 F | BODY MASS INDEX: 24.64 KG/M2 | HEIGHT: 67 IN | DIASTOLIC BLOOD PRESSURE: 84 MMHG | SYSTOLIC BLOOD PRESSURE: 140 MMHG | WEIGHT: 157 LBS

## 2022-09-21 DIAGNOSIS — H25.9 AGE-RELATED CATARACT OF BOTH EYES, UNSPECIFIED AGE-RELATED CATARACT TYPE: ICD-10-CM

## 2022-09-21 DIAGNOSIS — Z01.818 PREOP EXAMINATION: Primary | ICD-10-CM

## 2022-09-21 PROCEDURE — 99213 OFFICE O/P EST LOW 20 MIN: CPT | Performed by: FAMILY MEDICINE

## 2022-09-21 PROCEDURE — 1123F ACP DISCUSS/DSCN MKR DOCD: CPT | Performed by: FAMILY MEDICINE

## 2022-09-21 NOTE — PROGRESS NOTES
Preoperative Consultation    Chief Complaint   Patient presents with    Pre-op Exam       This patient presents to the office today for a preoperative consultation at the request of surgeon, Dr. Randell Cheung, who plans on performing left phacoemulsification with intraocular lens implant on September 28 at [facility]. Right phacoemulsification with lens on October 12. Planned anesthesia: Topical anesthesia and MAC   Known anesthesia problems: None, no family history of anesthesia problems.   Bleeding risk: No recent or remote history of abnormal bleeding  Personal or FH of DVT/PE: No        Patient Active Problem List   Diagnosis    Osteoarthritis    Back pain    Hypertension    Fibromyalgia    Hypoglycemia    Hearing problem of both ears    Macular degeneration    Cervical disc disease    Tobacco dependence    Other insomnia    Anxiety    Pain medication agreement    Chronic, continuous use of opioids    Polyarthropathy, multiple sites    Other spondylosis, lumbar region    Mixed hyperlipidemia    Bilateral primary osteoarthritis of knee       Past Medical History:   Diagnosis Date    Anxiety     Fibromyalgia     Gout     Hyperlipidemia     Hypertension     Osteoarthritis      Past Surgical History:   Procedure Laterality Date    APPENDECTOMY  02/13/2019    [facility]. INJECTION PROCEDURE FOR SACROILIAC JOINT Right 08/31/2020    RIGHT SACROILIAC JOINT INJECTION AND RIGHT GREATER TROCHANTERIC BURSA INJECTION WITH FLUOROSCOPY (48055, 59086) performed by Avery Livingston MD at [address] [place] [address]      with ovaries    INNER EAR SURGERY Bilateral     many    KNEE ARTHROSCOPY Right 2009    KNEE ARTHROSCOPY Left     PAIN MANAGEMENT PROCEDURE Right 02/12/2020    RIGHT L4 AND L5 TRANSFORAMINAL EPIDURAL STEROID INJECTION WITH FLUOROSCOPY (77634, 45958) performed by Avery Livingston MD at [address] [place] [address] Right 12/02/2020    RIGHT L4 AND L5 TRANSFORAMINAL EPIDURAL STEROID INJECTION WITH FLUOROSCOPY performed by Negro Silva MD at [facility] Right 08/31/2021    RIGHT ROBOTIC ASSISTED TOTAL KNEE ARTHROPLASTY [facility] AND NEPHEW ADVANCED (44176, 93902) performed by Lino Fuller MD at [facility]       Family History   Problem Relation Age of Onset    Diabetes Mother     Heart Disease Mother     Emphysema Father     Heart Failure Father     Other Sister         COVID    Early Death Sister         infant    No Known Problems Sister     No Known Problems Sister     No Known Problems Brother        Allergies   Allergen Reactions    Darvocet [Propoxyphene N-Acetaminophen] Hives    Lipitor [Atorvastatin]      Leg cramping    Diclofenac Rash     Outpatient Medications Marked as Taking for the 9/21/22 encounter (Office Visit) with Shanique Ingram MD   Medication Sig Dispense Refill    ALPRAZolam (XANAX) 0.5 MG tablet TAKE 1 TABLET BY MOUTH EVERY NIGHT AS NEEDED FOR SLEEP OR ANXIETY 30 tablet 0    furosemide (LASIX) 80 MG tablet TAKE 1 TABLET BY MOUTH EVERY DAY 90 tablet 0    ramipril (ALTACE) 10 MG capsule TAKE 1 CAPSULE BY MOUTH EVERY DAY 90 capsule 0    carvedilol (COREG) 6.25 MG tablet TAKE 1 TABLET BY MOUTH TWICE DAILY 180 tablet 0    pravastatin (PRAVACHOL) 20 MG tablet TAKE 1 TABLET BY MOUTH EVERY EVENING 90 tablet 0    ibuprofen (ADVIL;MOTRIN) 200 MG tablet Take 200 mg by mouth every 6 hours as needed for Pain       VITAMIN D PO Take by mouth      acetaminophen (TYLENOL) 500 MG tablet Take 1,000 mg by mouth as needed       Multiple Vitamins-Minerals (THERAPEUTIC MULTIVITAMIN-MINERALS) tablet Take 1 tablet by mouth daily Centrum         Social History     Tobacco Use    Smoking status: Every Day     Packs/day: 0.50     Years: 40.00     Pack years: 20.00     Types: Cigarettes     Start date: 2/1/1990    Smokeless tobacco: Never   Substance Use Topics    Alcohol use: No         REVIEW OF SYSTEMS:    Constitutional:  Feeling well, No fever, chills, no weight change, no fatigue  Eyes:  no vision loss/disturbance  Ears, nose, mouth, throat, and face:    No hearing change, no sore throat, no rhinorrhea, no nasal congestion  Respiratory:   no cough, no shortness of breath, no dyspnea on exertion,   Cardiovascular:   No chest pain, no palpitations, no irregular heart beat, no edema  Gastrointestinal:   No change in bowels, no pain, no nausea or vomiting, no blood or black tarry stool  Genitourinary:   No change in bladder, no nocturia  Hematologic/lymphatic:   No bleeding, no easy bruising  Musculoskeletal:    No joint pain  Behavioral/Psych:    No depression, no anxiety  Skin:    No rashes, no new lesions    PHYSICAL EXAM  Vitals:    09/21/22 0857 09/21/22 0924   BP: (!) 159/97 (!) 140/84   Pulse: 69    Temp: 97.4 °F (36.3 °C)    Weight: 157 lb (71.2 kg)    Height: 5' 7.25\" (1.708 m)      Body mass index is 24.41 kg/m². CONSTITUTIONAL: Alert and oriented x 4 NAD, affect appropriate and normal appearing weight, well hydrated, well developed. Eyes:  Lids and lashes normal, pupils equal, round and reactive to light, extra ocular muscles intact, sclera clear, conjunctiva normal    Head/ENT:  Normocephalic, without obvious abnormality, atramatic, external ears without lesions, Canals normal but superior to normal opening, TM clear bilaterally, oral pharynx with moist mucus membranes, tonsils without erythema or exudates, gums normal, upper and lower dentures.      Neck:  Supple, symmetrical, trachea midline, no adenopathy, thyroid symmetric, not enlarged and no tenderness, skin normal    Heart: Regular rate and rhythm, normal S1 and S2, and no murmur noted    Lungs:  No increased work of breathing, good air exchange, clear to auscultation bilaterally    Abdomen:  Normal bowel sounds, soft, non-distended, non-tender, no masses palpated, no hepatosplenomegally    Extremities:   no edema, nl pedal pulses bilaterally, + varicose veins    NEUROLOGIC:Cranial nerves II-XII are grossly intact. Motor is 5 out of 5 bilaterally.            Assessment:           ASSESSMENT AND PLAN:       Randa was seen today for pre-op exam.    Diagnoses and all orders for this visit:    Preop examination    Age-related cataract of both eyes, unspecified age-related cataract type    Medically stable for planned procedures

## 2022-09-26 ENCOUNTER — OFFICE VISIT (OUTPATIENT)
Dept: FAMILY MEDICINE CLINIC | Age: 67
End: 2022-09-26
Payer: MEDICARE

## 2022-09-26 VITALS
BODY MASS INDEX: 24.56 KG/M2 | WEIGHT: 158 LBS | SYSTOLIC BLOOD PRESSURE: 130 MMHG | OXYGEN SATURATION: 93 % | DIASTOLIC BLOOD PRESSURE: 76 MMHG | HEART RATE: 79 BPM | TEMPERATURE: 97.3 F

## 2022-09-26 DIAGNOSIS — F41.9 ANXIETY: Primary | ICD-10-CM

## 2022-09-26 PROCEDURE — 1123F ACP DISCUSS/DSCN MKR DOCD: CPT | Performed by: NURSE PRACTITIONER

## 2022-09-26 PROCEDURE — 99213 OFFICE O/P EST LOW 20 MIN: CPT | Performed by: NURSE PRACTITIONER

## 2022-09-26 SDOH — ECONOMIC STABILITY: FOOD INSECURITY: WITHIN THE PAST 12 MONTHS, YOU WORRIED THAT YOUR FOOD WOULD RUN OUT BEFORE YOU GOT MONEY TO BUY MORE.: NEVER TRUE

## 2022-09-26 SDOH — ECONOMIC STABILITY: FOOD INSECURITY: WITHIN THE PAST 12 MONTHS, THE FOOD YOU BOUGHT JUST DIDN'T LAST AND YOU DIDN'T HAVE MONEY TO GET MORE.: NEVER TRUE

## 2022-09-26 ASSESSMENT — PATIENT HEALTH QUESTIONNAIRE - PHQ9
SUM OF ALL RESPONSES TO PHQ9 QUESTIONS 1 & 2: 0
SUM OF ALL RESPONSES TO PHQ QUESTIONS 1-9: 0
1. LITTLE INTEREST OR PLEASURE IN DOING THINGS: 0
2. FEELING DOWN, DEPRESSED OR HOPELESS: 0
SUM OF ALL RESPONSES TO PHQ QUESTIONS 1-9: 0

## 2022-09-26 ASSESSMENT — SOCIAL DETERMINANTS OF HEALTH (SDOH): HOW HARD IS IT FOR YOU TO PAY FOR THE VERY BASICS LIKE FOOD, HOUSING, MEDICAL CARE, AND HEATING?: NOT HARD AT ALL

## 2022-09-26 NOTE — PROGRESS NOTES
Robert Williamson  : 1955  Encounter date: 2022    This dominick 75 y.o. female who presents with  Chief Complaint   Patient presents with    Medication Check       History of present illness:    HPI pt is 75year old female for medication check on xanax for anxiety/insomnia. No new concerns. Upcoming appt for cataract repair. Current Outpatient Medications on File Prior to Visit   Medication Sig Dispense Refill    ALPRAZolam (XANAX) 0.5 MG tablet TAKE 1 TABLET BY MOUTH EVERY NIGHT AS NEEDED FOR SLEEP OR ANXIETY 30 tablet 0    furosemide (LASIX) 80 MG tablet TAKE 1 TABLET BY MOUTH EVERY DAY 90 tablet 0    ramipril (ALTACE) 10 MG capsule TAKE 1 CAPSULE BY MOUTH EVERY DAY 90 capsule 0    carvedilol (COREG) 6.25 MG tablet TAKE 1 TABLET BY MOUTH TWICE DAILY 180 tablet 0    gabapentin (NEURONTIN) 300 MG capsule TAKE 1 CAPSULE BY MOUTH THREE TIMES DAILY 180 capsule 0    pravastatin (PRAVACHOL) 20 MG tablet TAKE 1 TABLET BY MOUTH EVERY EVENING 90 tablet 0    ibuprofen (ADVIL;MOTRIN) 200 MG tablet Take 200 mg by mouth every 6 hours as needed for Pain       VITAMIN D PO Take by mouth      acetaminophen (TYLENOL) 500 MG tablet Take 1,000 mg by mouth as needed       Multiple Vitamins-Minerals (THERAPEUTIC MULTIVITAMIN-MINERALS) tablet Take 1 tablet by mouth daily Centrum       No current facility-administered medications on file prior to visit.       Allergies   Allergen Reactions    Darvocet [Propoxyphene N-Acetaminophen] Hives    Lipitor [Atorvastatin]      Leg cramping    Diclofenac Rash     Past Medical History:   Diagnosis Date    Anxiety     Fibromyalgia     Gout     Hyperlipidemia     Hypertension     Osteoarthritis       Past Surgical History:   Procedure Laterality Date    APPENDECTOMY  2019    [facility] [place]. INJECTION PROCEDURE FOR SACROILIAC JOINT Right 2020    RIGHT SACROILIAC JOINT INJECTION AND RIGHT GREATER TROCHANTERIC BURSA INJECTION WITH FLUOROSCOPY (75398, 43790) performed by Dior Chambers MD at [facility]    HYSTERECTOMY, VAGINAL      with ovaries    INNER EAR SURGERY Bilateral     many    KNEE ARTHROSCOPY Right 2009    KNEE ARTHROSCOPY Left     PAIN MANAGEMENT PROCEDURE Right 02/12/2020    RIGHT L4 AND L5 TRANSFORAMINAL EPIDURAL STEROID INJECTION WITH FLUOROSCOPY (40885, 11414) performed by Georgiana Longoria MD at [facility] Right 12/02/2020    RIGHT L4 AND L5 TRANSFORAMINAL EPIDURAL STEROID INJECTION WITH FLUOROSCOPY performed by Georgiana Longoria MD at [facility] Right 08/31/2021    RIGHT ROBOTIC ASSISTED TOTAL KNEE ARTHROPLASTY - PICHARDO AND NEPHEW ADVANCED ([id number], 29521) performed by Fitz Watters MD at [facility]        Family History   Problem Relation Age of Onset    Diabetes Mother     Heart Disease Mother     Emphysema Father     Heart Failure Father     Other Sister         COVID    Early Death Sister         infant    No Known Problems Sister     No Known Problems Sister     No Known Problems Brother       Social History     Tobacco Use    Smoking status: Every Day     Packs/day: 0.50     Years: 40.00     Pack years: 20.00     Types: Cigarettes     Start date: 2/1/1990    Smokeless tobacco: Never   Substance Use Topics    Alcohol use: No        Review of Systems    Objective:    /76 (Site: Right Upper Arm, Position: Sitting, Cuff Size: Medium Adult)   Pulse 79   Temp 97.3 °F (36.3 °C) (Infrared)   Wt 158 lb (71.7 kg)   SpO2 93%   BMI 24.56 kg/m²   Weight: 158 lb (71.7 kg)     BP Readings from Last 3 Encounters:   09/26/22 130/76   09/21/22 (!) 140/84   06/23/22 128/72     Wt Readings from Last 3 Encounters:   09/26/22 158 lb (71.7 kg)   09/21/22 157 lb (71.2 kg)   08/22/22 158 lb (71.7 kg)     BMI Readings from Last 3 Encounters:   09/26/22 24.56 kg/m²   09/21/22 24.41 kg/m²   08/22/22 23.33 kg/m²       Physical Exam  Vitals reviewed. Constitutional:       Appearance: Normal appearance. She is well-developed. Cardiovascular:      Rate and Rhythm: Normal rate and regular rhythm. Pulses: Normal pulses. Heart sounds: Normal heart sounds. No murmur heard. Pulmonary:      Effort: Pulmonary effort is normal.      Breath sounds: Normal breath sounds. Skin:     General: Skin is warm and dry. Neurological:      Mental Status: She is alert and oriented to person, place, and time. Psychiatric:         Mood and Affect: Mood normal.       Assessment/Plan    1. Anxiety  OARRS reviewed  controlled    Return in about 3 months (around 12/26/2022) for annual check up. This dictation was generated by voice recognition computer software. Although all attempts are made to edit the dictation for accuracy, there may be errors in the transcription that are not intended.

## 2022-09-30 DIAGNOSIS — I10 HYPERTENSION, UNSPECIFIED TYPE: ICD-10-CM

## 2022-09-30 RX ORDER — PRAVASTATIN SODIUM 20 MG
20 TABLET ORAL EVERY EVENING
Qty: 90 TABLET | Refills: 0 | Status: SHIPPED | OUTPATIENT
Start: 2022-09-30

## 2022-10-04 DIAGNOSIS — M13.0 POLYARTHROPATHY, MULTIPLE SITES: ICD-10-CM

## 2022-10-05 RX ORDER — GABAPENTIN 300 MG/1
CAPSULE ORAL
Qty: 270 CAPSULE | Refills: 0 | Status: SHIPPED | OUTPATIENT
Start: 2022-10-05 | End: 2022-11-04

## 2022-10-18 DIAGNOSIS — G47.09 OTHER INSOMNIA: ICD-10-CM

## 2022-10-18 DIAGNOSIS — F41.9 ANXIETY: ICD-10-CM

## 2022-10-18 NOTE — TELEPHONE ENCOUNTER
Medication:   Requested Prescriptions     Pending Prescriptions Disp Refills    ALPRAZolam (XANAX) 0.5 MG tablet 30 tablet 0     Sig: TAKE 1 TABLET BY MOUTH EVERY NIGHT AS NEEDED FOR SLEEP OR ANXIETY      Last Filled:  9/19/2022    Patient Phone Number: [phone] (home)     Last appt: 9/26/2022   Next appt: 12/27/2022    Last OARRS:   RX Monitoring 4/24/2019   Attestation The Prescription Monitoring Report for this patient was reviewed today. Periodic Controlled Substance Monitoring -   Chronic Pain > 50 MEDD Obtained or confirmened \"Consent of Opioid Use\" on file.      PDMP Monitoring:    Last PDMP Pawel Duncan as Reviewed [facility]):  Review User Review Instant Review Result   Ava Saliva 9/26/2022  8:07 AM Reviewed PDMP [1]     Preferred Pharmacy:   [address] [place]  [address]  Phone: [phone] Fax: [phone]

## 2022-10-18 NOTE — TELEPHONE ENCOUNTER
ALPRAZolam Elie Starr 0.5 MG tablet      Pharmacy    Corine Paez [address], [place] [address]   Phone:  [phone]  Fax:  [phone]

## 2022-10-19 RX ORDER — ALPRAZOLAM 0.5 MG/1
TABLET ORAL
Qty: 30 TABLET | Refills: 0 | Status: SHIPPED | OUTPATIENT
Start: 2022-10-19 | End: 2022-11-08

## 2022-11-21 ENCOUNTER — TELEPHONE (OUTPATIENT)
Dept: FAMILY MEDICINE CLINIC | Age: 67
End: 2022-11-21

## 2022-11-21 DIAGNOSIS — F41.9 ANXIETY: ICD-10-CM

## 2022-11-21 DIAGNOSIS — G47.09 OTHER INSOMNIA: ICD-10-CM

## 2022-11-21 RX ORDER — ALPRAZOLAM 0.5 MG/1
TABLET ORAL
Qty: 30 TABLET | Refills: 0 | Status: SHIPPED | OUTPATIENT
Start: 2022-11-21 | End: 2022-12-11

## 2022-11-21 NOTE — TELEPHONE ENCOUNTER
ALPRAZolam (XANAX) 0.5 MG tablet [[id number]]  ENDED    Order Details  Dose, Route, Frequency: As Directed   Dispense Quantity: 30 tablet Refills: 0          Sig: TAKE 1 TABLET BY MOUTH EVERY NIGHT AS NEEDED FOR SLEEP OR ANXIETY   [facility] [address] [phone]   [address] [place], [place] [phone]   Phone:  [phone]  Fax:  [phone]

## 2022-11-21 NOTE — TELEPHONE ENCOUNTER
Medication:   Requested Prescriptions     Pending Prescriptions Disp Refills    ALPRAZolam (XANAX) 0.5 MG tablet 30 tablet 0     Sig: TAKE 1 TABLET BY MOUTH EVERY NIGHT AS NEEDED FOR SLEEP OR ANXIETY      Last Filled:  10/19/2022    Patient Phone Number: [phone] (home)     Last appt: 9/26/2022   Next appt: 12/27/2022    Last OARRS:   RX Monitoring 4/24/2019   Attestation The Prescription Monitoring Report for this patient was reviewed today. Periodic Controlled Substance Monitoring -   Chronic Pain > 50 MEDD Obtained or confirmened \"Consent of Opioid Use\" on file.      PDMP Monitoring:    Last PDMP Yanique Avilez as Reviewed [facility]):  Review User Review Instant Review Result   Jocelyn Nunez 10/19/2022  4:38 AM Reviewed PDMP [1]     Preferred Pharmacy:   [address] [place] [address]  [address]  Phone: [phone] Fax: [phone]

## 2022-12-03 DIAGNOSIS — I10 PRIMARY HYPERTENSION: ICD-10-CM

## 2022-12-05 RX ORDER — FUROSEMIDE 80 MG
TABLET ORAL
Qty: 90 TABLET | Refills: 0 | Status: SHIPPED | OUTPATIENT
Start: 2022-12-05

## 2022-12-05 RX ORDER — RAMIPRIL 10 MG/1
CAPSULE ORAL
Qty: 90 CAPSULE | Refills: 0 | Status: SHIPPED | OUTPATIENT
Start: 2022-12-05

## 2022-12-05 RX ORDER — CARVEDILOL 6.25 MG/1
TABLET ORAL
Qty: 180 TABLET | Refills: 0 | Status: SHIPPED | OUTPATIENT
Start: 2022-12-05

## 2022-12-19 ENCOUNTER — TELEPHONE (OUTPATIENT)
Dept: FAMILY MEDICINE CLINIC | Age: 67
End: 2022-12-19

## 2022-12-19 DIAGNOSIS — F41.9 ANXIETY: ICD-10-CM

## 2022-12-19 DIAGNOSIS — G47.09 OTHER INSOMNIA: ICD-10-CM

## 2022-12-19 NOTE — TELEPHONE ENCOUNTER
Medication:   Requested Prescriptions     Pending Prescriptions Disp Refills    ALPRAZolam (XANAX) 0.5 MG tablet 30 tablet 0     Sig: TAKE 1 TABLET BY MOUTH EVERY NIGHT AS NEEDED FOR SLEEP OR ANXIETY      Last Filled:  11/21/2022    Patient Phone Number: [phone] (home)     Last appt: 9/26/2022   Next appt: 12/27/2022    Last OARRS:   RX Monitoring 4/24/2019   Attestation The Prescription Monitoring Report for this patient was reviewed today. Periodic Controlled Substance Monitoring -   Chronic Pain > 50 MEDD Obtained or confirmened \"Consent of Opioid Use\" on file.      PDMP Monitoring:    Last PDMP Marlon Pate as Reviewed [facility]):  Review User Review Instant Review Result   Leela Ortiz 10/19/2022  4:38 AM Reviewed PDMP [1]     Preferred Pharmacy:   [place] [address]  [address]  Phone: [phone] Fax: [phone]

## 2022-12-19 NOTE — TELEPHONE ENCOUNTER
ALPRAZolam (XANAX) 0.5 MG tablet [[id number]]  ENDED    Order Details  Dose, Route, Frequency: As Directed   Dispense Quantity: 30 tablet Refills: 0          Sig: TAKE 1 TABLET BY MOUTH EVERY NIGHT AS NEEDED FOR SLEEP OR ANXIETY     Pharmacy    [facility] [address] [phone] [facility] [phone]   [address] [place], [place] [address]   Phone:  [phone]  Fax:  [phone]

## 2022-12-20 RX ORDER — ALPRAZOLAM 0.5 MG/1
TABLET ORAL
Qty: 30 TABLET | Refills: 0 | Status: SHIPPED | OUTPATIENT
Start: 2022-12-20 | End: 2023-01-08

## 2022-12-20 SDOH — HEALTH STABILITY: PHYSICAL HEALTH: ON AVERAGE, HOW MANY MINUTES DO YOU ENGAGE IN EXERCISE AT THIS LEVEL?: 60 MIN

## 2022-12-20 SDOH — HEALTH STABILITY: PHYSICAL HEALTH: ON AVERAGE, HOW MANY DAYS PER WEEK DO YOU ENGAGE IN MODERATE TO STRENUOUS EXERCISE (LIKE A BRISK WALK)?: 7 DAYS

## 2022-12-20 ASSESSMENT — LIFESTYLE VARIABLES
HOW OFTEN DO YOU HAVE A DRINK CONTAINING ALCOHOL: NEVER
HOW MANY STANDARD DRINKS CONTAINING ALCOHOL DO YOU HAVE ON A TYPICAL DAY: 0
HOW OFTEN DO YOU HAVE SIX OR MORE DRINKS ON ONE OCCASION: 1
HOW OFTEN DO YOU HAVE A DRINK CONTAINING ALCOHOL: 1
HOW MANY STANDARD DRINKS CONTAINING ALCOHOL DO YOU HAVE ON A TYPICAL DAY: PATIENT DOES NOT DRINK

## 2022-12-20 ASSESSMENT — PATIENT HEALTH QUESTIONNAIRE - PHQ9
SUM OF ALL RESPONSES TO PHQ QUESTIONS 1-9: 0
2. FEELING DOWN, DEPRESSED OR HOPELESS: 0
SUM OF ALL RESPONSES TO PHQ QUESTIONS 1-9: 0
1. LITTLE INTEREST OR PLEASURE IN DOING THINGS: 0
SUM OF ALL RESPONSES TO PHQ QUESTIONS 1-9: 0
SUM OF ALL RESPONSES TO PHQ9 QUESTIONS 1 & 2: 0
SUM OF ALL RESPONSES TO PHQ QUESTIONS 1-9: 0

## 2022-12-27 ENCOUNTER — OFFICE VISIT (OUTPATIENT)
Dept: FAMILY MEDICINE CLINIC | Age: 67
End: 2022-12-27
Payer: MEDICARE

## 2022-12-27 VITALS
HEART RATE: 71 BPM | OXYGEN SATURATION: 96 % | SYSTOLIC BLOOD PRESSURE: 134 MMHG | WEIGHT: 159 LBS | DIASTOLIC BLOOD PRESSURE: 82 MMHG | BODY MASS INDEX: 24.1 KG/M2 | TEMPERATURE: 97.8 F | HEIGHT: 68 IN

## 2022-12-27 DIAGNOSIS — Z12.31 ENCOUNTER FOR SCREENING MAMMOGRAM FOR MALIGNANT NEOPLASM OF BREAST: ICD-10-CM

## 2022-12-27 DIAGNOSIS — Z00.00 ENCOUNTER FOR ANNUAL WELLNESS VISIT (AWV) IN MEDICARE PATIENT: Primary | ICD-10-CM

## 2022-12-27 DIAGNOSIS — I10 PRIMARY HYPERTENSION: ICD-10-CM

## 2022-12-27 DIAGNOSIS — Z12.11 SCREEN FOR COLON CANCER: ICD-10-CM

## 2022-12-27 DIAGNOSIS — E78.2 MIXED HYPERLIPIDEMIA: ICD-10-CM

## 2022-12-27 PROCEDURE — 1123F ACP DISCUSS/DSCN MKR DOCD: CPT | Performed by: NURSE PRACTITIONER

## 2022-12-27 PROCEDURE — 3078F DIAST BP <80 MM HG: CPT | Performed by: NURSE PRACTITIONER

## 2022-12-27 PROCEDURE — G0439 PPPS, SUBSEQ VISIT: HCPCS | Performed by: NURSE PRACTITIONER

## 2022-12-27 PROCEDURE — 3074F SYST BP LT 130 MM HG: CPT | Performed by: NURSE PRACTITIONER

## 2022-12-27 NOTE — PROGRESS NOTES
[facility] VISIT    Patient is here for their Medicare Annual Wellness Visit     Last eye exam: 11/2022  Last dental exam: 2021  Exercise: walking up and down stairs, walking in good weather  Diet: well balanced    How would you rate your overall health? : Good        Fall Risk 12/20/2022 10/7/2021 10/7/2020 9/10/2019   2 or more falls in past year? no yes no no   Fall with injury in past year? no yes no no       PHQ Scores 12/20/2022 9/26/2022 6/23/2022 10/7/2021 1/7/2021 10/7/2020 2/19/2019   PHQ2 Score 0 0 2 0 0 0 0   PHQ9 Score 0 0 2 0 0 0 0       Do you always wear a seat belt in the car?: Yes      Have you noted any problems with hearing?: No  Have you noted any vision problems?: Yes, having cataract surgery next month  Do you have concerns about your sexual health?: no  In the past month how much has pain been an issue for you?:  A little bit  In the past month have you had issues with anxiety, loneliness, irritability or fatigue:  A little bit    Do you take opioid medications even sometimes? No (if using assess risk and whether other treatments would be beneficial)    Living Will: No,   Copy requested      Healthcare Decision Maker:    Primary Decision MakeJhoan Later - Spouse - [phone]    Secondary Decision Maker: Stacie Elder - Child - [phone]  Click here to complete Healthcare Decision Makers including selection of the Healthcare Decision Maker Relationship (ie \"Primary\"). Today we documented Decision Maker(s) consistent with Legal Next of Kin hierarchy. Who lives at home with you: , daughter, great grandson, grand daughter  Do you have any pets? dog and cat  Do you have any services coming to your home (meals on wheels, home health, etc) ? : no      Do you need help with:  Using the phone:  No  Bathing: No  Dressing:  No  Toileting: No  Transportation:  No  Shopping: No  Preparing meals: No  Housework/Laundry: No  Medications: No  Money management: No    Does your home have:  Unsecured throw rugs: No  Grab bars in bathroom: No  Walk in shower: No  Seat in shower: No  Lit pathways for night (nightlights): Yes    Memory:  Have you or anyone close to you expressed concerns about your memory: No    Knows:  Month: Yes  Day: Yes  Year: Yes  Day of Week: Yes  Able to Recall (tree, fork, ball) : Yes    Patient history:   Patient's medications, allergies, past medical, surgical, social and family histories were reviewed and updated in the EHR under History. Social History     Substance and Sexual Activity   Alcohol Use No       Social History     Substance and Sexual Activity   Drug Use No       Social History     Tobacco Use   Smoking Status Every Day    Packs/day: 0.50    Years: 40.00    Pack years: 20.00    Types: Cigarettes    Start date: 2/1/1990   Smokeless Tobacco Never           Care Team:  Patient's list of care team members was updated in EHR under the Snap Shot. [address]    Immunizations: Reviewed with patient. Health Maintenance Due   Topic Date Due    Breast cancer screen  12/04/2017    Colorectal Cancer Screen  10/01/2022       CHRONIC CONDITIONS / ACUTE COMPLAINTS   Hypertension  Hypoglycemia  Hearing problem of both ears  Osteoarthritis  Cervical disc disease  Polyarthropathy, multiple sites  Other spondylosis, lumbar region  Bilateral primary osteoarthritis of knee  Back pain  Fibromyalgia  Macular degeneration  Tobacco dependence  Other insomnia  Anxiety  Pain medication agreement  Chronic, continuous use of opioids  Mixed hyperlipidemia    Physical Exam:    Body mass index is 24.18 kg/m².   Vitals:    12/27/22 0733   BP: 134/82   Site: Right Upper Arm   Position: Sitting   Cuff Size: Medium Adult   Pulse: 71   Temp: 97.8 °F (36.6 °C)   TempSrc: Infrared   SpO2: 96%   Weight: 159 lb (72.1 kg)   Height: 5' 8\" (1.727 m)     Wt Readings from Last 3 Encounters:   12/27/22 159 lb (72.1 kg)   09/26/22 158 lb (71.7 kg)   09/21/22 157 lb (71.2 kg)       GENERAL:Alert and oriented x 4 NAD, no acute distress, well hydrated, well developed. LUNG:clear to auscultation bilaterally with normal respiratory effort  CV: Normal heart sounds, regular rate and rhythm without murmurs  EXTREMETY: no loss of hair, no edema, normal pedal pulses bilaterally    Was the timed get up and go unsteady or longer than 20 seconds: No    Vision Screen for Initial Exam: no    EKG for Initial Exam at 71 (): no    AAA U/S screen for men 65-75 who smoked (): not applicable    Assessment/Plan:    Randa was seen today for medicare awv. Diagnoses and all orders for this visit:    Encounter for annual wellness visit (AWV) in Medicare patient  Advised routine dental and vision  Increased exercise, healthy diet   Advised living will    Primary hypertension  -     Microalbumin / Creatinine Urine Ratio; Future  -     Comprehensive Metabolic Panel, Fasting; Future  -     CBC with Auto Differential; Future  Controlled    Mixed hyperlipidemia  -     Comprehensive Metabolic Panel, Fasting; Future  -     Lipid, Fasting; Future    Encounter for screening mammogram for malignant neoplasm of breast  -     CRISTOPHER DIGITAL SCREEN W OR WO CAD BILATERAL; Future    Screen for colon cancer  -     AFL - Rafael Vivar MD, Gastroenterology, [facility]          Return for medication re-check.

## 2022-12-29 DIAGNOSIS — I10 HYPERTENSION, UNSPECIFIED TYPE: ICD-10-CM

## 2022-12-29 RX ORDER — PRAVASTATIN SODIUM 20 MG
20 TABLET ORAL EVERY EVENING
Qty: 90 TABLET | Refills: 0 | Status: SHIPPED | OUTPATIENT
Start: 2022-12-29

## 2023-01-02 DIAGNOSIS — M13.0 POLYARTHROPATHY, MULTIPLE SITES: ICD-10-CM

## 2023-01-03 RX ORDER — GABAPENTIN 300 MG/1
CAPSULE ORAL
Qty: 270 CAPSULE | Refills: 0 | Status: SHIPPED | OUTPATIENT
Start: 2023-01-03 | End: 2023-02-02

## 2023-01-10 ENCOUNTER — OFFICE VISIT (OUTPATIENT)
Dept: FAMILY MEDICINE CLINIC | Age: 68
End: 2023-01-10
Payer: MEDICAID

## 2023-01-10 VITALS
WEIGHT: 157 LBS | HEART RATE: 72 BPM | BODY MASS INDEX: 23.87 KG/M2 | SYSTOLIC BLOOD PRESSURE: 122 MMHG | DIASTOLIC BLOOD PRESSURE: 86 MMHG | TEMPERATURE: 97.4 F | OXYGEN SATURATION: 96 %

## 2023-01-10 DIAGNOSIS — H02.403 DROOPY EYELID, BILATERAL: Primary | ICD-10-CM

## 2023-01-10 DIAGNOSIS — Z01.818 PREOP EXAMINATION: ICD-10-CM

## 2023-01-10 PROCEDURE — 99213 OFFICE O/P EST LOW 20 MIN: CPT | Performed by: NURSE PRACTITIONER

## 2023-01-10 PROCEDURE — 3074F SYST BP LT 130 MM HG: CPT | Performed by: NURSE PRACTITIONER

## 2023-01-10 PROCEDURE — 3079F DIAST BP 80-89 MM HG: CPT | Performed by: NURSE PRACTITIONER

## 2023-01-10 PROCEDURE — 1123F ACP DISCUSS/DSCN MKR DOCD: CPT | Performed by: NURSE PRACTITIONER

## 2023-01-10 RX ORDER — LOTEPREDNOL ETABONATE 3.8 MG/G
GEL OPHTHALMIC
COMMUNITY
Start: 2022-10-20

## 2023-01-10 RX ORDER — BROMFENAC SODIUM 0.7 MG/ML
SOLUTION/ DROPS OPHTHALMIC
COMMUNITY
Start: 2022-10-10

## 2023-01-10 ASSESSMENT — PATIENT HEALTH QUESTIONNAIRE - PHQ9
SUM OF ALL RESPONSES TO PHQ QUESTIONS 1-9: 0
1. LITTLE INTEREST OR PLEASURE IN DOING THINGS: 0
SUM OF ALL RESPONSES TO PHQ QUESTIONS 1-9: 0
2. FEELING DOWN, DEPRESSED OR HOPELESS: 0
SUM OF ALL RESPONSES TO PHQ QUESTIONS 1-9: 0
SUM OF ALL RESPONSES TO PHQ9 QUESTIONS 1 & 2: 0
SUM OF ALL RESPONSES TO PHQ QUESTIONS 1-9: 0

## 2023-01-10 NOTE — PROGRESS NOTES
Preoperative Consultation    [address] [facility]  YOB: 1955    This patient presents to the office today for a preoperative consultation at the request of surgeon, Dr. Braeden Babcock, who plans on performing bilateral blephoroplasty on February 9 at [facility].       Planned anesthesia: IV sedation and Topical anesthesia   Known anesthesia problems: None   Bleeding risk: No recent or remote history of abnormal bleeding  Personal or FH of DVT/PE: No      Patient Active Problem List   Diagnosis    Osteoarthritis    Back pain    Hypertension    Fibromyalgia    Hypoglycemia    Hearing problem of both ears    Macular degeneration    Cervical disc disease    Tobacco dependence    Other insomnia    Anxiety    Pain medication agreement    Chronic, continuous use of opioids    Polyarthropathy, multiple sites    Other spondylosis, lumbar region    Mixed hyperlipidemia    Bilateral primary osteoarthritis of knee     Past Surgical History:   Procedure Laterality Date    APPENDECTOMY  02/13/2019    [facility] INJECTION PROCEDURE FOR SACROILIAC JOINT Right 08/31/2020    RIGHT SACROILIAC JOINT INJECTION AND RIGHT GREATER TROCHANTERIC BURSA INJECTION WITH FLUOROSCOPY (57948, 89854) performed by Emanuel Solis MD at [facility]      with ovaries    INNER EAR SURGERY Bilateral     many    KNEE ARTHROSCOPY Right 2009    KNEE ARTHROSCOPY Left     PAIN MANAGEMENT PROCEDURE Right 02/12/2020    RIGHT L4 AND L5 TRANSFORAMINAL EPIDURAL STEROID INJECTION WITH FLUOROSCOPY (06896, 07814) performed by Emanuel Solis MD at [facility] Right 12/02/2020    RIGHT L4 AND L5 TRANSFORAMINAL EPIDURAL STEROID INJECTION WITH FLUOROSCOPY performed by Emanuel Solis MD at [facility] Right 08/31/2021    RIGHT ROBOTIC ASSISTED TOTAL KNEE ARTHROPLASTY - [address] [facility] (39926, 13724) performed by Mala Cui MD at [address] [place] [address] Allergen Reactions    Darvocet [Propoxyphene N-Acetaminophen] Hives    Lipitor [Atorvastatin]      Leg cramping    Propoxyphene Hives    Diclofenac Rash     No outpatient medications have been marked as taking for the 1/10/23 encounter (Office Visit) with PLACIDO Cha NP. Social History     Tobacco Use    Smoking status: Every Day     Packs/day: 1.00     Years: 33.00     Pack years: 33.00     Types: Cigarettes     Start date: 2/1/1990    Smokeless tobacco: Never    Tobacco comments:     Hx 1 1/2 ppd   Substance Use Topics    Alcohol use: No     Family History   Problem Relation Age of Onset    Diabetes Mother     Heart Disease Mother     Emphysema Father     Heart Failure Father     Other Sister         COVID    Early Death Sister         infant    No Known Problems Sister     No Known Problems Sister     No Known Problems Brother        Review of Systems  A comprehensive review of systems was negative except for what was noted in the HPI. Physical Exam   /86 (Site: Right Upper Arm, Position: Sitting, Cuff Size: Medium Adult)   Pulse 72   Temp 97.4 °F (36.3 °C) (Infrared)   Wt 157 lb (71.2 kg)   SpO2 96%   BMI 23.87 kg/m²   Weight: 157 lb (71.2 kg)   Constitutional: She is oriented to person, place, and time. She appears well-developed and well-nourished. No distress. HENT:   Head: Normocephalic and atraumatic. Bilateral droopy eyelids  Mouth/Throat: Uvula is midline, oropharynx is clear and moist and mucous membranes are normal.   Eyes: Conjunctivae and EOM are normal. Pupils are equal, round, and reactive to light. Neck: Trachea normal and normal range of motion. Neck supple. No JVD present. Carotid bruit is not present. No mass and no thyromegaly present. Cardiovascular: Normal rate, regular rhythm, normal heart sounds and intact distal pulses. Exam reveals no gallop and no friction rub. No murmur heard.   Pulmonary/Chest: Effort normal and breath sounds normal. No respiratory distress. She has no wheezes. She has no rales. Abdominal: Soft. Normal aorta and bowel sounds are normal. She exhibits no distension and no mass. There is no hepatosplenomegaly. No tenderness. Musculoskeletal: She exhibits no edema and no tenderness. Neurological: She is alert and oriented to person, place, and time. She has normal strength. No cranial nerve deficit or sensory deficit. Coordination and gait normal.   Skin: Skin is warm and dry. No rash noted. No erythema. EKG Interpretation:  NA. Lab Review yes     Assessment:       Randa was seen today for pre-op exam.    Diagnoses and all orders for this visit:    Droopy eyelid, bilateral    Preop examination    75 y.o. patient  approved for Surgery         Plan:     1. Preoperative workup as follows: none  2. Change in medication regimen before surgery: None  3. No contraindications to planned surgery    Electronically signed by PLACIDO Garrison NP on 1/10/23 at 9:19 AM EST     This dictation was generated by voice recognition computer software. Although all attempts are made to edit the dictation for accuracy, there may be errors in the transcription that are not intended. ALLYSON Leo

## 2023-01-18 ENCOUNTER — TELEPHONE (OUTPATIENT)
Dept: CASE MANAGEMENT | Age: 68
End: 2023-01-18

## 2023-01-18 DIAGNOSIS — G47.09 OTHER INSOMNIA: ICD-10-CM

## 2023-01-18 DIAGNOSIS — F41.9 ANXIETY: ICD-10-CM

## 2023-01-18 RX ORDER — ALPRAZOLAM 0.5 MG/1
TABLET ORAL
Qty: 30 TABLET | Refills: 0 | Status: SHIPPED | OUTPATIENT
Start: 2023-01-18 | End: 2023-02-06

## 2023-01-18 NOTE — TELEPHONE ENCOUNTER
ALPRAZolam (XANAX) 0.5 MG tablet [[id number]]  ENDED    Order Details  Dose, Route, Frequency: As Directed   Dispense Quantity: 30 tablet Refills: 0          Sig: TAKE 1 TABLET BY MOUTH EVERY NIGHT AS NEEDED FOR SLEEP OR ANXIETY   [facility] STORE [place] [address]   Phone:  [phone]  Fax:  [phone]

## 2023-02-03 ENCOUNTER — TELEPHONE (OUTPATIENT)
Dept: FAMILY MEDICINE CLINIC | Age: 68
End: 2023-02-03

## 2023-02-15 DIAGNOSIS — G47.09 OTHER INSOMNIA: ICD-10-CM

## 2023-02-15 DIAGNOSIS — F41.9 ANXIETY: ICD-10-CM

## 2023-02-15 NOTE — TELEPHONE ENCOUNTER
Medication:   Requested Prescriptions     Pending Prescriptions Disp Refills    ALPRAZolam (XANAX) 0.5 MG tablet 30 tablet 0     Sig: TAKE 1 TABLET BY MOUTH EVERY NIGHT AS NEEDED FOR SLEEP OR ANXIETY      Last Filled:  1/18/2023    Patient Phone Number: [phone] (home)     Last appt: 12/27/2023  Next appt: 3/27/2023    Last OARRS:   RX Monitoring 4/24/2019   Attestation The Prescription Monitoring Report for this patient was reviewed today. Periodic Controlled Substance Monitoring -   Chronic Pain > 50 MEDD Obtained or confirmened \"Consent of Opioid Use\" on file.      PDMP Monitoring:    Last PDMP Estelle Garcia as Reviewed [facility]):  Review User Review Instant Review Result   Zandra Bam 12/27/2022  7:49 AM Reviewed PDMP [1]     Preferred Pharmacy:   [place] [address]  [address]  Phone: [phone] Fax: [phone]

## 2023-02-15 NOTE — TELEPHONE ENCOUNTER
ALPRAZolam (XANAX) 0.5 MG tablet [[id number]]  ENDED    Order Details  Dose, Route, Frequency: As Directed   Dispense Quantity: 30 tablet Refills: 0          Sig: TAKE 1 TABLET BY MOUTH EVERY NIGHT AS NEEDED FOR SLEEP OR ANXIETY   [facility] #[id number] - [place], [place] - [address] - P [phone] - F [phone] [address] [facility] [address]   Phone:  [phone]  Fax:  [phone]

## 2023-02-16 RX ORDER — ALPRAZOLAM 0.5 MG/1
TABLET ORAL
Qty: 30 TABLET | Refills: 0 | Status: SHIPPED | OUTPATIENT
Start: 2023-02-16 | End: 2023-03-06

## 2023-03-01 DIAGNOSIS — I10 PRIMARY HYPERTENSION: ICD-10-CM

## 2023-03-01 RX ORDER — CARVEDILOL 6.25 MG/1
TABLET ORAL
Qty: 180 TABLET | Refills: 0 | Status: SHIPPED | OUTPATIENT
Start: 2023-03-01

## 2023-03-01 RX ORDER — RAMIPRIL 10 MG/1
CAPSULE ORAL
Qty: 90 CAPSULE | Refills: 0 | Status: SHIPPED | OUTPATIENT
Start: 2023-03-01

## 2023-03-01 RX ORDER — FUROSEMIDE 80 MG
TABLET ORAL
Qty: 90 TABLET | Refills: 0 | Status: SHIPPED | OUTPATIENT
Start: 2023-03-01

## 2023-03-16 DIAGNOSIS — G47.09 OTHER INSOMNIA: ICD-10-CM

## 2023-03-16 DIAGNOSIS — F41.9 ANXIETY: ICD-10-CM

## 2023-03-16 RX ORDER — ALPRAZOLAM 0.5 MG/1
TABLET ORAL
Qty: 30 TABLET | Refills: 0 | Status: SHIPPED | OUTPATIENT
Start: 2023-03-16 | End: 2023-04-03

## 2023-03-16 NOTE — TELEPHONE ENCOUNTER
ALPRAZolam Roman Chase) 0.5 MG tablet   Maria Dolores [address],  [place] [facility] [address]   Phone:  [phone]  Fax:  [phone]

## 2023-03-16 NOTE — TELEPHONE ENCOUNTER
Medication:   Requested Prescriptions     Pending Prescriptions Disp Refills    ALPRAZolam (XANAX) 0.5 MG tablet 30 tablet 0     Sig: TAKE 1 TABLET BY MOUTH EVERY NIGHT AS NEEDED FOR SLEEP OR ANXIETY      Last Filled:  2/16/2023    Patient Phone Number: [phone] (home)     Last appt: 1/10/2023   Next appt: 3/27/2023    Last OARRS:   RX Monitoring 4/24/2019   Attestation The Prescription Monitoring Report for this patient was reviewed today. Periodic Controlled Substance Monitoring -   Chronic Pain > 50 MEDD Obtained or confirmened \"Consent of Opioid Use\" on file.      PDMP Monitoring:    Last PDMP Gustavo Roberts as Reviewed [facility]):  Review User Review Instant Review Result   Vashti Forde 12/27/2022  7:49 AM Reviewed PDMP [1]     Preferred Pharmacy:   [facility] [place] [facility] [address]  [address]  Phone: [phone] Fax: [phone]

## 2023-03-20 ENCOUNTER — TELEPHONE (OUTPATIENT)
Dept: CASE MANAGEMENT | Age: 68
End: 2023-03-20

## 2023-03-21 ENCOUNTER — OFFICE VISIT (OUTPATIENT)
Dept: FAMILY MEDICINE CLINIC | Age: 68
End: 2023-03-21
Payer: MEDICAID

## 2023-03-21 VITALS — HEART RATE: 74 BPM | BODY MASS INDEX: 22.81 KG/M2 | WEIGHT: 150 LBS | TEMPERATURE: 98.6 F | OXYGEN SATURATION: 95 %

## 2023-03-21 DIAGNOSIS — E78.2 MIXED HYPERLIPIDEMIA: ICD-10-CM

## 2023-03-21 DIAGNOSIS — I10 PRIMARY HYPERTENSION: ICD-10-CM

## 2023-03-21 DIAGNOSIS — J20.9 ACUTE BRONCHITIS, UNSPECIFIED ORGANISM: Primary | ICD-10-CM

## 2023-03-21 LAB
ALBUMIN SERPL-MCNC: 3.9 G/DL (ref 3.4–5)
ALBUMIN/GLOB SERPL: 1.7 {RATIO} (ref 1.1–2.2)
ALP SERPL-CCNC: 52 U/L (ref 40–129)
ALT SERPL-CCNC: 12 U/L (ref 10–40)
ANION GAP SERPL CALCULATED.3IONS-SCNC: 15 MMOL/L (ref 3–16)
AST SERPL-CCNC: 19 U/L (ref 15–37)
BASOPHILS # BLD: 0.1 K/UL (ref 0–0.2)
BASOPHILS NFR BLD: 1 %
BILIRUB SERPL-MCNC: 0.5 MG/DL (ref 0–1)
BUN SERPL-MCNC: 15 MG/DL (ref 7–20)
CALCIUM SERPL-MCNC: 9.7 MG/DL (ref 8.3–10.6)
CHLORIDE SERPL-SCNC: 103 MMOL/L (ref 99–110)
CHOLEST SERPL-MCNC: 167 MG/DL (ref 0–199)
CO2 SERPL-SCNC: 25 MMOL/L (ref 21–32)
CREAT SERPL-MCNC: 0.5 MG/DL (ref 0.6–1.2)
CREAT UR-MCNC: 19.9 MG/DL (ref 28–259)
DEPRECATED RDW RBC AUTO: 14.4 % (ref 12.4–15.4)
EOSINOPHIL # BLD: 0.1 K/UL (ref 0–0.6)
EOSINOPHIL NFR BLD: 1.1 %
GFR SERPLBLD CREATININE-BSD FMLA CKD-EPI: >60 ML/MIN/{1.73_M2}
GLUCOSE P FAST SERPL-MCNC: 109 MG/DL (ref 70–99)
HCT VFR BLD AUTO: 46.7 % (ref 36–48)
HDLC SERPL-MCNC: 44 MG/DL (ref 40–60)
HGB BLD-MCNC: 15.7 G/DL (ref 12–16)
LDL CHOLESTEROL CALCULATED: 98 MG/DL
LYMPHOCYTES # BLD: 2.1 K/UL (ref 1–5.1)
LYMPHOCYTES NFR BLD: 25.3 %
MCH RBC QN AUTO: 31.1 PG (ref 26–34)
MCHC RBC AUTO-ENTMCNC: 33.6 G/DL (ref 31–36)
MCV RBC AUTO: 92.5 FL (ref 80–100)
MICROALBUMIN UR DL<=1MG/L-MCNC: 2.5 MG/DL
MICROALBUMIN/CREAT UR: 125.6 MG/G (ref 0–30)
MONOCYTES # BLD: 0.6 K/UL (ref 0–1.3)
MONOCYTES NFR BLD: 7.4 %
NEUTROPHILS # BLD: 5.5 K/UL (ref 1.7–7.7)
NEUTROPHILS NFR BLD: 65.2 %
PLATELET # BLD AUTO: 299 K/UL (ref 135–450)
PMV BLD AUTO: 8.4 FL (ref 5–10.5)
POTASSIUM SERPL-SCNC: 4.4 MMOL/L (ref 3.5–5.1)
PROT SERPL-MCNC: 6.2 G/DL (ref 6.4–8.2)
RBC # BLD AUTO: 5.05 M/UL (ref 4–5.2)
SODIUM SERPL-SCNC: 143 MMOL/L (ref 136–145)
TRIGL SERPL-MCNC: 123 MG/DL (ref 0–150)
VLDLC SERPL CALC-MCNC: 25 MG/DL
WBC # BLD AUTO: 8.5 K/UL (ref 4–11)

## 2023-03-21 PROCEDURE — 1123F ACP DISCUSS/DSCN MKR DOCD: CPT | Performed by: NURSE PRACTITIONER

## 2023-03-21 PROCEDURE — 99213 OFFICE O/P EST LOW 20 MIN: CPT | Performed by: NURSE PRACTITIONER

## 2023-03-21 RX ORDER — AZITHROMYCIN 250 MG/1
250 TABLET, FILM COATED ORAL SEE ADMIN INSTRUCTIONS
Qty: 6 TABLET | Refills: 0 | Status: SHIPPED | OUTPATIENT
Start: 2023-03-21 | End: 2023-03-26

## 2023-03-21 RX ORDER — METHYLPREDNISOLONE 4 MG/1
TABLET ORAL
Qty: 1 KIT | Refills: 0 | Status: SHIPPED | OUTPATIENT
Start: 2023-03-21 | End: 2023-03-27

## 2023-03-21 RX ORDER — GUAIFENESIN AND CODEINE PHOSPHATE 100; 10 MG/5ML; MG/5ML
5 SOLUTION ORAL 2 TIMES DAILY PRN
Qty: 50 ML | Refills: 0 | Status: SHIPPED | OUTPATIENT
Start: 2023-03-21 | End: 2023-03-26

## 2023-03-21 SDOH — ECONOMIC STABILITY: FOOD INSECURITY: WITHIN THE PAST 12 MONTHS, THE FOOD YOU BOUGHT JUST DIDN'T LAST AND YOU DIDN'T HAVE MONEY TO GET MORE.: NEVER TRUE

## 2023-03-21 SDOH — ECONOMIC STABILITY: HOUSING INSECURITY
IN THE LAST 12 MONTHS, WAS THERE A TIME WHEN YOU DID NOT HAVE A STEADY PLACE TO SLEEP OR SLEPT IN A SHELTER (INCLUDING NOW)?: NO

## 2023-03-21 SDOH — ECONOMIC STABILITY: FOOD INSECURITY: WITHIN THE PAST 12 MONTHS, YOU WORRIED THAT YOUR FOOD WOULD RUN OUT BEFORE YOU GOT MONEY TO BUY MORE.: NEVER TRUE

## 2023-03-21 SDOH — ECONOMIC STABILITY: INCOME INSECURITY: HOW HARD IS IT FOR YOU TO PAY FOR THE VERY BASICS LIKE FOOD, HOUSING, MEDICAL CARE, AND HEATING?: NOT HARD AT ALL

## 2023-03-21 NOTE — PROGRESS NOTES
Kailee Lance  : 1955  Encounter date: 3/21/2023    This dominick 75 y.o. female who presents with  Chief Complaint   Patient presents with    Cough       History of present illness:    HPI Pt is 75year old female with productive cough, sinus drainage x 3 weeks. Pt reports sputum yellow. Pt reports rattling in chest, denies fevers or dyspnea. Pt vaccinated for influenza and covid. Pt has taken OTC delsym and claritin with little relief. Pt is heavy smoker, 92% on RA. Current Outpatient Medications on File Prior to Visit   Medication Sig Dispense Refill    ALPRAZolam (XANAX) 0.5 MG tablet TAKE 1 TABLET BY MOUTH EVERY NIGHT AS NEEDED FOR SLEEP OR ANXIETY 30 tablet 0    furosemide (LASIX) 80 MG tablet TAKE 1 TABLET BY MOUTH EVERY DAY 90 tablet 0    ramipril (ALTACE) 10 MG capsule TAKE 1 CAPSULE BY MOUTH EVERY DAY 90 capsule 0    carvedilol (COREG) 6.25 MG tablet TAKE 1 TABLET BY MOUTH TWICE DAILY 180 tablet 0    PROLENSA 0.07 % SOLN INSTILL 1 DROP INTO SURGICAL EYE(S) ONCE DAILY, STARTING AFTER SURGERY. LOTEMAX SM 0.38 % GEL INSTILL 1 DROP INTO SURGICAL EYE(S) FOUR TIMES A DAY, STARTING AFTER SURGERY. TAPER AS DIRECTED.      gabapentin (NEURONTIN) 300 MG capsule TAKE 1 CAPSULE BY MOUTH THREE TIMES DAILY 270 capsule 0    pravastatin (PRAVACHOL) 20 MG tablet TAKE 1 TABLET BY MOUTH EVERY EVENING 90 tablet 0    ibuprofen (ADVIL;MOTRIN) 200 MG tablet Take 200 mg by mouth every 6 hours as needed for Pain       VITAMIN D PO Take by mouth      Multiple Vitamins-Minerals (THERAPEUTIC MULTIVITAMIN-MINERALS) tablet Take 1 tablet by mouth daily Centrum       No current facility-administered medications on file prior to visit.       Allergies   Allergen Reactions    Darvocet [Propoxyphene N-Acetaminophen] Hives    Lipitor [Atorvastatin]      Leg cramping    Propoxyphene Hives    Diclofenac Rash     Past Medical History:   Diagnosis Date    Anxiety     Fibromyalgia     Gout     Hyperlipidemia     Hypertension

## 2023-03-27 ENCOUNTER — OFFICE VISIT (OUTPATIENT)
Dept: FAMILY MEDICINE CLINIC | Age: 68
End: 2023-03-27
Payer: MEDICAID

## 2023-03-27 VITALS
WEIGHT: 150 LBS | TEMPERATURE: 97.7 F | DIASTOLIC BLOOD PRESSURE: 80 MMHG | BODY MASS INDEX: 22.81 KG/M2 | HEART RATE: 99 BPM | OXYGEN SATURATION: 93 % | SYSTOLIC BLOOD PRESSURE: 122 MMHG

## 2023-03-27 DIAGNOSIS — F41.9 ANXIETY: Primary | ICD-10-CM

## 2023-03-27 DIAGNOSIS — Z72.0 TOBACCO ABUSE: ICD-10-CM

## 2023-03-27 PROCEDURE — 1123F ACP DISCUSS/DSCN MKR DOCD: CPT | Performed by: NURSE PRACTITIONER

## 2023-03-27 PROCEDURE — 3079F DIAST BP 80-89 MM HG: CPT | Performed by: NURSE PRACTITIONER

## 2023-03-27 PROCEDURE — 99214 OFFICE O/P EST MOD 30 MIN: CPT | Performed by: NURSE PRACTITIONER

## 2023-03-27 PROCEDURE — 3074F SYST BP LT 130 MM HG: CPT | Performed by: NURSE PRACTITIONER

## 2023-03-27 RX ORDER — NICOTINE 21 MG/24HR
1 PATCH, TRANSDERMAL 24 HOURS TRANSDERMAL DAILY
Qty: 42 PATCH | Refills: 0 | Status: SHIPPED | OUTPATIENT
Start: 2023-03-27 | End: 2023-05-08

## 2023-03-27 NOTE — PROGRESS NOTES
kg)     BP Readings from Last 3 Encounters:   03/27/23 122/80   01/10/23 122/86   12/27/22 134/82     Wt Readings from Last 3 Encounters:   03/27/23 150 lb (68 kg)   03/21/23 150 lb (68 kg)   01/10/23 157 lb (71.2 kg)     BMI Readings from Last 3 Encounters:   03/27/23 22.81 kg/m²   03/21/23 22.81 kg/m²   01/10/23 23.87 kg/m²       Physical Exam  Vitals reviewed. Constitutional:       Appearance: Normal appearance. She is well-developed. Cardiovascular:      Rate and Rhythm: Normal rate and regular rhythm. Pulses: Normal pulses. Heart sounds: Normal heart sounds. No murmur heard. Pulmonary:      Effort: Pulmonary effort is normal.      Breath sounds: Normal breath sounds. Skin:     General: Skin is warm and dry. Neurological:      Mental Status: She is alert and oriented to person, place, and time. Psychiatric:         Mood and Affect: Mood normal.         Behavior: Behavior normal.         Thought Content: Thought content normal.         Judgment: Judgment normal.       Assessment/Plan    1. Anxiety  OARRS reviewed  controlled    2. Tobacco abuse  Advised CT lung screen  - nicotine (NICODERM CQ) 21 MG/24HR; Place 1 patch onto the skin daily  Dispense: 42 patch; Refill: 0    Return in about 3 months (around 6/27/2023) for medication re-check. This dictation was generated by voice recognition computer software. Although all attempts are made to edit the dictation for accuracy, there may be errors in the transcription that are not intended.

## 2023-03-29 DIAGNOSIS — I10 HYPERTENSION, UNSPECIFIED TYPE: ICD-10-CM

## 2023-03-29 RX ORDER — PRAVASTATIN SODIUM 20 MG
20 TABLET ORAL EVERY EVENING
Qty: 90 TABLET | Refills: 0 | Status: SHIPPED | OUTPATIENT
Start: 2023-03-29

## 2023-04-04 DIAGNOSIS — M13.0 POLYARTHROPATHY, MULTIPLE SITES: ICD-10-CM

## 2023-04-04 RX ORDER — GABAPENTIN 300 MG/1
CAPSULE ORAL
Qty: 270 CAPSULE | Refills: 0 | Status: SHIPPED | OUTPATIENT
Start: 2023-04-04 | End: 2023-05-04

## 2023-04-17 DIAGNOSIS — G47.09 OTHER INSOMNIA: ICD-10-CM

## 2023-04-17 DIAGNOSIS — F41.9 ANXIETY: ICD-10-CM

## 2023-04-17 RX ORDER — ALPRAZOLAM 0.5 MG/1
TABLET ORAL
Qty: 30 TABLET | Refills: 0 | Status: SHIPPED | OUTPATIENT
Start: 2023-04-17 | End: 2023-05-05

## 2023-04-17 NOTE — TELEPHONE ENCOUNTER
Medication:   Requested Prescriptions     Pending Prescriptions Disp Refills    ALPRAZolam (XANAX) 0.5 MG tablet 30 tablet 0     Sig: TAKE 1 TABLET BY MOUTH EVERY NIGHT AS NEEDED FOR SLEEP OR ANXIETY      Last Filled:  3/16/2023    Patient Phone Number: [phone] (home)     Last appt: 3/27/2023   Next appt: 6/28/2023    Last OARRS:   RX Monitoring 4/24/2019   Attestation The Prescription Monitoring Report for this patient was reviewed today. Periodic Controlled Substance Monitoring -   Chronic Pain > 50 MEDD Obtained or confirmened \"Consent of Opioid Use\" on file.      PDMP Monitoring:    Last PDMP Minoo Muller as Reviewed [facility]):  Review User Review Instant Review Result   Michael Brody 3/27/2023  9:48 AM Reviewed PDMP [1]     Preferred Pharmacy:   [facility]   [address]  Phone: [phone] Fax: [phone]

## 2023-04-17 NOTE — TELEPHONE ENCOUNTER
ALPRAZolam (XANAX) 0.5 MG tablet [[id number]]  [address], [address] [place] [address] [place]   Phone:  [phone]  Fax:  [phone]

## 2023-05-03 ENCOUNTER — HOSPITAL ENCOUNTER (OUTPATIENT)
Dept: CT IMAGING | Age: 68
Discharge: HOME | End: 2023-05-03
Payer: MEDICAID

## 2023-05-03 DIAGNOSIS — F17.200 SMOKER: ICD-10-CM

## 2023-05-03 PROCEDURE — 71271 CT THORAX LUNG CANCER SCR C-: CPT

## 2023-05-16 DIAGNOSIS — F41.9 ANXIETY: ICD-10-CM

## 2023-05-16 DIAGNOSIS — G47.09 OTHER INSOMNIA: ICD-10-CM

## 2023-05-16 RX ORDER — ALPRAZOLAM 0.5 MG/1
TABLET ORAL
Qty: 30 TABLET | Refills: 0 | Status: SHIPPED | OUTPATIENT
Start: 2023-05-16 | End: 2023-06-03

## 2023-05-16 NOTE — TELEPHONE ENCOUNTER
----- Message from Chika Yin sent at 5/16/2023  2:38 PM EDT -----  Subject: Refill Request    QUESTIONS  Name of Medication? ALPRAZolam (XANAX) 0.5 MG tablet  Patient-reported dosage and instructions? 0.5 1 pill daily  How many days do you have left? 3  Preferred Pharmacy? [id number]  Pharmacy phone number (if available)? [phone]  ---------------------------------------------------------------------------  --------------  Eulogio SIMMS  What is the best way for the office to contact you? OK to leave message on   voicemail  Preferred Call Back Phone Number? [phone]  ---------------------------------------------------------------------------  --------------  SCRIPT ANSWERS  Relationship to Patient?  Self

## 2023-05-16 NOTE — TELEPHONE ENCOUNTER
Medication:   Requested Prescriptions     Pending Prescriptions Disp Refills    ALPRAZolam (XANAX) 0.5 MG tablet 30 tablet 0     Sig: TAKE 1 TABLET BY MOUTH EVERY NIGHT AS NEEDED FOR SLEEP OR ANXIETY      Last Filled:  4/17/2023    Patient Phone Number: [phone] (home)     Last appt: 3/27/2023   Next appt: 6/28/2023    Last OARRS:   RX Monitoring 4/24/2019   Attestation The Prescription Monitoring Report for this patient was reviewed today. Periodic Controlled Substance Monitoring -   Chronic Pain > 50 MEDD Obtained or confirmened \"Consent of Opioid Use\" on file.      PDMP Monitoring:    Last PDMP Abbi Sol as Reviewed [facility]):  Review User Review Instant Review Result   Marii Taylor 3/27/2023  9:48 AM Reviewed PDMP [1]     Preferred Pharmacy:   [address]  [address]  Phone: [phone] Fax: [phone]

## 2023-05-27 DIAGNOSIS — I10 PRIMARY HYPERTENSION: ICD-10-CM

## 2023-05-30 RX ORDER — RAMIPRIL 10 MG/1
CAPSULE ORAL
Qty: 90 CAPSULE | Refills: 0 | Status: SHIPPED | OUTPATIENT
Start: 2023-05-30

## 2023-05-30 RX ORDER — CARVEDILOL 6.25 MG/1
TABLET ORAL
Qty: 180 TABLET | Refills: 0 | Status: SHIPPED | OUTPATIENT
Start: 2023-05-30

## 2023-05-30 RX ORDER — FUROSEMIDE 80 MG
TABLET ORAL
Qty: 90 TABLET | Refills: 0 | Status: SHIPPED | OUTPATIENT
Start: 2023-05-30

## 2023-06-27 DIAGNOSIS — I10 HYPERTENSION, UNSPECIFIED TYPE: ICD-10-CM

## 2023-06-27 RX ORDER — PRAVASTATIN SODIUM 20 MG
20 TABLET ORAL EVERY EVENING
Qty: 90 TABLET | Refills: 0 | Status: SHIPPED | OUTPATIENT
Start: 2023-06-27

## 2023-06-28 ENCOUNTER — OFFICE VISIT (OUTPATIENT)
Dept: FAMILY MEDICINE CLINIC | Age: 68
End: 2023-06-28
Payer: MEDICAID

## 2023-06-28 VITALS
DIASTOLIC BLOOD PRESSURE: 86 MMHG | HEART RATE: 58 BPM | BODY MASS INDEX: 22.5 KG/M2 | TEMPERATURE: 97.4 F | SYSTOLIC BLOOD PRESSURE: 124 MMHG | WEIGHT: 148 LBS | OXYGEN SATURATION: 97 %

## 2023-06-28 DIAGNOSIS — F41.9 ANXIETY: Primary | ICD-10-CM

## 2023-06-28 LAB — MAMMOGRAPHY, EXTERNAL: NORMAL

## 2023-06-28 PROCEDURE — 3079F DIAST BP 80-89 MM HG: CPT | Performed by: NURSE PRACTITIONER

## 2023-06-28 PROCEDURE — 1123F ACP DISCUSS/DSCN MKR DOCD: CPT | Performed by: NURSE PRACTITIONER

## 2023-06-28 PROCEDURE — 99213 OFFICE O/P EST LOW 20 MIN: CPT | Performed by: NURSE PRACTITIONER

## 2023-06-28 PROCEDURE — 3074F SYST BP LT 130 MM HG: CPT | Performed by: NURSE PRACTITIONER

## 2023-07-03 DIAGNOSIS — M13.0 POLYARTHROPATHY, MULTIPLE SITES: ICD-10-CM

## 2023-07-05 RX ORDER — GABAPENTIN 300 MG/1
CAPSULE ORAL
Qty: 270 CAPSULE | Refills: 0 | Status: SHIPPED | OUTPATIENT
Start: 2023-07-05 | End: 2023-08-04

## 2023-07-17 DIAGNOSIS — F41.9 ANXIETY: ICD-10-CM

## 2023-07-17 DIAGNOSIS — G47.09 OTHER INSOMNIA: ICD-10-CM

## 2023-07-17 RX ORDER — ALPRAZOLAM 0.5 MG/1
TABLET ORAL
Qty: 30 TABLET | Refills: 0 | Status: SHIPPED | OUTPATIENT
Start: 2023-07-17 | End: 2023-08-04

## 2023-07-17 NOTE — TELEPHONE ENCOUNTER
Medication:   Requested Prescriptions     Pending Prescriptions Disp Refills    ALPRAZolam (XANAX) 0.5 MG tablet 30 tablet 0     Sig: TAKE 1 TABLET BY MOUTH EVERY NIGHT AS NEEDED FOR SLEEP OR ANXIETY      Last Filled:  6/14/2023    Patient Phone Number: [phone] (home)     Last appt: 6/28/2023   Next appt: 9/28/2023    Last OARRS:   RX Monitoring 4/24/2019   Attestation The Prescription Monitoring Report for this patient was reviewed today. Periodic Controlled Substance Monitoring -   Chronic Pain > 50 MEDD Obtained or confirmened \"Consent of Opioid Use\" on file.      PDMP Monitoring:    Last PDMP Thanh Nichols as Reviewed [facility]):  Review User Review Instant Review Result   Allison Sarabiachen 6/28/2023  9:38 AM Reviewed PDMP [1]     Preferred Pharmacy:   [place] [address], [place] - [address] [facility]   [address]  Phone: [phone] Fax: [phone]

## 2023-07-17 NOTE — TELEPHONE ENCOUNTER
ALPRAZolam Evaristoa Hayder) 0.5 MG tablet       Katherine Rick [address], [place] - [address]ada Loaiza [facility], [address] [facility] [address]   Phone:  [phone]  Fax:  [phone]

## 2023-08-14 DIAGNOSIS — G47.09 OTHER INSOMNIA: ICD-10-CM

## 2023-08-14 DIAGNOSIS — F41.9 ANXIETY: ICD-10-CM

## 2023-08-14 RX ORDER — ALPRAZOLAM 0.5 MG/1
TABLET ORAL
Qty: 30 TABLET | Refills: 0 | Status: SHIPPED | OUTPATIENT
Start: 2023-08-14 | End: 2023-09-01

## 2023-08-14 NOTE — TELEPHONE ENCOUNTER
ALPRAZolam Rere Luo) 0.5 MG tablet     [address], [place] - [address] Loaiza [facility], [address] [address]   Phone:  [phone]  Fax:  [phone]

## 2023-08-14 NOTE — TELEPHONE ENCOUNTER
Medication:   Requested Prescriptions     Pending Prescriptions Disp Refills    ALPRAZolam (XANAX) 0.5 MG tablet 30 tablet 0     Sig: TAKE 1 TABLET BY MOUTH EVERY NIGHT AS NEEDED FOR SLEEP OR ANXIETY      Last Filled:  7/17/2023    Patient Phone Number: [phone] (home)     Last appt: 6/28/2023   Next appt: 9/28/2023    Last OARRS:   RX Monitoring 4/24/2019   Attestation The Prescription Monitoring Report for this patient was reviewed today. Periodic Controlled Substance Monitoring -   Chronic Pain > 50 MEDD Obtained or confirmened \"Consent of Opioid Use\" on file.      PDMP Monitoring:    Last PDMP Irving Alvarado as Reviewed [facility]):  Review User Review Instant Review Result   Betito Cali 6/28/2023  9:38 AM Reviewed PDMP [1]     Preferred Pharmacy:   [place] [address], [place] - [address] [facility]   [address]  Phone: [phone] Fax: [phone]

## 2023-08-25 DIAGNOSIS — I10 PRIMARY HYPERTENSION: ICD-10-CM

## 2023-08-25 RX ORDER — CARVEDILOL 6.25 MG/1
TABLET ORAL
Qty: 180 TABLET | Refills: 0 | Status: SHIPPED | OUTPATIENT
Start: 2023-08-25

## 2023-08-25 RX ORDER — RAMIPRIL 10 MG/1
CAPSULE ORAL
Qty: 90 CAPSULE | Refills: 0 | Status: SHIPPED | OUTPATIENT
Start: 2023-08-25

## 2023-08-25 RX ORDER — FUROSEMIDE 80 MG
TABLET ORAL
Qty: 90 TABLET | Refills: 0 | Status: SHIPPED | OUTPATIENT
Start: 2023-08-25

## 2023-09-14 DIAGNOSIS — G47.09 OTHER INSOMNIA: ICD-10-CM

## 2023-09-14 DIAGNOSIS — F41.9 ANXIETY: ICD-10-CM

## 2023-09-14 RX ORDER — ALPRAZOLAM 0.5 MG/1
TABLET ORAL
Qty: 30 TABLET | Refills: 0 | Status: SHIPPED | OUTPATIENT
Start: 2023-09-14 | End: 2023-10-02

## 2023-09-14 NOTE — TELEPHONE ENCOUNTER
ALPRAZolam Jose L Greek) 0.5 MG tablet    [address], [place] - [address] Loaiza [facility], [address] [facility] [address]   Phone:  [phone]  Fax:  [phone]

## 2023-09-14 NOTE — TELEPHONE ENCOUNTER
Medication:   Requested Prescriptions     Pending Prescriptions Disp Refills    ALPRAZolam (XANAX) 0.5 MG tablet 30 tablet 0     Sig: TAKE 1 TABLET BY MOUTH EVERY NIGHT AS NEEDED FOR SLEEP OR ANXIETY      Last Filled:  8/14/2023    Patient Phone Number: [phone] (home)     Last appt: 6/28/2023   Next appt: 9/28/2023    Last OARRS:       4/24/2019     5:06 PM   RX Monitoring   Attestation The Prescription Monitoring Report for this patient was reviewed today. Chronic Pain > 50 MEDD Obtained or confirmened \"Consent of Opioid Use\" on file.      PDMP Monitoring:    Last PDMP Kevin Degroot as Reviewed [facility]):  Review User Review Instant Review Result   Whitley Gustavo 6/28/2023  9:38 AM Reviewed PDMP [1]     Preferred Pharmacy:   Yanira Jiang [facility] [address], [place] - [address]   [address] [place] [address] Drive  Phone: [phone] Fax: [phone]

## 2023-09-25 DIAGNOSIS — I10 HYPERTENSION, UNSPECIFIED TYPE: ICD-10-CM

## 2023-09-25 RX ORDER — PRAVASTATIN SODIUM 20 MG
20 TABLET ORAL EVERY EVENING
Qty: 90 TABLET | Refills: 0 | Status: SHIPPED | OUTPATIENT
Start: 2023-09-25

## 2023-09-28 ENCOUNTER — OFFICE VISIT (OUTPATIENT)
Dept: FAMILY MEDICINE CLINIC | Age: 68
End: 2023-09-28
Payer: MEDICAID

## 2023-09-28 VITALS
BODY MASS INDEX: 22.35 KG/M2 | DIASTOLIC BLOOD PRESSURE: 80 MMHG | WEIGHT: 147 LBS | HEART RATE: 75 BPM | TEMPERATURE: 97.3 F | SYSTOLIC BLOOD PRESSURE: 138 MMHG | OXYGEN SATURATION: 93 %

## 2023-09-28 DIAGNOSIS — F41.9 ANXIETY: Primary | ICD-10-CM

## 2023-09-28 DIAGNOSIS — Z23 NEED FOR VACCINATION: ICD-10-CM

## 2023-09-28 PROCEDURE — 90694 VACC AIIV4 NO PRSRV 0.5ML IM: CPT | Performed by: NURSE PRACTITIONER

## 2023-09-28 PROCEDURE — 3075F SYST BP GE 130 - 139MM HG: CPT | Performed by: NURSE PRACTITIONER

## 2023-09-28 PROCEDURE — 99213 OFFICE O/P EST LOW 20 MIN: CPT | Performed by: NURSE PRACTITIONER

## 2023-09-28 PROCEDURE — 90471 IMMUNIZATION ADMIN: CPT | Performed by: NURSE PRACTITIONER

## 2023-09-28 PROCEDURE — 3079F DIAST BP 80-89 MM HG: CPT | Performed by: NURSE PRACTITIONER

## 2023-09-28 PROCEDURE — 1123F ACP DISCUSS/DSCN MKR DOCD: CPT | Performed by: NURSE PRACTITIONER

## 2023-09-28 NOTE — PROGRESS NOTES
Sonia Westbrook  : 1955  Encounter date: 2023    This dominick 70 y.o. female who presents with  Chief Complaint   Patient presents with    Medication Check    Anxiety       History of present illness:    HPI Pt is 70year old female for medication check on xanax for sleep and anxiety, well controlled. Pt recently returned to work PT, no new concerns, doing well. Due for AWV in 3 months. Current Outpatient Medications on File Prior to Visit   Medication Sig Dispense Refill    pravastatin (PRAVACHOL) 20 MG tablet TAKE 1 TABLET BY MOUTH EVERY EVENING 90 tablet 0    ALPRAZolam (XANAX) 0.5 MG tablet TAKE 1 TABLET BY MOUTH EVERY NIGHT AS NEEDED FOR SLEEP OR ANXIETY 30 tablet 0    furosemide (LASIX) 80 MG tablet TAKE 1 TABLET BY MOUTH EVERY DAY 90 tablet 0    ramipril (ALTACE) 10 MG capsule TAKE 1 CAPSULE BY MOUTH EVERY DAY 90 capsule 0    carvedilol (COREG) 6.25 MG tablet TAKE 1 TABLET BY MOUTH TWICE DAILY 180 tablet 0    gabapentin (NEURONTIN) 300 MG capsule TAKE 1 CAPSULE BY MOUTH THREE TIMES DAILY 270 capsule 0    nicotine (NICODERM CQ) 21 MG/24HR Place 1 patch onto the skin daily 42 patch 0    ibuprofen (ADVIL;MOTRIN) 200 MG tablet Take 1 tablet by mouth every 6 hours as needed for Pain      VITAMIN D PO Take by mouth      Multiple Vitamins-Minerals (THERAPEUTIC MULTIVITAMIN-MINERALS) tablet Take 1 tablet by mouth daily Centrum       No current facility-administered medications on file prior to visit.       Allergies   Allergen Reactions    Darvocet [Propoxyphene N-Acetaminophen] Hives    Lipitor [Atorvastatin]      Leg cramping    Propoxyphene Hives    Diclofenac Rash     Past Medical History:   Diagnosis Date    Anxiety     Fibromyalgia     Gout     Hyperlipidemia     Hypertension     Osteoarthritis       Past Surgical History:   Procedure Laterality Date    APPENDECTOMY  2019    [facility]. INJECTION PROCEDURE FOR SACROILIAC JOINT Right 2020    RIGHT SACROILIAC JOINT INJECTION AND RIGHT GREATER

## 2023-10-01 DIAGNOSIS — M13.0 POLYARTHROPATHY, MULTIPLE SITES: ICD-10-CM

## 2023-10-02 RX ORDER — GABAPENTIN 300 MG/1
CAPSULE ORAL
Qty: 270 CAPSULE | Refills: 0 | Status: SHIPPED | OUTPATIENT
Start: 2023-10-02 | End: 2023-11-01

## 2023-10-12 DIAGNOSIS — G47.09 OTHER INSOMNIA: ICD-10-CM

## 2023-10-12 DIAGNOSIS — F41.9 ANXIETY: ICD-10-CM

## 2023-10-12 RX ORDER — ALPRAZOLAM 0.5 MG/1
TABLET ORAL
Qty: 30 TABLET | Refills: 0 | Status: SHIPPED | OUTPATIENT
Start: 2023-10-12 | End: 2023-10-30

## 2023-10-12 NOTE — TELEPHONE ENCOUNTER
Medication:   Requested Prescriptions     Pending Prescriptions Disp Refills    ALPRAZolam (XANAX) 0.5 MG tablet 30 tablet 0     Sig: TAKE 1 TABLET BY MOUTH EVERY NIGHT AS NEEDED FOR SLEEP OR ANXIETY      Last Filled:  9/14/23    Patient Phone Number: [phone] (home)     Last appt: 9/28/2023   Next appt: 12/28/2023    Last OARRS:       4/24/2019     5:06 PM   RX Monitoring   Attestation The Prescription Monitoring Report for this patient was reviewed today. Chronic Pain > 50 MEDD Obtained or confirmened \"Consent of Opioid Use\" on file.      PDMP Monitoring:    Last PDMP Margo Londono as Reviewed [facility]):  Review User Review Instant Review Result   Bruna Raines 9/28/2023  8:44 AM Reviewed PDMP [1]     Preferred Pharmacy:   [address], [place] - [address] [facility] [address] [place] [address]  Phone: [phone] Fax: [phone]

## 2023-11-08 NOTE — LETTER
[place] [address]  Phone: [phone]  Fax: [address], [place]      January 3, 2020    PLACIDO Eng NP  [address] [facility] [address]    Patient: Quincy Ferreira  MR Number: [id number]  YOB: 1955  Date of Visit: 1/3/2020    Dear Dr. Isaias Cali      We all got into medicine to help and heal. One of the most devastating things to have is constant chronic pain. It means so much to us to be able to make a difference in people's lives. It means even more when someone like yourself has confidence in my ability to contribute. I am very appreciative for the chance to make a difference in your patient's life. Thank you for the request for consultation for Lily Dunham for the evaluation of lower back and right leg pain. Below are the relevant portions of my assessment and plan of care. If you have questions, please do not hesitate to call me. I look forward to following Randa along with you.     Sincerely,         PAULIE Hair, PA-C  Board Certified by the [employer] on Certification of Physician Assistants  [facility] [place]  Partner of [facility]) T2DM (type 2 diabetes mellitus)

## 2023-11-14 DIAGNOSIS — G47.09 OTHER INSOMNIA: ICD-10-CM

## 2023-11-14 DIAGNOSIS — F41.9 ANXIETY: ICD-10-CM

## 2023-11-14 RX ORDER — ALPRAZOLAM 0.5 MG/1
TABLET ORAL
Qty: 30 TABLET | Refills: 0 | Status: SHIPPED | OUTPATIENT
Start: 2023-11-14 | End: 2023-12-02

## 2023-11-14 NOTE — TELEPHONE ENCOUNTER
[facility]) 0.[facility] MG tablet    Yue Garnica [address], [place] - [address]ada Loaiza [facility], [address] [facility] [address]   Phone:  [phone]  Fax:  [phone]

## 2023-11-14 NOTE — TELEPHONE ENCOUNTER
Medication:   Requested Prescriptions     Pending Prescriptions Disp Refills    ALPRAZolam (XANAX) 0.5 MG tablet 30 tablet 0     Sig: TAKE 1 TABLET BY MOUTH EVERY NIGHT AS NEEDED FOR SLEEP OR ANXIETY      Last Filled:  10/12/2023    Patient Phone Number: [phone] (home)     Last appt: 9/28/2023   Next appt: 12/28/2023    Last OARRS:       4/24/2019     5:06 PM   RX Monitoring   Attestation The Prescription Monitoring Report for this patient was reviewed today. Chronic Pain > 50 MEDD Obtained or confirmened \"Consent of Opioid Use\" on file.      PDMP Monitoring:    Last PDMP Stephen Rothman as Reviewed [facility]):  Review User Review Instant Review Result   Estiven Arvizuchase 9/28/2023  8:44 AM Reviewed PDMP [1]     Preferred Pharmacy:   Justin Arden [address], [place] - [address] [facility] [address] [place] [address]  Phone: [phone] Fax: [phone]

## 2023-11-19 DIAGNOSIS — I10 PRIMARY HYPERTENSION: ICD-10-CM

## 2023-11-20 RX ORDER — FUROSEMIDE 80 MG
TABLET ORAL
Qty: 90 TABLET | Refills: 0 | Status: SHIPPED | OUTPATIENT
Start: 2023-11-20

## 2023-11-20 RX ORDER — RAMIPRIL 10 MG/1
CAPSULE ORAL
Qty: 90 CAPSULE | Refills: 0 | Status: SHIPPED | OUTPATIENT
Start: 2023-11-20

## 2023-11-20 RX ORDER — CARVEDILOL 6.25 MG/1
TABLET ORAL
Qty: 180 TABLET | Refills: 0 | Status: SHIPPED | OUTPATIENT
Start: 2023-11-20

## 2023-11-20 NOTE — TELEPHONE ENCOUNTER
Medication:   Requested Prescriptions     Pending Prescriptions Disp Refills    furosemide (LASIX) 80 MG tablet [Pharmacy Med Name: FUROSEMIDE 80MG TABLETS] 90 tablet 0     Sig: TAKE 1 TABLET BY MOUTH EVERY DAY    ramipril (ALTACE) 10 MG capsule [Pharmacy Med Name: RAMIPRIL 10MG CAPSULES] 90 capsule 0     Sig: TAKE 1 CAPSULE BY MOUTH EVERY DAY    carvedilol (COREG) 6.25 MG tablet [Pharmacy Med Name: CARVEDILOL 6.25MG TABLETS] 180 tablet 0     Sig: TAKE 1 TABLET BY MOUTH TWICE DAILY      Last Filled:  8/25/2023, 8/25/2023, 8/25/2023    Patient Phone Number: [phone] (home)     Last appt: 9/28/2023   Next appt: 12/28/2023    Last OARRS:       4/24/2019     5:06 PM   RX Monitoring   Attestation The Prescription Monitoring Report for this patient was reviewed today. Chronic Pain > 50 MEDD Obtained or confirmened \"Consent of Opioid Use\" on file.      PDMP Monitoring:    Last PDMP Segun Kessler as Reviewed [facility]):  Review User Review Instant Review Result   Mila Vergara 9/28/2023  8:44 AM Reviewed PDMP [1]     Preferred Pharmacy:   [facility] [place] [address] [place] [address], [place] - [address] [facility] [address] [place] [address]  Phone: [phone] Fax: [phone]

## 2023-12-12 DIAGNOSIS — F41.9 ANXIETY: ICD-10-CM

## 2023-12-12 DIAGNOSIS — G47.09 OTHER INSOMNIA: ICD-10-CM

## 2023-12-12 RX ORDER — ALPRAZOLAM 0.5 MG/1
TABLET ORAL
Qty: 30 TABLET | Refills: 0 | Status: SHIPPED | OUTPATIENT
Start: 2023-12-12 | End: 2023-12-30

## 2023-12-12 NOTE — TELEPHONE ENCOUNTER
ALPRAZolam Caryn Dominguez) 0.5 MG tablet       [place] [address], [place] - [address] Loaiza [facility], [address] [address]  Phone: [phone]  Fax: [phone]

## 2023-12-12 NOTE — TELEPHONE ENCOUNTER
Medication:   Requested Prescriptions     Pending Prescriptions Disp Refills    ALPRAZolam (XANAX) 0.5 MG tablet 30 tablet 0     Sig: TAKE 1 TABLET BY MOUTH EVERY NIGHT AS NEEDED FOR SLEEP OR ANXIETY      Last Filled:  11/14/2023    Patient Phone Number: [phone] (home)     Last appt: 9/28/2023   Next appt: 12/28/2023    Last OARRS:       4/24/2019     5:06 PM   RX Monitoring   Attestation The Prescription Monitoring Report for this patient was reviewed today. Chronic Pain > 50 MEDD Obtained or confirmened \"Consent of Opioid Use\" on file.      PDMP Monitoring:    Last PDMP oFrrest Wong as Reviewed [facility]):  Review User Review Instant Review Result   Pastor Thomson 9/28/2023  8:44 AM Reviewed PDMP [1]     Preferred Pharmacy:   [address], [place] - [address] [facility] [address] [place] [address]  Phone: [phone] Fax: [phone]

## 2023-12-24 DIAGNOSIS — I10 HYPERTENSION, UNSPECIFIED TYPE: ICD-10-CM

## 2023-12-27 DIAGNOSIS — M13.0 POLYARTHROPATHY, MULTIPLE SITES: ICD-10-CM

## 2023-12-27 RX ORDER — GABAPENTIN 300 MG/1
CAPSULE ORAL
Qty: 270 CAPSULE | Refills: 0 | Status: SHIPPED | OUTPATIENT
Start: 2023-12-27 | End: 2024-01-26

## 2023-12-27 RX ORDER — PRAVASTATIN SODIUM 20 MG
20 TABLET ORAL EVERY EVENING
Qty: 90 TABLET | Refills: 0 | Status: SHIPPED | OUTPATIENT
Start: 2023-12-27

## 2023-12-28 ENCOUNTER — OFFICE VISIT (OUTPATIENT)
Dept: FAMILY MEDICINE CLINIC | Age: 68
End: 2023-12-28
Payer: MEDICARE

## 2023-12-28 VITALS
SYSTOLIC BLOOD PRESSURE: 132 MMHG | HEIGHT: 68 IN | TEMPERATURE: 97.6 F | BODY MASS INDEX: 22.28 KG/M2 | OXYGEN SATURATION: 94 % | HEART RATE: 60 BPM | WEIGHT: 147 LBS | DIASTOLIC BLOOD PRESSURE: 84 MMHG

## 2023-12-28 DIAGNOSIS — F41.9 ANXIETY: ICD-10-CM

## 2023-12-28 DIAGNOSIS — I10 PRIMARY HYPERTENSION: ICD-10-CM

## 2023-12-28 DIAGNOSIS — E78.2 MIXED HYPERLIPIDEMIA: ICD-10-CM

## 2023-12-28 DIAGNOSIS — Z00.00 ENCOUNTER FOR ANNUAL WELLNESS VISIT (AWV) IN MEDICARE PATIENT: Primary | ICD-10-CM

## 2023-12-28 PROCEDURE — G8484 FLU IMMUNIZE NO ADMIN: HCPCS | Performed by: NURSE PRACTITIONER

## 2023-12-28 PROCEDURE — 1123F ACP DISCUSS/DSCN MKR DOCD: CPT | Performed by: NURSE PRACTITIONER

## 2023-12-28 PROCEDURE — G0439 PPPS, SUBSEQ VISIT: HCPCS | Performed by: NURSE PRACTITIONER

## 2023-12-28 PROCEDURE — 3017F COLORECTAL CA SCREEN DOC REV: CPT | Performed by: NURSE PRACTITIONER

## 2023-12-28 PROCEDURE — 3079F DIAST BP 80-89 MM HG: CPT | Performed by: NURSE PRACTITIONER

## 2023-12-28 PROCEDURE — 3075F SYST BP GE 130 - 139MM HG: CPT | Performed by: NURSE PRACTITIONER

## 2024-01-12 DIAGNOSIS — F41.9 ANXIETY: ICD-10-CM

## 2024-01-12 DIAGNOSIS — G47.09 OTHER INSOMNIA: ICD-10-CM

## 2024-01-12 NOTE — TELEPHONE ENCOUNTER
Medication:   Requested Prescriptions     Pending Prescriptions Disp Refills    ALPRAZolam (XANAX) 0.5 MG tablet 30 tablet 0     Sig: TAKE 1 TABLET BY MOUTH EVERY NIGHT AS NEEDED FOR SLEEP OR ANXIETY      Last Filled:  12/12    Patient Phone Number: [phone] (home)     Last appt: 12/28/2023   Next appt: 4/1/2024    Last OARRS:       4/24/2019     5:06 PM   RX Monitoring   Attestation The Prescription Monitoring Report for this patient was reviewed today.   Chronic Pain > 50 MEDD Obtained or confirmened \"Consent of Opioid Use\" on file.     PDMP Monitoring:    Last PDMP Cooper as Reviewed ([place]):  Review User Review Instant Review Result   FERNANDO DUTTA 12/28/2023  8:40 AM Reviewed PDMP [1]     Preferred Pharmacy:   [facility] #[id number] [place], [place] - [address] [facility] -  [phone] - F [phone]  [address] [facility] [address]  Phone: [phone] Fax: [phone]

## 2024-01-15 RX ORDER — ALPRAZOLAM 0.5 MG/1
TABLET ORAL
Qty: 30 TABLET | Refills: 0 | Status: SHIPPED | OUTPATIENT
Start: 2024-01-15 | End: 2024-01-30

## 2024-02-12 DIAGNOSIS — F41.9 ANXIETY: ICD-10-CM

## 2024-02-12 DIAGNOSIS — G47.09 OTHER INSOMNIA: ICD-10-CM

## 2024-02-12 RX ORDER — ALPRAZOLAM 0.5 MG/1
TABLET ORAL
Qty: 30 TABLET | Refills: 0 | Status: SHIPPED | OUTPATIENT
Start: 2024-02-12 | End: 2024-02-27

## 2024-02-12 NOTE — TELEPHONE ENCOUNTER
Medication:   Requested Prescriptions     Pending Prescriptions Disp Refills    ALPRAZolam (XANAX) 0.5 MG tablet 30 tablet 0     Sig: TAKE 1 TABLET BY MOUTH EVERY NIGHT AS NEEDED FOR SLEEP OR ANXIETY      Last Filled:  1/15/24    Patient Phone Number: [phone] (home)     Last appt: 12/28/2023   Next appt: 4/1/2024    Last OARRS:       4/24/2019     5:06 PM   RX Monitoring   Attestation The Prescription Monitoring Report for this patient was reviewed today.   Chronic Pain > 50 MEDD Obtained or confirmened \"Consent of Opioid Use\" on file.     PDMP Monitoring:    Last PDMP Cooper as Reviewed ([place]):  Review User Review Instant Review Result   FERNANDO DUTTA 12/28/2023  8:40 AM Reviewed PDMP [1]     Preferred Pharmacy:   [facility]ORE #[id number] [place], [place] - [address] [facility] -  [phone] - F [phone] [address] [facility] [address]  Phone: [phone] Fax: [phone]

## 2024-02-15 DIAGNOSIS — I10 PRIMARY HYPERTENSION: ICD-10-CM

## 2024-02-15 RX ORDER — CARVEDILOL 6.25 MG/1
TABLET ORAL
Qty: 180 TABLET | Refills: 0 | Status: SHIPPED | OUTPATIENT
Start: 2024-02-15

## 2024-02-19 RX ORDER — RAMIPRIL 10 MG/1
CAPSULE ORAL
Qty: 90 CAPSULE | Refills: 0 | Status: SHIPPED | OUTPATIENT
Start: 2024-02-19

## 2024-02-19 RX ORDER — FUROSEMIDE 80 MG
TABLET ORAL
Qty: 90 TABLET | Refills: 0 | Status: SHIPPED | OUTPATIENT
Start: 2024-02-19

## 2024-02-22 ENCOUNTER — TELEPHONE (OUTPATIENT)
Dept: FAMILY MEDICINE CLINIC | Age: 69
End: 2024-02-22

## 2024-02-22 NOTE — TELEPHONE ENCOUNTER
FYI - Patient called and she got a skin tear on her right forearm throwing trash away on 2/4/24. She stated she is not getting blisters around the area. I attempted to schedule her an appointment but there was nothing available. Suggested she go to the ER but she did not want to go around that many sick people. Stated she would try to go to Urgent Care tomorrow.

## 2024-03-05 ENCOUNTER — OFFICE VISIT (OUTPATIENT)
Dept: FAMILY MEDICINE CLINIC | Age: 69
End: 2024-03-05
Payer: COMMERCIAL

## 2024-03-05 VITALS — HEART RATE: 91 BPM | OXYGEN SATURATION: 96 % | TEMPERATURE: 97.8 F

## 2024-03-05 DIAGNOSIS — R11.0 NAUSEA: ICD-10-CM

## 2024-03-05 DIAGNOSIS — J34.89 RHINORRHEA: ICD-10-CM

## 2024-03-05 DIAGNOSIS — Z02.89 ENCOUNTER TO OBTAIN EXCUSE FROM WORK: Primary | ICD-10-CM

## 2024-03-05 PROCEDURE — 1123F ACP DISCUSS/DSCN MKR DOCD: CPT | Performed by: NURSE PRACTITIONER

## 2024-03-05 PROCEDURE — 99213 OFFICE O/P EST LOW 20 MIN: CPT | Performed by: NURSE PRACTITIONER

## 2024-03-05 RX ORDER — ONDANSETRON 4 MG/1
4 TABLET, FILM COATED ORAL 3 TIMES DAILY PRN
Qty: 30 TABLET | Refills: 0 | Status: SHIPPED | OUTPATIENT
Start: 2024-03-05

## 2024-03-05 RX ORDER — PREDNISONE 20 MG/1
TABLET ORAL
COMMUNITY
Start: 2024-02-23 | End: 2024-03-05 | Stop reason: ALTCHOICE

## 2024-03-05 RX ORDER — CEPHALEXIN 500 MG/1
500 CAPSULE ORAL 2 TIMES DAILY
COMMUNITY
Start: 2024-02-23 | End: 2024-03-05 | Stop reason: ALTCHOICE

## 2024-03-05 NOTE — PROGRESS NOTES
Randa Zepeda  : 1955  Encounter date: 3/5/2024    This dominick 68 y.o. female who presents with  Chief Complaint   Patient presents with    Congestion     Nasal drainage, scab on upper lip from drainage, nausea since 3/2  OTC keflex, mupirocin, prednisone  Home covid neg       History of present illness:    HPI PT is 68 year old female with concerns for ongoing sinus drainage, reports scabbing to upper lip, has been applying OTC topicals.  Pt being treated for other concerns with keflex and prednisone, treated for cellulitis, nausea following antibiotic.   Pt has topical bactroban.  Reports tested negative covid.    Current Outpatient Medications on File Prior to Visit   Medication Sig Dispense Refill    mupirocin (BACTROBAN) 2 % ointment APPLY TOPICALLY TO THE AFFECTED AREA THREE TIMES DAILY FOR 10 DAYS      furosemide (LASIX) 80 MG tablet TAKE 1 TABLET BY MOUTH EVERY DAY 90 tablet 0    ramipril (ALTACE) 10 MG capsule TAKE 1 CAPSULE BY MOUTH EVERY DAY 90 capsule 0    carvedilol (COREG) 6.25 MG tablet TAKE 1 TABLET BY MOUTH TWICE DAILY 180 tablet 0    pravastatin (PRAVACHOL) 20 MG tablet TAKE 1 TABLET BY MOUTH EVERY EVENING 90 tablet 0    gabapentin (NEURONTIN) 300 MG capsule TAKE 1 CAPSULE BY MOUTH THREE TIMES DAILY 270 capsule 0    nicotine (NICODERM CQ) 21 MG/24HR Place 1 patch onto the skin daily 42 patch 0    ibuprofen (ADVIL;MOTRIN) 200 MG tablet Take 1 tablet by mouth every 6 hours as needed for Pain      VITAMIN D PO Take by mouth      Multiple Vitamins-Minerals (THERAPEUTIC MULTIVITAMIN-MINERALS) tablet Take 1 tablet by mouth daily Centrum       No current facility-administered medications on file prior to visit.      Allergies   Allergen Reactions    Darvocet [Propoxyphene N-Acetaminophen] Hives    Lipitor [Atorvastatin]      Leg cramping    Propoxyphene Hives    Diclofenac Rash     Past Medical History:   Diagnosis Date    Anxiety     Fibromyalgia     Gout     Hyperlipidemia     Hypertension

## 2024-03-12 DIAGNOSIS — F41.9 ANXIETY: ICD-10-CM

## 2024-03-12 DIAGNOSIS — G47.09 OTHER INSOMNIA: ICD-10-CM

## 2024-03-12 RX ORDER — ALPRAZOLAM 0.5 MG/1
TABLET ORAL
Qty: 30 TABLET | Refills: 0 | Status: SHIPPED | OUTPATIENT
Start: 2024-03-12 | End: 2024-03-27

## 2024-03-12 NOTE — TELEPHONE ENCOUNTER
ALPRAZolam (XANAX) 0.5 MG tablet     [facility] #[id number] - [place], [place] - [address] - P [phone] - F [phone] [address] [facility] [address]  Phone: [phone]  Fax: [phone]

## 2024-03-13 DIAGNOSIS — I10 HYPERTENSION, UNSPECIFIED TYPE: ICD-10-CM

## 2024-03-13 RX ORDER — PRAVASTATIN SODIUM 20 MG
20 TABLET ORAL EVERY EVENING
Qty: 90 TABLET | Refills: 0 | Status: SHIPPED | OUTPATIENT
Start: 2024-03-13

## 2024-03-15 DIAGNOSIS — I10 PRIMARY HYPERTENSION: ICD-10-CM

## 2024-03-15 DIAGNOSIS — E78.2 MIXED HYPERLIPIDEMIA: ICD-10-CM

## 2024-03-15 LAB
ALBUMIN SERPL-MCNC: 4.1 G/DL (ref 3.4–5)
ALBUMIN/GLOB SERPL: 2.3 {RATIO} (ref 1.1–2.2)
ALP SERPL-CCNC: 48 U/L (ref 40–129)
ALT SERPL-CCNC: 9 U/L (ref 10–40)
ANION GAP SERPL CALCULATED.3IONS-SCNC: 10 MMOL/L (ref 3–16)
AST SERPL-CCNC: 17 U/L (ref 15–37)
BASOPHILS # BLD: 0 K/UL (ref 0–0.2)
BASOPHILS NFR BLD: 0.7 %
BILIRUB SERPL-MCNC: 0.6 MG/DL (ref 0–1)
BUN SERPL-MCNC: 13 MG/DL (ref 7–20)
CALCIUM SERPL-MCNC: 9.4 MG/DL (ref 8.3–10.6)
CHLORIDE SERPL-SCNC: 103 MMOL/L (ref 99–110)
CHOLEST SERPL-MCNC: 178 MG/DL (ref 0–199)
CO2 SERPL-SCNC: 30 MMOL/L (ref 21–32)
CREAT SERPL-MCNC: 0.6 MG/DL (ref 0.6–1.2)
CREAT UR-MCNC: 17 MG/DL (ref 28–259)
DEPRECATED RDW RBC AUTO: 14 % (ref 12.4–15.4)
EOSINOPHIL # BLD: 0.1 K/UL (ref 0–0.6)
EOSINOPHIL NFR BLD: 2 %
GFR SERPLBLD CREATININE-BSD FMLA CKD-EPI: >60 ML/MIN/{1.73_M2}
GLUCOSE P FAST SERPL-MCNC: 89 MG/DL (ref 70–99)
HCT VFR BLD AUTO: 39.9 % (ref 36–48)
HDLC SERPL-MCNC: 57 MG/DL (ref 40–60)
HGB BLD-MCNC: 13.7 G/DL (ref 12–16)
LDL CHOLESTEROL CALCULATED: 107 MG/DL
LYMPHOCYTES # BLD: 1.6 K/UL (ref 1–5.1)
LYMPHOCYTES NFR BLD: 24.4 %
MCH RBC QN AUTO: 32 PG (ref 26–34)
MCHC RBC AUTO-ENTMCNC: 34.4 G/DL (ref 31–36)
MCV RBC AUTO: 93.1 FL (ref 80–100)
MICROALBUMIN UR DL<=1MG/L-MCNC: <1.2 MG/DL
MICROALBUMIN/CREAT UR: ABNORMAL MG/G (ref 0–30)
MONOCYTES # BLD: 0.4 K/UL (ref 0–1.3)
MONOCYTES NFR BLD: 5.6 %
NEUTROPHILS # BLD: 4.3 K/UL (ref 1.7–7.7)
NEUTROPHILS NFR BLD: 67.3 %
PLATELET # BLD AUTO: 178 K/UL (ref 135–450)
PMV BLD AUTO: 8.5 FL (ref 5–10.5)
POTASSIUM SERPL-SCNC: 4.7 MMOL/L (ref 3.5–5.1)
PROT SERPL-MCNC: 5.9 G/DL (ref 6.4–8.2)
RBC # BLD AUTO: 4.28 M/UL (ref 4–5.2)
SODIUM SERPL-SCNC: 143 MMOL/L (ref 136–145)
TRIGL SERPL-MCNC: 72 MG/DL (ref 0–150)
VLDLC SERPL CALC-MCNC: 14 MG/DL
WBC # BLD AUTO: 6.3 K/UL (ref 4–11)

## 2024-03-29 ASSESSMENT — PATIENT HEALTH QUESTIONNAIRE - PHQ9
SUM OF ALL RESPONSES TO PHQ QUESTIONS 1-9: 0
2. FEELING DOWN, DEPRESSED OR HOPELESS: NOT AT ALL
SUM OF ALL RESPONSES TO PHQ QUESTIONS 1-9: 0
2. FEELING DOWN, DEPRESSED OR HOPELESS: NOT AT ALL
SUM OF ALL RESPONSES TO PHQ9 QUESTIONS 1 & 2: 0
SUM OF ALL RESPONSES TO PHQ9 QUESTIONS 1 & 2: 0
SUM OF ALL RESPONSES TO PHQ QUESTIONS 1-9: 0
1. LITTLE INTEREST OR PLEASURE IN DOING THINGS: NOT AT ALL
1. LITTLE INTEREST OR PLEASURE IN DOING THINGS: NOT AT ALL
SUM OF ALL RESPONSES TO PHQ QUESTIONS 1-9: 0

## 2024-04-01 ENCOUNTER — OFFICE VISIT (OUTPATIENT)
Dept: FAMILY MEDICINE CLINIC | Age: 69
End: 2024-04-01
Payer: COMMERCIAL

## 2024-04-01 VITALS
WEIGHT: 147 LBS | SYSTOLIC BLOOD PRESSURE: 130 MMHG | TEMPERATURE: 97.8 F | OXYGEN SATURATION: 97 % | BODY MASS INDEX: 22.35 KG/M2 | HEART RATE: 86 BPM | DIASTOLIC BLOOD PRESSURE: 74 MMHG

## 2024-04-01 DIAGNOSIS — E78.2 MIXED HYPERLIPIDEMIA: ICD-10-CM

## 2024-04-01 DIAGNOSIS — F41.9 ANXIETY: Primary | ICD-10-CM

## 2024-04-01 DIAGNOSIS — I10 PRIMARY HYPERTENSION: ICD-10-CM

## 2024-04-01 PROCEDURE — 99214 OFFICE O/P EST MOD 30 MIN: CPT | Performed by: NURSE PRACTITIONER

## 2024-04-01 PROCEDURE — 3078F DIAST BP <80 MM HG: CPT | Performed by: NURSE PRACTITIONER

## 2024-04-01 PROCEDURE — 1123F ACP DISCUSS/DSCN MKR DOCD: CPT | Performed by: NURSE PRACTITIONER

## 2024-04-01 PROCEDURE — 3075F SYST BP GE 130 - 139MM HG: CPT | Performed by: NURSE PRACTITIONER

## 2024-04-01 SDOH — ECONOMIC STABILITY: FOOD INSECURITY: WITHIN THE PAST 12 MONTHS, YOU WORRIED THAT YOUR FOOD WOULD RUN OUT BEFORE YOU GOT MONEY TO BUY MORE.: NEVER TRUE

## 2024-04-01 SDOH — ECONOMIC STABILITY: FOOD INSECURITY: WITHIN THE PAST 12 MONTHS, THE FOOD YOU BOUGHT JUST DIDN'T LAST AND YOU DIDN'T HAVE MONEY TO GET MORE.: NEVER TRUE

## 2024-04-01 SDOH — ECONOMIC STABILITY: INCOME INSECURITY: HOW HARD IS IT FOR YOU TO PAY FOR THE VERY BASICS LIKE FOOD, HOUSING, MEDICAL CARE, AND HEATING?: NOT HARD AT ALL

## 2024-04-01 NOTE — PROGRESS NOTES
well-developed.   Cardiovascular:      Rate and Rhythm: Normal rate and regular rhythm.      Pulses: Normal pulses.      Heart sounds: Normal heart sounds. No murmur heard.  Pulmonary:      Effort: Pulmonary effort is normal.      Breath sounds: Normal breath sounds.   Musculoskeletal:      Right lower leg: No edema.      Left lower leg: No edema.   Skin:     General: Skin is warm and dry.   Neurological:      Mental Status: She is alert and oriented to person, place, and time.   Psychiatric:         Mood and Affect: Mood normal.         Behavior: Behavior normal.         Thought Content: Thought content normal.         Judgment: Judgment normal.         Assessment/Plan    1. Anxiety  OARRS reviewed  controlled    2. Mixed hyperlipidemia  controlled    3. Primary hypertension  controlled      Return in about 3 months (around 7/1/2024) for medication re-check.    This dictation was generated by voice recognition computer software.  Although all attempts are made to edit the dictation for accuracy, there may be errors in the transcription that are not intended.

## 2024-04-11 DIAGNOSIS — G47.09 OTHER INSOMNIA: ICD-10-CM

## 2024-04-11 DIAGNOSIS — F41.9 ANXIETY: ICD-10-CM

## 2024-04-11 RX ORDER — ALPRAZOLAM 0.5 MG/1
TABLET ORAL
Qty: 30 TABLET | Refills: 0 | Status: SHIPPED | OUTPATIENT
Start: 2024-04-11 | End: 2024-04-26

## 2024-04-11 NOTE — TELEPHONE ENCOUNTER
ALPRAZolam (XANAX) 0.5 MG tablet     [facility] #[id number] - [place], [place] - [address] - P [phone] - F [phone] [address] [facility] [facility] [address]  Phone: [phone]  Fax: [phone]

## 2024-04-11 NOTE — TELEPHONE ENCOUNTER
Medication:   Requested Prescriptions     Pending Prescriptions Disp Refills    ALPRAZolam (XANAX) 0.5 MG tablet 30 tablet 0     Sig: TAKE 1 TABLET BY MOUTH EVERY NIGHT AS NEEDED FOR SLEEP OR ANXIETY      Last Filled:  3/12/2024     Patient Phone Number: [phone] (home)     Last appt: 4/1/2024   Next appt: 7/3/2024    Last OARRS:       4/24/2019     5:06 PM   RX Monitoring   Attestation The Prescription Monitoring Report for this patient was reviewed today.   Chronic Pain > 50 MEDD Obtained or confirmened \"Consent of Opioid Use\" on file.     PDMP Monitoring:    Last PDMP Cooper as Reviewed ([place]):  Review User Review Instant Review Result   FERNANDO DUTTA 4/1/2024  8:12 AM Reviewed PDMP [1]     Preferred Pharmacy:   [facility] #[id number] [place], [place] - [address] [facility] -  [phone] - F [phone] [address] [facility] [address]  Phone: [phone] Fax: [phone]

## 2024-04-19 ENCOUNTER — TELEPHONE (OUTPATIENT)
Dept: CASE MANAGEMENT | Age: 69
End: 2024-04-19

## 2024-05-10 DIAGNOSIS — I10 PRIMARY HYPERTENSION: ICD-10-CM

## 2024-05-13 RX ORDER — CARVEDILOL 6.25 MG/1
TABLET ORAL
Qty: 180 TABLET | Refills: 0 | Status: SHIPPED | OUTPATIENT
Start: 2024-05-13

## 2024-05-13 RX ORDER — RAMIPRIL 10 MG/1
CAPSULE ORAL
Qty: 90 CAPSULE | Refills: 0 | Status: SHIPPED | OUTPATIENT
Start: 2024-05-13

## 2024-05-13 RX ORDER — FUROSEMIDE 80 MG
TABLET ORAL
Qty: 90 TABLET | Refills: 0 | Status: SHIPPED | OUTPATIENT
Start: 2024-05-13

## 2024-05-17 DIAGNOSIS — G47.09 OTHER INSOMNIA: ICD-10-CM

## 2024-05-17 DIAGNOSIS — F41.9 ANXIETY: ICD-10-CM

## 2024-05-17 RX ORDER — ALPRAZOLAM 0.5 MG/1
TABLET ORAL
Qty: 30 TABLET | Refills: 0 | Status: SHIPPED | OUTPATIENT
Start: 2024-05-17 | End: 2024-06-01

## 2024-05-17 NOTE — TELEPHONE ENCOUNTER
Medication:   Requested Prescriptions     Pending Prescriptions Disp Refills    ALPRAZolam (XANAX) 0.5 MG tablet 30 tablet 0     Sig: TAKE 1 TABLET BY MOUTH EVERY NIGHT AS NEEDED FOR SLEEP OR ANXIETY      Last Filled:      Patient Phone Number: [phone] (home)     Last appt: 4/1/2024   Next appt: 7/3/2024    Last OARRS:       4/24/2019     5:06 PM   RX Monitoring   Attestation The Prescription Monitoring Report for this patient was reviewed today.   Chronic Pain > 50 MEDD Obtained or confirmened \"Consent of Opioid Use\" on file.     PDMP Monitoring:    Last PDMP Cooper as Reviewed ([place]):  Review User Review Instant Review Result   FERNANDO DUTTA 4/1/2024  8:12 AM Reviewed PDMP [1]     Preferred Pharmacy:   [facility]ORE #[id number] [place], [place] - [address] [facility] -  [phone] - F [phone] [address] [facility] [address]  Phone: [phone] Fax: [phone]

## 2024-05-17 NOTE — TELEPHONE ENCOUNTER
ALPRAZolam (XANAX) 0.5 MG tablet       [facility]ORE #[id number] - [place], [place] - [address] - P [phone] - F [phone] [address] [facility] [address]  Phone: [phone]  Fax: [phone]

## 2024-05-31 DIAGNOSIS — M13.0 POLYARTHROPATHY, MULTIPLE SITES: ICD-10-CM

## 2024-06-03 RX ORDER — GABAPENTIN 300 MG/1
CAPSULE ORAL
Qty: 270 CAPSULE | Refills: 0 | Status: SHIPPED | OUTPATIENT
Start: 2024-06-03 | End: 2024-07-03

## 2024-06-10 ENCOUNTER — TELEPHONE (OUTPATIENT)
Dept: CASE MANAGEMENT | Age: 69
End: 2024-06-10

## 2024-06-10 DIAGNOSIS — F17.200 SMOKER: Primary | ICD-10-CM

## 2024-06-10 NOTE — TELEPHONE ENCOUNTER
Nicki Johnson Dr., APRN - NP,    Patient was due for their annual lung screen on 5/3/24.  For your convenience, I have pended the order for the scan.  If you do not agree with the need for the test, please cancel the order and let me know.      Patient will be mailed reminder letter to schedule.      Thank you,     Mady Beavers  Lung Navigator  [facility]  [phone]    Future Appointments   Date Time Provider Department Center   7/3/2024  8:00 AM Nicki Hutson APRN - NP [facility] [place] [facility]       Last PCP Appt: 4/1/24     Lung Screen Criteria  Age 50-80  Current smoker or quit within the last 15 years  Has => 20 pack year history.

## 2024-06-28 ENCOUNTER — HOSPITAL ENCOUNTER (OUTPATIENT)
Dept: CT IMAGING | Age: 69
Discharge: HOME | End: 2024-06-28
Payer: COMMERCIAL

## 2024-06-28 DIAGNOSIS — F17.200 SMOKER: ICD-10-CM

## 2024-06-28 DIAGNOSIS — G47.09 OTHER INSOMNIA: ICD-10-CM

## 2024-06-28 DIAGNOSIS — F41.9 ANXIETY: ICD-10-CM

## 2024-06-28 PROCEDURE — 71271 CT THORAX LUNG CANCER SCR C-: CPT

## 2024-06-28 RX ORDER — ALPRAZOLAM 0.5 MG/1
TABLET ORAL
Qty: 30 TABLET | Refills: 0 | Status: SHIPPED | OUTPATIENT
Start: 2024-06-28 | End: 2024-07-13

## 2024-06-28 NOTE — TELEPHONE ENCOUNTER
Pcp ANA CRISTINA CUMMINS-NP out of office     ALPRAZolam (XANAX) 0.5 MG tablet     [facility] #[id number] - [place], [place] - [address] - P [phone] - F [phone] [address] [facility] [address]  Phone: [phone]  Fax: [phone]

## 2024-06-28 NOTE — TELEPHONE ENCOUNTER
Medication:   Requested Prescriptions     Pending Prescriptions Disp Refills    ALPRAZolam (XANAX) 0.5 MG tablet 30 tablet 0     Sig: TAKE 1 TABLET BY MOUTH EVERY NIGHT AS NEEDED FOR SLEEP OR ANXIETY      Last Filled:  5/17/2024    Patient Phone Number: [phone] (home)     Last appt: 4/1/2024   Next appt: 7/3/2024    Last OARRS:       4/24/2019     5:06 PM   RX Monitoring   Attestation The Prescription Monitoring Report for this patient was reviewed today.   Chronic Pain > 50 MEDD Obtained or confirmened \"Consent of Opioid Use\" on file.     PDMP Monitoring:    Last PDMP Cooper as Reviewed ([place]):  Review User Review Instant Review Result   VICKIE CHAWLA 5/17/2024  9:40 AM Reviewed PDMP [1]     Preferred Pharmacy:   [facility] DR[facility] STORE #[id number] [place], [place] - [address] [facility] -  [phone] - F [phone] [address] [facility] [address]  Phone: [phone] Fax: [phone]

## 2024-07-03 ENCOUNTER — OFFICE VISIT (OUTPATIENT)
Dept: FAMILY MEDICINE CLINIC | Age: 69
End: 2024-07-03
Payer: COMMERCIAL

## 2024-07-03 VITALS — BODY MASS INDEX: 22.66 KG/M2 | HEART RATE: 70 BPM | WEIGHT: 149 LBS | OXYGEN SATURATION: 95 % | TEMPERATURE: 97.9 F

## 2024-07-03 DIAGNOSIS — F41.9 ANXIETY: Primary | ICD-10-CM

## 2024-07-03 DIAGNOSIS — M13.0 POLYARTHROPATHY, MULTIPLE SITES: ICD-10-CM

## 2024-07-03 DIAGNOSIS — Z72.0 TOBACCO ABUSE: ICD-10-CM

## 2024-07-03 DIAGNOSIS — E78.2 MIXED HYPERLIPIDEMIA: ICD-10-CM

## 2024-07-03 PROCEDURE — 1123F ACP DISCUSS/DSCN MKR DOCD: CPT | Performed by: NURSE PRACTITIONER

## 2024-07-03 PROCEDURE — 99214 OFFICE O/P EST MOD 30 MIN: CPT | Performed by: NURSE PRACTITIONER

## 2024-07-03 RX ORDER — PRAVASTATIN SODIUM 20 MG
20 TABLET ORAL EVERY EVENING
Qty: 90 TABLET | Refills: 1 | Status: SHIPPED | OUTPATIENT
Start: 2024-07-03

## 2024-07-03 RX ORDER — NICOTINE 21 MG/24HR
1 PATCH, TRANSDERMAL 24 HOURS TRANSDERMAL DAILY
Qty: 42 PATCH | Refills: 0 | Status: SHIPPED | OUTPATIENT
Start: 2024-07-03 | End: 2024-08-14

## 2024-07-03 RX ORDER — MELOXICAM 15 MG/1
15 TABLET ORAL DAILY PRN
Qty: 90 TABLET | Refills: 1 | Status: SHIPPED | OUTPATIENT
Start: 2024-07-03

## 2024-07-03 NOTE — PROGRESS NOTES
Randa Zepeda  : 1955  Encounter date: 7/3/2024    This dominick 68 y.o. female who presents with  Chief Complaint   Patient presents with    Medication Check    Anxiety       History of present illness:    HPI PT is 68 year old female for follow on anxiety, well controlled.  Reports uncontrolled arthritis, knees and lower back.   Pt has had R knee replacement with mild relief, reports pain radiates down R calf.  Pt with significant cervical to lumbar degeneration.  PT continues smoking 1 ppd, previously prescribed nicotine patches, has not picked up.  Recent CT unchanged, small pulmonary nodules and mild emphysema.    Current Outpatient Medications on File Prior to Visit   Medication Sig Dispense Refill    ALPRAZolam (XANAX) 0.5 MG tablet TAKE 1 TABLET BY MOUTH EVERY NIGHT AS NEEDED FOR SLEEP OR ANXIETY 30 tablet 0    gabapentin (NEURONTIN) 300 MG capsule TAKE 1 CAPSULE BY MOUTH THREE TIMES DAILY 270 capsule 0    carvedilol (COREG) 6.25 MG tablet TAKE 1 TABLET BY MOUTH TWICE DAILY 180 tablet 0    ramipril (ALTACE) 10 MG capsule TAKE 1 CAPSULE BY MOUTH EVERY DAY 90 capsule 0    furosemide (LASIX) 80 MG tablet TAKE 1 TABLET BY MOUTH EVERY DAY 90 tablet 0    VITAMIN D PO Take by mouth      Multiple Vitamins-Minerals (THERAPEUTIC MULTIVITAMIN-MINERALS) tablet Take 1 tablet by mouth daily Centrum       No current facility-administered medications on file prior to visit.      Allergies   Allergen Reactions    Darvocet [Propoxyphene N-Acetaminophen] Hives    Lipitor [Atorvastatin]      Leg cramping    Propoxyphene Hives    Diclofenac Rash     Past Medical History:   Diagnosis Date    Anxiety     Fibromyalgia     Gout     Hyperlipidemia     Hypertension     Osteoarthritis       Past Surgical History:   Procedure Laterality Date    APPENDECTOMY  2019    HC INJECTION PROCEDURE FOR SACROILIAC JOINT Right 2020    RIGHT SACROILIAC JOINT INJECTION AND RIGHT GREATER TROCHANTERIC BURSA INJECTION WITH FLUOROSCOPY

## 2024-07-31 DIAGNOSIS — F41.9 ANXIETY: ICD-10-CM

## 2024-07-31 DIAGNOSIS — G47.09 OTHER INSOMNIA: ICD-10-CM

## 2024-07-31 RX ORDER — ALPRAZOLAM 0.5 MG/1
TABLET ORAL
Qty: 30 TABLET | Refills: 0 | Status: SHIPPED | OUTPATIENT
Start: 2024-07-31 | End: 2024-08-15

## 2024-07-31 NOTE — TELEPHONE ENCOUNTER
Medication:   Requested Prescriptions     Pending Prescriptions Disp Refills    ALPRAZolam (XANAX) 0.5 MG tablet 30 tablet 0     Sig: TAKE 1 TABLET BY MOUTH EVERY NIGHT AS NEEDED FOR SLEEP OR ANXIETY      Last Filled:  6/28/2024    Patient Phone Number: [phone] (home)     Last appt: 7/3/2024   Next appt: 10/8/2024    Last OARRS:       4/24/2019     5:06 PM   RX Monitoring   Attestation The Prescription Monitoring Report for this patient was reviewed today.   Chronic Pain > 50 MEDD Obtained or confirmened \"Consent of Opioid Use\" on file.     PDMP Monitoring:    Last PDMP [place] Reviewed ([place]):  Review User Review Instant Review Result   FERNANDO DUTTA 7/3/2024  8:03 AM Reviewed PDMP [1]     Preferred Pharmacy:   [facility] #[id number] [place], [place] - [address] [facility] -  [phone] - F [phone] [address] [facility] [address]  Phone: [phone] Fax: [phone]

## 2024-07-31 NOTE — TELEPHONE ENCOUNTER
ALPRAZolam (XANAX) 0.5 MG tablet [           [facility] #[id number] - [place], [place] - [address] - P [phone] - F [phone]           meloxicam (MOBIC) 15 MG tablet      Experiencing nausea due to medicine/ doesn't want to take it anymore--hasn't took it since Sunday      Pt contact:[phone]

## 2024-08-08 DIAGNOSIS — I10 PRIMARY HYPERTENSION: ICD-10-CM

## 2024-08-08 RX ORDER — FUROSEMIDE 80 MG
TABLET ORAL
Qty: 90 TABLET | Refills: 0 | Status: SHIPPED | OUTPATIENT
Start: 2024-08-08

## 2024-08-08 RX ORDER — CARVEDILOL 6.25 MG/1
TABLET ORAL
Qty: 180 TABLET | Refills: 0 | Status: SHIPPED | OUTPATIENT
Start: 2024-08-08

## 2024-08-08 RX ORDER — RAMIPRIL 10 MG/1
CAPSULE ORAL
Qty: 90 CAPSULE | Refills: 0 | Status: SHIPPED | OUTPATIENT
Start: 2024-08-08

## 2024-08-08 NOTE — TELEPHONE ENCOUNTER
Medication:   Requested Prescriptions     Pending Prescriptions Disp Refills    carvedilol (COREG) 6.25 MG tablet [Pharmacy Med Name: CARVEDILOL 6.25MG TABLETS] 180 tablet 0     Sig: TAKE 1 TABLET BY MOUTH TWICE DAILY    ramipril (ALTACE) 10 MG capsule [Pharmacy Med Name: RAMIPRIL 10MG CAPSULES] 90 capsule 0     Sig: TAKE 1 CAPSULE BY MOUTH EVERY DAY    furosemide (LASIX) 80 MG tablet [Pharmacy Med Name: FUROSEMIDE 80MG TABLETS] 90 tablet 0     Sig: TAKE 1 TABLET BY MOUTH EVERY DAY       Patient Phone Number: [phone] (home)     Last appt: 7/3/2024   Next appt: 10/8/2024    Last OARRS:       4/24/2019     5:06 PM   RX Monitoring   Attestation The Prescription Monitoring Report for this patient was reviewed today.   Chronic Pain > 50 MEDD Obtained or confirmened \"Consent of Opioid Use\" on file.     PDMP Monitoring:    Last PDMP [place] Reviewed ([place]):  Review User Review Instant Review Result   FERNANDO DUTTA 7/3/2024  8:03 AM Reviewed PDMP [1]     Preferred Pharmacy:   [id number] - [place], [place] - [address] [facility] -  [phone] - F [phone]  [address] [facility] [address]  Phone: [phone] Fax: [phone]

## 2024-08-27 DIAGNOSIS — F41.9 ANXIETY: ICD-10-CM

## 2024-08-27 DIAGNOSIS — M13.0 POLYARTHROPATHY, MULTIPLE SITES: ICD-10-CM

## 2024-08-27 DIAGNOSIS — G47.09 OTHER INSOMNIA: ICD-10-CM

## 2024-08-27 RX ORDER — ALPRAZOLAM 0.5 MG
TABLET ORAL
Qty: 30 TABLET | Refills: 0 | Status: SHIPPED | OUTPATIENT
Start: 2024-08-27 | End: 2024-09-11

## 2024-08-27 RX ORDER — GABAPENTIN 300 MG/1
300 CAPSULE ORAL 3 TIMES DAILY
Qty: 270 CAPSULE | Refills: 0 | Status: SHIPPED | OUTPATIENT
Start: 2024-08-27 | End: 2024-11-25

## 2024-08-27 NOTE — TELEPHONE ENCOUNTER
Medication:   Requested Prescriptions     Pending Prescriptions Disp Refills    ALPRAZolam (XANAX) 0.5 MG tablet 30 tablet 0     Sig: TAKE 1 TABLET BY MOUTH EVERY NIGHT AS NEEDED FOR SLEEP OR ANXIETY    gabapentin (NEURONTIN) 300 MG capsule 270 capsule 0     Sig: Take 1 capsule by mouth 3 times daily for 30 days.      Last Filled:  7/31    Patient Phone Number: [phone] (home)     Last appt: 7/3/2024   Next appt: 10/8/2024    Last OARRS:       4/24/2019     5:06 PM   RX Monitoring   Attestation The Prescription Monitoring Report for this patient was reviewed today.   Chronic Pain > 50 MEDD Obtained or confirmened \"Consent of Opioid Use\" on file.     PDMP Monitoring:    Last PDMP [place] Reviewed ([place]):  Review User Review Instant Review Result   FERNANDO DUTTA 7/3/2024  8:03 AM Reviewed PDMP [1]     Preferred Pharmacy:   [facility] #[id number] - [place], [place] - [address] [facility] [phone] - F [phone]  [address] [facility] [facility] [address]  Phone: [phone] Fax: [phone]

## 2024-09-30 DIAGNOSIS — F41.9 ANXIETY: ICD-10-CM

## 2024-09-30 DIAGNOSIS — G47.09 OTHER INSOMNIA: ICD-10-CM

## 2024-09-30 RX ORDER — ALPRAZOLAM 0.5 MG
TABLET ORAL
Qty: 30 TABLET | Refills: 0 | Status: SHIPPED | OUTPATIENT
Start: 2024-09-30 | End: 2024-10-15

## 2024-09-30 NOTE — TELEPHONE ENCOUNTER
ALPRAZolam (XANAX) 0.5 MG tablet [[id number]]  [facility] #[id number] - [place], [place] - [address] - P [phone] - F [phone]  [address] [facility] [address]  Phone: [phone]  Fax: [phone]

## 2024-10-09 ENCOUNTER — OFFICE VISIT (OUTPATIENT)
Dept: FAMILY MEDICINE CLINIC | Age: 69
End: 2024-10-09
Payer: COMMERCIAL

## 2024-10-09 VITALS
OXYGEN SATURATION: 95 % | HEART RATE: 98 BPM | WEIGHT: 146 LBS | TEMPERATURE: 97.5 F | HEIGHT: 68 IN | SYSTOLIC BLOOD PRESSURE: 132 MMHG | BODY MASS INDEX: 22.13 KG/M2 | DIASTOLIC BLOOD PRESSURE: 84 MMHG

## 2024-10-09 DIAGNOSIS — F41.9 ANXIETY: ICD-10-CM

## 2024-10-09 DIAGNOSIS — Z23 NEED FOR INFLUENZA VACCINATION: ICD-10-CM

## 2024-10-09 DIAGNOSIS — E78.2 MIXED HYPERLIPIDEMIA: ICD-10-CM

## 2024-10-09 DIAGNOSIS — Z00.00 ENCOUNTER FOR SUBSEQUENT ANNUAL WELLNESS VISIT (AWV) IN MEDICARE PATIENT: Primary | ICD-10-CM

## 2024-10-09 DIAGNOSIS — I10 PRIMARY HYPERTENSION: ICD-10-CM

## 2024-10-09 PROCEDURE — 3075F SYST BP GE 130 - 139MM HG: CPT | Performed by: NURSE PRACTITIONER

## 2024-10-09 PROCEDURE — 3079F DIAST BP 80-89 MM HG: CPT | Performed by: NURSE PRACTITIONER

## 2024-10-09 PROCEDURE — G0439 PPPS, SUBSEQ VISIT: HCPCS | Performed by: NURSE PRACTITIONER

## 2024-10-09 PROCEDURE — 90653 IIV ADJUVANT VACCINE IM: CPT | Performed by: NURSE PRACTITIONER

## 2024-10-09 PROCEDURE — G0008 ADMIN INFLUENZA VIRUS VAC: HCPCS | Performed by: NURSE PRACTITIONER

## 2024-10-09 PROCEDURE — 1123F ACP DISCUSS/DSCN MKR DOCD: CPT | Performed by: NURSE PRACTITIONER

## 2024-10-09 ASSESSMENT — PATIENT HEALTH QUESTIONNAIRE - PHQ9
SUM OF ALL RESPONSES TO PHQ QUESTIONS 1-9: 0
SUM OF ALL RESPONSES TO PHQ9 QUESTIONS 1 & 2: 0
SUM OF ALL RESPONSES TO PHQ QUESTIONS 1-9: 0
1. LITTLE INTEREST OR PLEASURE IN DOING THINGS: NOT AT ALL
SUM OF ALL RESPONSES TO PHQ QUESTIONS 1-9: 0
2. FEELING DOWN, DEPRESSED OR HOPELESS: NOT AT ALL
SUM OF ALL RESPONSES TO PHQ QUESTIONS 1-9: 0

## 2024-10-09 NOTE — PROGRESS NOTES
(147 lb)       GENERAL:Alert and oriented x 4 NAD, no acute distress, well hydrated, well developed.  LUNG:clear to auscultation bilaterally with normal respiratory effort  CV: Normal heart sounds, regular rate and rhythm without murmurs  EXTREMETY: no edema, normal pedal pulses bilaterally    Was the timed get up and go unsteady or longer than 20 seconds: No    Vision Screen for Initial Exam: no    EKG for Initial Exam at 65 (): no    AAA U/S screen for men 65-75 who smoked (): not applicable    Assessment/Plan:    Randa was seen today for medicare awv.    Diagnoses and all orders for this visit:    Encounter for subsequent annual wellness visit (AWV) in Medicare patient  Advised routine dental and vision  Increased exercise, healthy diet   Advised living will  Advised RSV and covid booster  Colonoscopy 2026  CT lung screen 06/2025  Mammogram 2025    Anxiety  OARRS reviewed  Controlled    Primary hypertension  Controlled    Mixed hyperlipidemia  Controlled    Need for influenza vaccination  -     Influenza, FLUAD Trivalent, (age 65 y+), IM, Preservative Free, 0.5mL  Administered          Return in about 3 months (around 1/9/2025) for medication re-check.

## 2024-10-30 DIAGNOSIS — F41.9 ANXIETY: ICD-10-CM

## 2024-10-30 DIAGNOSIS — G47.09 OTHER INSOMNIA: ICD-10-CM

## 2024-10-30 RX ORDER — ALPRAZOLAM 0.5 MG
TABLET ORAL
Qty: 30 TABLET | Refills: 0 | Status: SHIPPED | OUTPATIENT
Start: 2024-10-30 | End: 2024-11-14

## 2024-10-30 NOTE — TELEPHONE ENCOUNTER
ALPRAZolam (XANAX) 0.5 MG tablet [[id number]]  [facility] #[id number] - [place], [place] - [address] - P [phone] - F [phone]  [address] [facility] [address]  Phone: [phone]  Fax: [phone]

## 2024-11-04 DIAGNOSIS — I10 PRIMARY HYPERTENSION: ICD-10-CM

## 2024-11-04 RX ORDER — CARVEDILOL 6.25 MG/1
TABLET ORAL
Qty: 180 TABLET | Refills: 3 | Status: SHIPPED | OUTPATIENT
Start: 2024-11-04

## 2024-11-04 RX ORDER — RAMIPRIL 10 MG/1
CAPSULE ORAL
Qty: 90 CAPSULE | Refills: 3 | Status: SHIPPED | OUTPATIENT
Start: 2024-11-04

## 2024-11-04 RX ORDER — FUROSEMIDE 80 MG/1
TABLET ORAL
Qty: 90 TABLET | Refills: 3 | Status: SHIPPED | OUTPATIENT
Start: 2024-11-04

## 2024-11-27 ENCOUNTER — OFFICE VISIT (OUTPATIENT)
Dept: FAMILY MEDICINE CLINIC | Age: 69
End: 2024-11-27

## 2024-11-27 VITALS — HEART RATE: 84 BPM | TEMPERATURE: 98.2 F | OXYGEN SATURATION: 95 %

## 2024-11-27 DIAGNOSIS — J01.90 ACUTE BACTERIAL SINUSITIS: Primary | ICD-10-CM

## 2024-11-27 DIAGNOSIS — J44.0 COPD WITH ACUTE BRONCHITIS (HCC): ICD-10-CM

## 2024-11-27 DIAGNOSIS — J20.9 COPD WITH ACUTE BRONCHITIS (HCC): ICD-10-CM

## 2024-11-27 DIAGNOSIS — B96.89 ACUTE BACTERIAL SINUSITIS: Primary | ICD-10-CM

## 2024-11-27 RX ORDER — DOXYCYCLINE HYCLATE 100 MG
100 TABLET ORAL 2 TIMES DAILY
Qty: 20 TABLET | Refills: 0 | Status: SHIPPED | OUTPATIENT
Start: 2024-11-27 | End: 2024-12-07

## 2024-11-27 RX ORDER — PREDNISONE 20 MG/1
TABLET ORAL
Qty: 18 TABLET | Refills: 0 | Status: SHIPPED | OUTPATIENT
Start: 2024-11-27

## 2024-11-27 RX ORDER — ALBUTEROL SULFATE 90 UG/1
2 INHALANT RESPIRATORY (INHALATION) 4 TIMES DAILY PRN
Qty: 18 G | Refills: 0 | Status: SHIPPED | OUTPATIENT
Start: 2024-11-27

## 2024-11-27 ASSESSMENT — ENCOUNTER SYMPTOMS
VOMITING: 0
COUGH: 1
NAUSEA: 0
SHORTNESS OF BREATH: 0
DIARRHEA: 0

## 2024-11-27 NOTE — PROGRESS NOTES
sounds.   Pulmonary:      Effort: Pulmonary effort is normal. No respiratory distress.      Breath sounds: Wheezing (throughout) present.      Comments: Congested cough  Musculoskeletal:      Cervical back: Normal range of motion and neck supple.   Lymphadenopathy:      Cervical: Cervical adenopathy (<1cm, nontender) present.   Neurological:      Mental Status: She is alert and oriented to person, place, and time.   Psychiatric:         Thought Content: Thought content normal.         Judgment: Judgment normal.       Assessment/Plan    1. Acute bacterial sinusitis  Initiate doxycycline/prednisone.  -Saline rinse/Saline spray 3-4 times daily  -Salt water gargles 3-4 times daily  -May use Ibuprofen or Tylenol for discomfort  -May add in a 24 hour antihistamine to assist with excess sinus drainage/post-nasal drip  -If symptoms do not resolve or worsen in the next 48-72 hours follow-up with office  - doxycycline hyclate (VIBRA-TABS) 100 MG tablet; Take 1 tablet by mouth 2 times daily for 10 days  Dispense: 20 tablet; Refill: 0  - predniSONE (DELTASONE) 20 MG tablet; Take 3 tabs for 3 days, 2 tabs for 3 days and 1 tab for 3 days  Dispense: 18 tablet; Refill: 0    2. COPD with acute bronchitis (HCC)  Add albuterol.  Discussed red flags and when to follow up/report to ED.   - doxycycline hyclate (VIBRA-TABS) 100 MG tablet; Take 1 tablet by mouth 2 times daily for 10 days  Dispense: 20 tablet; Refill: 0  - predniSONE (DELTASONE) 20 MG tablet; Take 3 tabs for 3 days, 2 tabs for 3 days and 1 tab for 3 days  Dispense: 18 tablet; Refill: 0  - albuterol sulfate HFA (VENTOLIN HFA) 108 (90 Base) MCG/ACT inhaler; Inhale 2 puffs into the lungs 4 times daily as needed for Wheezing  Dispense: 18 g; Refill: 0       Randa Zepeda was counseled regarding symptoms of current diagnosis, course and complications of disease if inadequately treated.  Discussed side effects of medications, diagnosis, treatment options, and prognosis along

## 2024-11-30 DIAGNOSIS — G47.09 OTHER INSOMNIA: ICD-10-CM

## 2024-11-30 DIAGNOSIS — F41.9 ANXIETY: ICD-10-CM

## 2024-12-02 DIAGNOSIS — F41.9 ANXIETY: ICD-10-CM

## 2024-12-02 DIAGNOSIS — G47.09 OTHER INSOMNIA: ICD-10-CM

## 2024-12-02 RX ORDER — ALPRAZOLAM 0.5 MG
TABLET ORAL
Qty: 30 TABLET | Refills: 0 | Status: SHIPPED | OUTPATIENT
Start: 2024-12-02 | End: 2025-01-02

## 2024-12-02 NOTE — TELEPHONE ENCOUNTER
Medication:   Requested Prescriptions     Pending Prescriptions Disp Refills    ALPRAZolam (XANAX) 0.5 MG tablet [Pharmacy Med Name: ALPRAZOLAM 0.5MG TABLETS] 30 tablet      Sig: TAKE 1 TABLET BY MOUTH EVERY NIGHT AS NEEDED FOR SLEEP OR ANXIETY      Last Filled:  10/30/2024    Patient Phone Number: [phone] (home)     Last appt: 11/27/2024   Next appt: 1/9/2025    Last OARRS:       4/24/2019     5:06 PM   RX Monitoring   Attestation The Prescription Monitoring Report for this patient was reviewed today.   Chronic Pain > 50 MEDD Obtained or confirmened \"Consent of Opioid Use\" on file.     PDMP Monitoring:    Last PDMP [place] Reviewed ([place]):  Review User Review Instant Review Result   FERNANDO DUTTA 10/9/2024  8:44 AM Reviewed PDMP [1]     Preferred Pharmacy:   [facility] #[id number] [place], [place] - [address] -  [phone] - F [phone]  [address] [facility] [address]  Phone: [phone] Fax: [phone]

## 2024-12-05 DIAGNOSIS — M13.0 POLYARTHROPATHY, MULTIPLE SITES: ICD-10-CM

## 2024-12-05 RX ORDER — GABAPENTIN 300 MG/1
300 CAPSULE ORAL 3 TIMES DAILY
Qty: 270 CAPSULE | Refills: 0 | Status: SHIPPED | OUTPATIENT
Start: 2024-12-05 | End: 2025-03-05

## 2024-12-12 ENCOUNTER — OFFICE VISIT (OUTPATIENT)
Dept: FAMILY MEDICINE CLINIC | Age: 69
End: 2024-12-12
Payer: COMMERCIAL

## 2024-12-12 VITALS
SYSTOLIC BLOOD PRESSURE: 144 MMHG | BODY MASS INDEX: 24.78 KG/M2 | HEART RATE: 102 BPM | OXYGEN SATURATION: 98 % | DIASTOLIC BLOOD PRESSURE: 94 MMHG | WEIGHT: 163 LBS | TEMPERATURE: 97.8 F

## 2024-12-12 DIAGNOSIS — Z09 HOSPITAL DISCHARGE FOLLOW-UP: ICD-10-CM

## 2024-12-12 DIAGNOSIS — S72.002D CLOSED FRACTURE OF LEFT HIP WITH ROUTINE HEALING, SUBSEQUENT ENCOUNTER: Primary | ICD-10-CM

## 2024-12-12 DIAGNOSIS — Z72.0 TOBACCO ABUSE: ICD-10-CM

## 2024-12-12 PROCEDURE — 1123F ACP DISCUSS/DSCN MKR DOCD: CPT | Performed by: NURSE PRACTITIONER

## 2024-12-12 PROCEDURE — 3077F SYST BP >= 140 MM HG: CPT | Performed by: NURSE PRACTITIONER

## 2024-12-12 PROCEDURE — 99214 OFFICE O/P EST MOD 30 MIN: CPT | Performed by: NURSE PRACTITIONER

## 2024-12-12 PROCEDURE — 3080F DIAST BP >= 90 MM HG: CPT | Performed by: NURSE PRACTITIONER

## 2024-12-12 PROCEDURE — 1159F MED LIST DOCD IN RCRD: CPT | Performed by: NURSE PRACTITIONER

## 2024-12-12 RX ORDER — CEFUROXIME AXETIL 500 MG/1
500 TABLET ORAL 2 TIMES DAILY
COMMUNITY
Start: 2024-12-10 | End: 2024-12-15

## 2024-12-12 RX ORDER — BUPROPION HYDROCHLORIDE 150 MG/1
150 TABLET, FILM COATED, EXTENDED RELEASE ORAL 2 TIMES DAILY
Qty: 60 TABLET | Refills: 5 | Status: SHIPPED | OUTPATIENT
Start: 2024-12-12

## 2024-12-12 RX ORDER — ENOXAPARIN SODIUM 100 MG/ML
40 INJECTION SUBCUTANEOUS DAILY
COMMUNITY
Start: 2024-12-10 | End: 2024-12-31

## 2024-12-12 NOTE — PROGRESS NOTES
Randa Zepeda  : 1955  Encounter date: 2024    This dominick 69 y.o. female who presents with  Chief Complaint   Patient presents with    Follow-Up from Hospital     -12/10 [facility] Closed left hip fracture, partial left hip replacement       History of present illness:    HPI PT is 69 year old female for hospital follow up from -12/10 at [facility] for fall that occurred at home, tripped over kitchen rug.  PT arrived by EMT with pain in L hip.    Concerns regarding respiratory status, lower oxygen sats.  PT assessed by hospitalist and pulmonology    Imaging  Xray pelvis/femur- Displaced left femoral neck fracture. No dislocation. Moderate degenerative joint space narrowing in the left knee.     ECHO  Left ventricle size is normal. Mildly increased wall thickness. Basal septal thickening. No wall motion abnormalities noted. Normal systolic function with a visually estimated EF of 60 - 65%. Grade I diastolic dysfunction.     Chest xray-  Mild streaky change in the right lower lung laterally.  Prior granulomatous disease.     Dr. Karthik Fay, orthopedics, performed Left hip melissa-arthroplastic.  Healing well, bandage in place, will be starting home PT this afternoon.  Pt ambulating with walker, denies pain, limited ROM    PT reports wanting to stop smoking d/t respiratory concerns, would like to trial zyban, currently has nicotine patch in place.     PT reports being discharged home with 21 days Lovenox injections to prevent clot and ABX for respiratory.  PT reports being sent home with oxygen and supplies, has not used, would like to return.  Current Outpatient Medications on File Prior to Visit   Medication Sig Dispense Refill    cefUROXime (CEFTIN) 500 MG tablet Take 1 tablet by mouth 2 times daily      enoxaparin (LOVENOX) 40 MG/0.4ML Inject 0.4 mLs into the skin daily      gabapentin (NEURONTIN) 300 MG capsule Take 1 capsule by mouth 3 times daily for 90 days. 270 capsule 0    ALPRAZolam

## 2024-12-13 ENCOUNTER — TELEPHONE (OUTPATIENT)
Dept: FAMILY MEDICINE CLINIC | Age: 69
End: 2024-12-13

## 2024-12-13 NOTE — TELEPHONE ENCOUNTER
Provider out of office/made aware that pcp will be back on Monday     Fax: ale @ [facility]  [phone]    Called in requesting a new letter sent to [facility]/ Needs to say discontinue w/o testing --- insurance not respecting current letter due to wording.       Insurance is wanting to do a overnight sleep study to ensure if she needs oxygen.

## 2024-12-19 NOTE — TELEPHONE ENCOUNTER
Fax: ale @ [facility] [phone]     Please send order to discontinue/ once done please advise pt

## 2024-12-30 DIAGNOSIS — F41.9 ANXIETY: ICD-10-CM

## 2024-12-30 DIAGNOSIS — G47.09 OTHER INSOMNIA: ICD-10-CM

## 2024-12-30 RX ORDER — ALPRAZOLAM 0.5 MG
TABLET ORAL
Qty: 30 TABLET | Refills: 0 | Status: SHIPPED | OUTPATIENT
Start: 2024-12-30 | End: 2025-01-02 | Stop reason: SDUPTHER

## 2024-12-30 NOTE — TELEPHONE ENCOUNTER
Medication:   Requested Prescriptions     Pending Prescriptions Disp Refills    ALPRAZolam (XANAX) 0.5 MG tablet 30 tablet 0     Sig: TAKE 1 TABLET BY MOUTH EVERY NIGHT AS NEEDED FOR SLEEP OR ANXIETY      Last Filled:  12/2    Patient Phone Number: [phone] (home)     Last appt: 12/12/2024   Next appt: 1/9/2025    Last OARRS:       4/24/2019     5:06 PM   RX Monitoring   Attestation The Prescription Monitoring Report for this patient was reviewed today.   Chronic Pain > 50 MEDD Obtained or confirmened \"Consent of Opioid Use\" on file.     PDMP Monitoring:    Last PDMP [place]ed ([place]):  Review User Review Instant Review Result   FERNANDO DUTTA 12/12/2024  2:00 PM Reviewed PDMP [1]     Preferred Pharmacy:   [facility] #[id number] [place], [place] - [address] [facility] -  [phone] - F [phone]  [address] [facility] [address]  Phone: [phone] Fax: [phone]

## 2025-01-02 DIAGNOSIS — G47.09 OTHER INSOMNIA: ICD-10-CM

## 2025-01-02 DIAGNOSIS — F41.9 ANXIETY: ICD-10-CM

## 2025-01-02 RX ORDER — ALPRAZOLAM 0.5 MG
TABLET ORAL
Qty: 30 TABLET | Refills: 0 | Status: SHIPPED | OUTPATIENT
Start: 2025-01-02 | End: 2025-02-02

## 2025-01-02 NOTE — TELEPHONE ENCOUNTER
ALPRAZolam (XANAX) 0.5 MG tablet     [facility] #[id number] - [place], [place] - [address] - P [phone] - F [phone] [address] [address]  Phone: [phone]  Fax: [phone]

## 2025-01-02 NOTE — TELEPHONE ENCOUNTER
Medication:   Requested Prescriptions     Pending Prescriptions Disp Refills    ALPRAZolam (XANAX) 0.5 MG tablet 30 tablet 0     Sig: TAKE 1 TABLET BY MOUTH EVERY NIGHT AS NEEDED FOR SLEEP OR ANXIETY      Last Filled:  12/30/2024, pharmacy did not receive request    Patient Phone Number: [phone] (home)     Last appt: 12/12/2024   Next appt: 1/7/2025    Last OARRS:       4/24/2019     5:06 PM   RX Monitoring   Attestation The Prescription Monitoring Report for this patient was reviewed today.   Chronic Pain > 50 MEDD Obtained or confirmened \"Consent of Opioid Use\" on file.     PDMP Monitoring:    Last PDMP [place] Reviewed ([place]):  Review User Review Instant Review Result   FERNANDO DUTTA 12/12/2024  2:00 PM Reviewed PDMP [1]     Preferred Pharmacy:   [facility] #[id number] [place], [place] - [address] -  [phone] - F [phone] [address] [facility] [address]  Phone: [phone] Fax: [phone]

## 2025-01-03 DIAGNOSIS — F41.9 ANXIETY: ICD-10-CM

## 2025-01-03 DIAGNOSIS — G47.09 OTHER INSOMNIA: ICD-10-CM

## 2025-01-03 RX ORDER — ALPRAZOLAM 0.5 MG
TABLET ORAL
Qty: 30 TABLET | OUTPATIENT
Start: 2025-01-03

## 2025-01-04 ASSESSMENT — PATIENT HEALTH QUESTIONNAIRE - PHQ9
1. LITTLE INTEREST OR PLEASURE IN DOING THINGS: NOT AT ALL
1. LITTLE INTEREST OR PLEASURE IN DOING THINGS: NOT AT ALL
2. FEELING DOWN, DEPRESSED OR HOPELESS: NOT AT ALL
SUM OF ALL RESPONSES TO PHQ QUESTIONS 1-9: 0
2. FEELING DOWN, DEPRESSED OR HOPELESS: NOT AT ALL
SUM OF ALL RESPONSES TO PHQ QUESTIONS 1-9: 0
SUM OF ALL RESPONSES TO PHQ QUESTIONS 1-9: 0
SUM OF ALL RESPONSES TO PHQ9 QUESTIONS 1 & 2: 0
SUM OF ALL RESPONSES TO PHQ9 QUESTIONS 1 & 2: 0
SUM OF ALL RESPONSES TO PHQ QUESTIONS 1-9: 0

## 2025-01-06 ENCOUNTER — TELEPHONE (OUTPATIENT)
Dept: ADMINISTRATIVE | Age: 70
End: 2025-01-06

## 2025-01-06 NOTE — TELEPHONE ENCOUNTER
Reconciled medication list provided post discharge from [facility]. See attached documentation.     Thank you.

## 2025-01-07 ENCOUNTER — OFFICE VISIT (OUTPATIENT)
Dept: FAMILY MEDICINE CLINIC | Age: 70
End: 2025-01-07
Payer: COMMERCIAL

## 2025-01-07 VITALS — HEART RATE: 76 BPM | OXYGEN SATURATION: 97 % | TEMPERATURE: 97.8 F

## 2025-01-07 DIAGNOSIS — T14.8XXA HEMATOMA: ICD-10-CM

## 2025-01-07 DIAGNOSIS — Z02.89 ENCOUNTER FOR COMPLETION OF FORM WITH PATIENT: ICD-10-CM

## 2025-01-07 DIAGNOSIS — F41.9 ANXIETY: Primary | ICD-10-CM

## 2025-01-07 PROCEDURE — 1123F ACP DISCUSS/DSCN MKR DOCD: CPT | Performed by: NURSE PRACTITIONER

## 2025-01-07 PROCEDURE — 1159F MED LIST DOCD IN RCRD: CPT | Performed by: NURSE PRACTITIONER

## 2025-01-07 PROCEDURE — 99214 OFFICE O/P EST MOD 30 MIN: CPT | Performed by: NURSE PRACTITIONER

## 2025-01-07 NOTE — PROGRESS NOTES
Randa Zepeda  : 1955  Encounter date: 2025    This dominick 69 y.o. female who presents with  Chief Complaint   Patient presents with    Medication Check    Anxiety    Mass     On left hip after surgery, going to see ortho  and hopes to have it aspirated       History of present illness:    HPI Pt is 69 year old female for medication check, taking xanax for anxiety, well controlled.  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  Pt with developed hematoma L hip at surgical site.  Reports wearing compression stockings and applying ice without relief.  Has upcoming appt with surgeon on 25 for possible drainage.    Current Outpatient Medications on File Prior to Visit   Medication Sig Dispense Refill    ALPRAZolam (XANAX) 0.5 MG tablet TAKE 1 TABLET BY MOUTH EVERY NIGHT AS NEEDED FOR SLEEP OR ANXIETY 30 tablet 0    buPROPion (ZYBAN) 150 MG extended release tablet Take 1 tablet by mouth 2 times daily 60 tablet 5    gabapentin (NEURONTIN) 300 MG capsule Take 1 capsule by mouth 3 times daily for 90 days. 270 capsule 0    predniSONE (DELTASONE) 20 MG tablet Take 3 tabs for 3 days, 2 tabs for 3 days and 1 tab for 3 days 18 tablet 0    albuterol sulfate HFA (VENTOLIN HFA) 108 (90 Base) MCG/ACT inhaler Inhale 2 puffs into the lungs 4 times daily as needed for Wheezing 18 g 0    carvedilol (COREG) 6.25 MG tablet TAKE 1 TABLET BY MOUTH TWICE DAILY 180 tablet 3    ramipril (ALTACE) 10 MG capsule TAKE 1 CAPSULE BY MOUTH EVERY DAY 90 capsule 3    furosemide (LASIX) 80 MG tablet TAKE 1 TABLET BY MOUTH EVERY DAY 90 tablet 3    pravastatin (PRAVACHOL) 20 MG tablet Take 1 tablet by mouth every evening 90 tablet 1    VITAMIN D PO Take by mouth      Multiple Vitamins-Minerals (THERAPEUTIC MULTIVITAMIN-MINERALS) tablet Take 1 tablet by mouth daily Centrum      nicotine (NICODERM CQ) 21 MG/24HR Place 1 patch onto the skin daily

## 2025-01-09 NOTE — TELEPHONE ENCOUNTER
Spoke with Pt at [facility] on 1/7/25. Pt states that she has already returned the oxygen and no longer needs paperwork to be filled out.

## 2025-01-14 DIAGNOSIS — E78.2 MIXED HYPERLIPIDEMIA: ICD-10-CM

## 2025-01-15 RX ORDER — PRAVASTATIN SODIUM 20 MG
20 TABLET ORAL EVERY EVENING
Qty: 90 TABLET | Refills: 0 | Status: SHIPPED | OUTPATIENT
Start: 2025-01-15

## 2025-01-30 DIAGNOSIS — F41.9 ANXIETY: ICD-10-CM

## 2025-01-30 DIAGNOSIS — G47.09 OTHER INSOMNIA: ICD-10-CM

## 2025-01-30 DIAGNOSIS — M13.0 POLYARTHROPATHY, MULTIPLE SITES: ICD-10-CM

## 2025-01-30 NOTE — TELEPHONE ENCOUNTER
Medication:   Requested Prescriptions     Pending Prescriptions Disp Refills    ALPRAZolam (XANAX) 0.5 MG tablet [Pharmacy Med Name: ALPRAZOLAM 0.5MG TABLETS] 30 tablet 0     Sig: TAKE 1 TABLET BY MOUTH EVERY NIGHT AS NEEDED FOR SLEEP OR ANXIETY    gabapentin (NEURONTIN) 300 MG capsule [Pharmacy Med Name: GABAPENTIN 300MG CAPSULES] 270 capsule 0     Sig: Take 1 capsule by mouth 3 times daily for 90 days.      Last Filled:  Xanax 1/2/2025    Gabapentin 12/5/2024    Patient Phone Number: [phone] (home)     Last appt: 1/7/2025   Next appt: 3/12/2025    Last OARRS:       4/24/2019     5:06 PM   RX Monitoring   Attestation The Prescription Monitoring Report for this patient was reviewed today.   Chronic Pain > 50 MEDD Obtained or confirmened \"Consent of Opioid Use\" on file.     PDMP Monitoring:    Last PDMP [place] Reviewed ([place]):  Review User Review Instant Review Result   FERNANDO DUTTA 12/12/2024  2:00 PM Reviewed PDMP [1]     Preferred Pharmacy:   [facility][id number] - [place], [place] - [address] [facility] -  [phone] - F [phone] [address] [facility] [address]  Phone: [phone] Fax: [phone]

## 2025-01-31 RX ORDER — ALPRAZOLAM 0.5 MG
TABLET ORAL
Qty: 30 TABLET | Refills: 0 | Status: SHIPPED | OUTPATIENT
Start: 2025-01-31 | End: 2025-03-01

## 2025-01-31 RX ORDER — GABAPENTIN 300 MG/1
300 CAPSULE ORAL 3 TIMES DAILY
Qty: 270 CAPSULE | Refills: 0 | Status: SHIPPED | OUTPATIENT
Start: 2025-01-31 | End: 2025-05-01

## 2025-03-03 DIAGNOSIS — G47.09 OTHER INSOMNIA: ICD-10-CM

## 2025-03-03 DIAGNOSIS — F41.9 ANXIETY: ICD-10-CM

## 2025-03-03 RX ORDER — ALPRAZOLAM 0.5 MG
TABLET ORAL
Qty: 30 TABLET | Refills: 0 | Status: SHIPPED | OUTPATIENT
Start: 2025-03-03 | End: 2025-04-01

## 2025-03-03 NOTE — TELEPHONE ENCOUNTER
ALPRAZolam (XANAX) 0.5 MG tablet            [facility] #[id number] - [place], [place] - [address] - P [phone] - F [phone] [address] [facility] [address]  Phone: [phone]  Fax: [phone]

## 2025-03-03 NOTE — TELEPHONE ENCOUNTER
Medication:   Requested Prescriptions     Pending Prescriptions Disp Refills    ALPRAZolam (XANAX) 0.5 MG tablet 30 tablet 0     Sig: TAKE 1 TABLET BY MOUTH EVERY NIGHT AS NEEDED FOR SLEEP OR ANXIETY      Last Filled:  1/31/2025    Patient Phone Number: [phone] (home)     Last appt: 1/7/2025   Next appt: 3/12/2025    Last OARRS:       4/24/2019     5:06 PM   RX Monitoring   Attestation The Prescription Monitoring Report for this patient was reviewed today.   Chronic Pain > 50 MEDD Obtained or confirmened \"Consent of Opioid Use\" on file.     PDMP Monitoring:    Last PDMP [place] Reviewed ([place]):  Review User Review Instant Review Result   VICKIE CHAWLA 1/31/2025  8:49 AM Reviewed PDMP [1]     Preferred Pharmacy:   [facility] #[id number] [place], [place] - [address] [facility] -  [phone] - F [phone] [address] [facility] [address]  Phone: [phone] Fax: [phone]

## 2025-03-12 ENCOUNTER — OFFICE VISIT (OUTPATIENT)
Dept: FAMILY MEDICINE CLINIC | Age: 70
End: 2025-03-12
Payer: COMMERCIAL

## 2025-03-12 VITALS
HEART RATE: 92 BPM | SYSTOLIC BLOOD PRESSURE: 142 MMHG | BODY MASS INDEX: 25.09 KG/M2 | TEMPERATURE: 98.7 F | OXYGEN SATURATION: 97 % | DIASTOLIC BLOOD PRESSURE: 88 MMHG | WEIGHT: 165 LBS

## 2025-03-12 DIAGNOSIS — S72.002D CLOSED FRACTURE OF LEFT HIP WITH ROUTINE HEALING, SUBSEQUENT ENCOUNTER: ICD-10-CM

## 2025-03-12 DIAGNOSIS — F41.9 ANXIETY: Primary | ICD-10-CM

## 2025-03-12 DIAGNOSIS — M17.0 BILATERAL PRIMARY OSTEOARTHRITIS OF KNEE: ICD-10-CM

## 2025-03-12 DIAGNOSIS — Z96.651 HISTORY OF TOTAL RIGHT KNEE REPLACEMENT: ICD-10-CM

## 2025-03-12 PROCEDURE — 3077F SYST BP >= 140 MM HG: CPT | Performed by: NURSE PRACTITIONER

## 2025-03-12 PROCEDURE — 1123F ACP DISCUSS/DSCN MKR DOCD: CPT | Performed by: NURSE PRACTITIONER

## 2025-03-12 PROCEDURE — 1159F MED LIST DOCD IN RCRD: CPT | Performed by: NURSE PRACTITIONER

## 2025-03-12 PROCEDURE — 3079F DIAST BP 80-89 MM HG: CPT | Performed by: NURSE PRACTITIONER

## 2025-03-12 PROCEDURE — 99214 OFFICE O/P EST MOD 30 MIN: CPT | Performed by: NURSE PRACTITIONER

## 2025-03-12 RX ORDER — KETOROLAC TROMETHAMINE 10 MG/1
10 TABLET, FILM COATED ORAL EVERY 6 HOURS PRN
Qty: 40 TABLET | Refills: 0 | Status: SHIPPED | OUTPATIENT
Start: 2025-03-12 | End: 2025-03-22

## 2025-03-12 SDOH — ECONOMIC STABILITY: FOOD INSECURITY: WITHIN THE PAST 12 MONTHS, YOU WORRIED THAT YOUR FOOD WOULD RUN OUT BEFORE YOU GOT MONEY TO BUY MORE.: NEVER TRUE

## 2025-03-12 SDOH — ECONOMIC STABILITY: FOOD INSECURITY: WITHIN THE PAST 12 MONTHS, THE FOOD YOU BOUGHT JUST DIDN'T LAST AND YOU DIDN'T HAVE MONEY TO GET MORE.: NEVER TRUE

## 2025-03-12 NOTE — PROGRESS NOTES
Randa Zepeda  : 1955  Encounter date: 3/12/2025    This dominick 69 y.o. female who presents with  Chief Complaint   Patient presents with    Follow-up     Broke left hip nov/dec, has noticed more physical pain in other areas. Has been taking tylenol and ibuprofen.        History of present illness:    HPI Pt is 69 year old female for medication check, taking xanax for anxiety, well controlled.  Not due for refill.  Pt with history of R knee total .  Previous L hip fracture , healing well, reports has now had significantly more R knee pain and bilateral LE numbness and shooting following injury.  Pt is no longer established with Dr. Bianchi, orthopedics.  Currently taking ibuprofen and gabapentin with little relief.    Current Outpatient Medications on File Prior to Visit   Medication Sig Dispense Refill    ALPRAZolam (XANAX) 0.5 MG tablet TAKE 1 TABLET BY MOUTH EVERY NIGHT AS NEEDED FOR SLEEP OR ANXIETY 30 tablet 0    gabapentin (NEURONTIN) 300 MG capsule Take 1 capsule by mouth 3 times daily for 90 days. 270 capsule 0    pravastatin (PRAVACHOL) 20 MG tablet TAKE 1 TABLET BY MOUTH EVERY EVENING 90 tablet 0    buPROPion (ZYBAN) 150 MG extended release tablet Take 1 tablet by mouth 2 times daily (Patient not taking: Reported on 3/12/2025) 60 tablet 5    albuterol sulfate HFA (VENTOLIN HFA) 108 (90 Base) MCG/ACT inhaler Inhale 2 puffs into the lungs 4 times daily as needed for Wheezing 18 g 0    carvedilol (COREG) 6.25 MG tablet TAKE 1 TABLET BY MOUTH TWICE DAILY 180 tablet 3    ramipril (ALTACE) 10 MG capsule TAKE 1 CAPSULE BY MOUTH EVERY DAY 90 capsule 3    furosemide (LASIX) 80 MG tablet TAKE 1 TABLET BY MOUTH EVERY DAY 90 tablet 3    VITAMIN D PO Take by mouth      Multiple Vitamins-Minerals (THERAPEUTIC MULTIVITAMIN-MINERALS) tablet Take 1 tablet by mouth daily Centrum       No current facility-administered medications on file prior to visit.      Allergies   Allergen Reactions    Oxycodone

## 2025-03-16 SDOH — HEALTH STABILITY: PHYSICAL HEALTH: ON AVERAGE, HOW MANY DAYS PER WEEK DO YOU ENGAGE IN MODERATE TO STRENUOUS EXERCISE (LIKE A BRISK WALK)?: 7 DAYS

## 2025-03-17 ENCOUNTER — OFFICE VISIT (OUTPATIENT)
Dept: ORTHOPEDIC SURGERY | Age: 70
End: 2025-03-17
Payer: COMMERCIAL

## 2025-03-17 VITALS — WEIGHT: 157.2 LBS | BODY MASS INDEX: 23.9 KG/M2

## 2025-03-17 DIAGNOSIS — Z96.659 PAIN IN KNEE REGION AFTER TOTAL KNEE REPLACEMENT, INITIAL ENCOUNTER: ICD-10-CM

## 2025-03-17 DIAGNOSIS — T84.84XA PAIN IN KNEE REGION AFTER TOTAL KNEE REPLACEMENT, INITIAL ENCOUNTER: ICD-10-CM

## 2025-03-17 DIAGNOSIS — Z98.890 HISTORY OF RIGHT KNEE SURGERY: Primary | ICD-10-CM

## 2025-03-17 PROCEDURE — 1123F ACP DISCUSS/DSCN MKR DOCD: CPT | Performed by: ORTHOPAEDIC SURGERY

## 2025-03-17 PROCEDURE — 99203 OFFICE O/P NEW LOW 30 MIN: CPT | Performed by: ORTHOPAEDIC SURGERY

## 2025-03-17 RX ORDER — METHYLPREDNISOLONE 4 MG/1
TABLET ORAL
Qty: 1 KIT | Refills: 0 | Status: SHIPPED | OUTPATIENT
Start: 2025-03-17 | End: 2025-03-23

## 2025-03-17 NOTE — PROGRESS NOTES
patient has periarticular tendinitis consistent with iliotibial band tendonitis  after total knee arthroplasty  .   We discussed the diagnosis and treatment options in detail with the patient.  Did steroid Dosepak, we will send her for physical therapy for the knee    I will see her back in 2 months if she continues to have discomfort we will look more closely the knee and back.

## 2025-04-01 DIAGNOSIS — F41.9 ANXIETY: ICD-10-CM

## 2025-04-01 DIAGNOSIS — G47.09 OTHER INSOMNIA: ICD-10-CM

## 2025-04-01 RX ORDER — ALPRAZOLAM 0.5 MG
TABLET ORAL
Qty: 30 TABLET | Refills: 0 | Status: SHIPPED | OUTPATIENT
Start: 2025-04-01 | End: 2025-04-30

## 2025-04-01 NOTE — TELEPHONE ENCOUNTER
Medication:   Requested Prescriptions     Pending Prescriptions Disp Refills    ALPRAZolam (XANAX) 0.5 MG tablet 30 tablet 0     Sig: TAKE 1 TABLET BY MOUTH EVERY NIGHT AS NEEDED FOR SLEEP OR ANXIETY      Last Filled:  3/3    Patient Phone Number: [phone] (home)     Last appt: 3/12/2025   Next appt: 6/16/2025    Last OARRS:       4/24/2019     5:06 PM   RX Monitoring   Attestation The Prescription Monitoring Report for this patient was reviewed today.   Chronic Pain > 50 MEDD Obtained or confirmened \"Consent of Opioid Use\" on file.     PDMP Monitoring:    Last PDMP [place] Reviewed ([place]):  Review User Review Instant Review Result   FERNANDO DUTTA 3/12/2025  9:16 AM Reviewed PDMP [1]     Preferred Pharmacy:   [facility] #[id number] [place], [place] - [address] [facility] -  [phone] - F [phone]  [address] [facility] [address]  Phone: [phone] Fax: [phone]

## 2025-04-01 NOTE — TELEPHONE ENCOUNTER
Pt called in requesting a refill on   ALPRAZolam (XANAX) 0.5 MG tablet to be sent to   [facility] #[id number] - [place], [place] - [address]

## 2025-04-27 DIAGNOSIS — E78.2 MIXED HYPERLIPIDEMIA: ICD-10-CM

## 2025-04-28 DIAGNOSIS — F41.9 ANXIETY: ICD-10-CM

## 2025-04-28 DIAGNOSIS — G47.09 OTHER INSOMNIA: ICD-10-CM

## 2025-04-28 RX ORDER — ALPRAZOLAM 0.5 MG
TABLET ORAL
Qty: 30 TABLET | Refills: 0 | Status: SHIPPED | OUTPATIENT
Start: 2025-04-28 | End: 2025-05-27

## 2025-04-28 RX ORDER — PRAVASTATIN SODIUM 20 MG
20 TABLET ORAL EVERY EVENING
Qty: 90 TABLET | Refills: 0 | Status: SHIPPED | OUTPATIENT
Start: 2025-04-28

## 2025-04-28 NOTE — TELEPHONE ENCOUNTER
Medication:   Requested Prescriptions     Pending Prescriptions Disp Refills    ALPRAZolam (XANAX) 0.5 MG tablet 30 tablet 0     Sig: TAKE 1 TABLET BY MOUTH EVERY NIGHT AS NEEDED FOR SLEEP OR ANXIETY      Last Filled:  4/1    Patient Phone Number: [phone] (home)     Last appt: 3/12/2025   Next appt: 6/16/2025    Last OARRS:       4/24/2019     5:06 PM   RX Monitoring   Attestation The Prescription Monitoring Report for this patient was reviewed today.   Chronic Pain > 50 MEDD Obtained or confirmened \"Consent of Opioid Use\" on file.     PDMP Monitoring:    Last PDMP [place] as Reviewed ([place]):  Review User Review Instant Review Result   FERNANDO DUTTA 3/12/2025  9:16 AM Reviewed PDMP [1]     Preferred Pharmacy:   [facility] #[id number] [place], [place] - [address] -  [phone] - F [phone]  [address] [address]  Phone: [phone] Fax: [phone]

## 2025-04-28 NOTE — TELEPHONE ENCOUNTER
Medication:   Requested Prescriptions     Pending Prescriptions Disp Refills    pravastatin (PRAVACHOL) 20 MG tablet [Pharmacy Med Name: PRAVASTATIN 20MG TABLETS] 90 tablet 0     Sig: TAKE 1 TABLET BY MOUTH EVERY EVENING          Patient Phone Number: [phone] (home)     Last appt: 3/12/2025   Next appt: 4/28/2025    Last OARRS:       4/24/2019     5:06 PM   RX Monitoring   Attestation The Prescription Monitoring Report for this patient was reviewed today.   Chronic Pain > 50 MEDD Obtained or confirmened \"Consent of Opioid Use\" on file.     PDMP Monitoring:    Last PDMP [place] Reviewed ([place]):  Review User Review Instant Review Result   FERNANDO DUTTA 3/12/2025  9:16 AM Reviewed PDMP [1]     Preferred Pharmacy:   [facility][id number] [place], [place] - [address] -  [phone] - F [phone] [address] [facility] [facility] [address]  Phone: [phone] Fax: [phone]

## 2025-04-28 NOTE — TELEPHONE ENCOUNTER
Pt called in requesting a refill on  ALPRAZolam (XANAX) 0.5 MG tablet  to be sent to  [facility] #[id number] - [place], [place] - [address]

## 2025-05-13 ENCOUNTER — OFFICE VISIT (OUTPATIENT)
Dept: FAMILY MEDICINE CLINIC | Age: 70
End: 2025-05-13
Payer: COMMERCIAL

## 2025-05-13 VITALS — WEIGHT: 150.6 LBS | BODY MASS INDEX: 22.9 KG/M2 | OXYGEN SATURATION: 95 % | TEMPERATURE: 97.9 F | HEART RATE: 112 BPM

## 2025-05-13 DIAGNOSIS — J20.9 COPD WITH ACUTE BRONCHITIS (HCC): ICD-10-CM

## 2025-05-13 DIAGNOSIS — J44.0 COPD WITH ACUTE BRONCHITIS (HCC): ICD-10-CM

## 2025-05-13 PROCEDURE — 1123F ACP DISCUSS/DSCN MKR DOCD: CPT | Performed by: FAMILY MEDICINE

## 2025-05-13 PROCEDURE — 99213 OFFICE O/P EST LOW 20 MIN: CPT | Performed by: FAMILY MEDICINE

## 2025-05-13 RX ORDER — ALBUTEROL SULFATE 90 UG/1
2 INHALANT RESPIRATORY (INHALATION) 4 TIMES DAILY PRN
Qty: 18 G | Refills: 0 | Status: SHIPPED | OUTPATIENT
Start: 2025-05-13

## 2025-05-13 RX ORDER — DOXYCYCLINE HYCLATE 100 MG
100 TABLET ORAL 2 TIMES DAILY
Qty: 14 TABLET | Refills: 0 | Status: SHIPPED | OUTPATIENT
Start: 2025-05-13 | End: 2025-05-20

## 2025-05-13 NOTE — PROGRESS NOTES
Chief Complaint   Patient presents with    Cough     Sx started end of last week, sniffling. Started after cut grass. Progressively getting worse. Now having some sob. Feels blah. Coughing - green yellow sputum. No fever. Sinus drainage. + scratchy throat, hoarse voice. Ears at baseline. Feels wheezy.     Drinking peppermint tea, more fluids, honey cough drops.     Smoker: smoker     Vitals:    05/13/25 1605   Pulse: (!) 112   Temp: 97.9 °F (36.6 °C)   TempSrc: Infrared   SpO2: 95%   Weight: 68.3 kg (150 lb 9.6 oz)     Wt Readings from Last 3 Encounters:   05/13/25 68.3 kg (150 lb 9.6 oz)   03/17/25 71.3 kg (157 lb 3.2 oz)   03/12/25 74.8 kg (165 lb)     Body mass index is 22.9 kg/m².    Alert and oriented x 4 NAD, affect appropriate and normal appearing weight, well hydrated, well developed.  Left TM nl, canal nl and pinna nl  Right TM nl, canal has alternate EAC but normal  OP mild erythema, no exudate, no swelling  Lung decreased BS with occ exp wheeze  CV RRR no M      ASSESSMENT AND PLAN:       Randa was seen today for cough.    Diagnoses and all orders for this visit:    COPD with acute bronchitis (HCC)  -     albuterol sulfate HFA (VENTOLIN HFA) 108 (90 Base) MCG/ACT inhaler; Inhale 2 puffs into the lungs 4 times daily as needed for Wheezing  -     doxycycline hyclate (VIBRA-TABS) 100 MG tablet; Take 1 tablet by mouth 2 times daily for 7 days    Continue symptomatic treatment with over the counter medications.  Call if symptoms not improving over the next week or worsening symptoms.

## 2025-05-19 ENCOUNTER — OFFICE VISIT (OUTPATIENT)
Dept: ORTHOPEDIC SURGERY | Age: 70
End: 2025-05-19
Payer: COMMERCIAL

## 2025-05-19 DIAGNOSIS — T84.84XA PAIN IN KNEE REGION AFTER TOTAL KNEE REPLACEMENT, INITIAL ENCOUNTER: Primary | ICD-10-CM

## 2025-05-19 DIAGNOSIS — Z96.659 PAIN IN KNEE REGION AFTER TOTAL KNEE REPLACEMENT, INITIAL ENCOUNTER: Primary | ICD-10-CM

## 2025-05-19 PROCEDURE — 99213 OFFICE O/P EST LOW 20 MIN: CPT | Performed by: ORTHOPAEDIC SURGERY

## 2025-05-19 PROCEDURE — 1159F MED LIST DOCD IN RCRD: CPT | Performed by: ORTHOPAEDIC SURGERY

## 2025-05-19 PROCEDURE — 1123F ACP DISCUSS/DSCN MKR DOCD: CPT | Performed by: ORTHOPAEDIC SURGERY

## 2025-05-19 NOTE — PROGRESS NOTES
Patient: Randa Zepeda  : 1955    MRN: [id number]    Date of Visit: 25    Attending Physician: Everton Fontenot MD    History of Present Illness  Ms.. Zepeda is a very pleasant 69 y.o. patient with a several month history of right knee pain.  She underwent a right knee arthroplasty several years ago reportedly did well last 2 months has noticed worsening anterior and lateral burning sensation however a sharp pain as well that extends down    Of note she had left hip fracture 3 months ago the right knee pain started after recovering and rehabbing with that hip.    She reports she is doing largely better, lateral hip soreness, occasional knee pain. She underwent R TKA several years ago.    PMH/PSH:  Past Medical History:   Diagnosis Date    Anxiety     Fibromyalgia     Gout     Hyperlipidemia     Hypertension     Osteoarthritis      Patient Active Problem List   Diagnosis    Osteoarthritis    Back pain    Hypertension    Fibromyalgia    Hypoglycemia    Hearing problem of both ears    Macular degeneration    Cervical disc disease    Tobacco dependence    Other insomnia    Anxiety    Pain medication agreement    Chronic, continuous use of opioids    Polyarthropathy, multiple sites    Other spondylosis, lumbar region    Mixed hyperlipidemia    Bilateral primary osteoarthritis of knee     Past Surgical History:   Procedure Laterality Date    APPENDECTOMY  2019    HC INJECTION PROCEDURE FOR SACROILIAC JOINT Right 2020    RIGHT SACROILIAC JOINT INJECTION AND RIGHT GREATER TROCHANTERIC BURSA INJECTION WITH FLUOROSCOPY (20478, 68335) performed by Starla Borges MD at [facility]    HYSTERECTOMY, VAGINAL      with ovaries    INNER EAR SURGERY Bilateral     many    KNEE ARTHROSCOPY Right 2009    KNEE ARTHROSCOPY Left     PAIN MANAGEMENT PROCEDURE Right 2020    RIGHT L4 AND L5 TRANSFORAMINAL EPIDURAL STEROID INJECTION WITH FLUOROSCOPY (72207, 27899) performed by Starla Borges MD at [facility]

## 2025-05-28 DIAGNOSIS — F41.9 ANXIETY: ICD-10-CM

## 2025-05-28 DIAGNOSIS — G47.09 OTHER INSOMNIA: ICD-10-CM

## 2025-05-28 RX ORDER — ALPRAZOLAM 0.5 MG
TABLET ORAL
Qty: 30 TABLET | Refills: 0 | Status: SHIPPED | OUTPATIENT
Start: 2025-05-28 | End: 2025-06-26

## 2025-05-28 NOTE — TELEPHONE ENCOUNTER
Medication:   Requested Prescriptions     Pending Prescriptions Disp Refills    ALPRAZolam (XANAX) 0.5 MG tablet 30 tablet 0     Sig: TAKE 1 TABLET BY MOUTH EVERY NIGHT AS NEEDED FOR SLEEP OR ANXIETY      Last Filled:  4/28    Patient Phone Number: [phone] (home)     Last appt: 5/13/2025   Next appt: 6/16/2025    Last OARRS:       4/24/2019     5:06 PM   RX Monitoring   Attestation The Prescription Monitoring Report for this patient was reviewed today.   Chronic Pain > 50 MEDD Obtained or confirmened \"Consent of Opioid Use\" on file.     PDMP Monitoring:    Last PDMP [place] as Reviewed ([place]):  Review User Review Instant Review Result   FERNANDO DUTTA 3/12/2025  9:16 AM Reviewed PDMP [1]     Preferred Pharmacy:   [facility] #[id number] [place], [place] - [address] -  [phone] - F [phone]  [address] [address] [facility] [address]  Phone: [phone] Fax: [phone]

## 2025-06-04 ENCOUNTER — TELEPHONE (OUTPATIENT)
Dept: CASE MANAGEMENT | Age: 70
End: 2025-06-04

## 2025-06-04 DIAGNOSIS — Z87.891 PERSONAL HISTORY OF TOBACCO USE, PRESENTING HAZARDS TO HEALTH: Primary | ICD-10-CM

## 2025-06-04 NOTE — TELEPHONE ENCOUNTER
Nicki Johnson Dr., APRN - NP,    This is a friendly reminder that your patient is due for their annual lung screen on 6/28/25.  For your convenience, I have pended the order for the scan.  If you do not agree with the need for the test, please cancel the order and let me know.      Patient will be mailed reminder letter to schedule.      Thank you,     Mady Beavers  Lung Navigator  [facility]  [phone]    Future Appointments   Date Time Provider Department Center   6/16/2025  9:15 AM [facility] Hutson [facility] - [facility]       Last PCP Appt: 5/2025     Lung Screen Criteria  Age 50-80  Current smoker or quit within the last 15 years  Has => 20 pack year history.

## 2025-06-05 DIAGNOSIS — M13.0 POLYARTHROPATHY, MULTIPLE SITES: ICD-10-CM

## 2025-06-05 RX ORDER — GABAPENTIN 300 MG/1
300 CAPSULE ORAL 3 TIMES DAILY
Qty: 270 CAPSULE | Refills: 0 | Status: SHIPPED | OUTPATIENT
Start: 2025-06-05 | End: 2025-09-03

## 2025-06-05 NOTE — TELEPHONE ENCOUNTER
Medication:   Requested Prescriptions     Pending Prescriptions Disp Refills    gabapentin (NEURONTIN) 300 MG capsule 270 capsule 0     Sig: Take 1 capsule by mouth 3 times daily for 90 days.      Last Filled:      Patient Phone Number: [phone] (home)     Last appt: 5/13/2025   Next appt: 6/16/2025    Last OARRS:       4/24/2019     5:06 PM   RX Monitoring   Attestation The Prescription Monitoring Report for this patient was reviewed today.   Chronic Pain > 50 MEDD Obtained or confirmened \"Consent of Opioid Use\" on file.     PDMP Monitoring:    Last PDMP [place] as Reviewed ([place]):  Review User Review Instant Review Result   FERNANDO DUTTA 3/12/2025  9:16 AM Reviewed PDMP [1]     Preferred Pharmacy:   [facility] #[id number] [place], [place] - [address] -  [phone] - F [phone] [address] [facility] [address]  Phone: [phone] Fax: [phone]

## 2025-06-16 ENCOUNTER — OFFICE VISIT (OUTPATIENT)
Dept: FAMILY MEDICINE CLINIC | Age: 70
End: 2025-06-16
Payer: COMMERCIAL

## 2025-06-16 VITALS
BODY MASS INDEX: 23.08 KG/M2 | SYSTOLIC BLOOD PRESSURE: 141 MMHG | TEMPERATURE: 98.1 F | OXYGEN SATURATION: 96 % | WEIGHT: 151.8 LBS | HEART RATE: 115 BPM | DIASTOLIC BLOOD PRESSURE: 90 MMHG

## 2025-06-16 DIAGNOSIS — F41.9 ANXIETY: Primary | ICD-10-CM

## 2025-06-16 DIAGNOSIS — J20.9 ACUTE BRONCHITIS, UNSPECIFIED ORGANISM: ICD-10-CM

## 2025-06-16 DIAGNOSIS — E78.2 MIXED HYPERLIPIDEMIA: Primary | ICD-10-CM

## 2025-06-16 DIAGNOSIS — I10 PRIMARY HYPERTENSION: ICD-10-CM

## 2025-06-16 DIAGNOSIS — I10 HYPERTENSION, UNSPECIFIED TYPE: ICD-10-CM

## 2025-06-16 DIAGNOSIS — G47.00 INSOMNIA, UNSPECIFIED TYPE: ICD-10-CM

## 2025-06-16 PROCEDURE — 1160F RVW MEDS BY RX/DR IN RCRD: CPT | Performed by: NURSE PRACTITIONER

## 2025-06-16 PROCEDURE — 1159F MED LIST DOCD IN RCRD: CPT | Performed by: NURSE PRACTITIONER

## 2025-06-16 PROCEDURE — 3080F DIAST BP >= 90 MM HG: CPT | Performed by: NURSE PRACTITIONER

## 2025-06-16 PROCEDURE — 1123F ACP DISCUSS/DSCN MKR DOCD: CPT | Performed by: NURSE PRACTITIONER

## 2025-06-16 PROCEDURE — 99214 OFFICE O/P EST MOD 30 MIN: CPT | Performed by: NURSE PRACTITIONER

## 2025-06-16 PROCEDURE — 3077F SYST BP >= 140 MM HG: CPT | Performed by: NURSE PRACTITIONER

## 2025-06-16 RX ORDER — ALPRAZOLAM 1 MG/1
1 TABLET ORAL NIGHTLY PRN
Qty: 30 TABLET | Refills: 0 | Status: SHIPPED | OUTPATIENT
Start: 2025-06-16 | End: 2025-07-16

## 2025-06-16 NOTE — PROGRESS NOTES
Randa Zepeda  : 1955  Encounter date: 2025    This dominick 69 y.o. female who presents with  Chief Complaint   Patient presents with    Medication Check     Med check, still trouble sleeping, has a lot of stress with family issues, still has wet cough for about a month.        History of present illness:    HPI Pt is 69 year old female for medication check, taking xanax 0.5 mg nightly for insomnia and anxiety, has been taking same dosage for decades.  Reports not working as well.  Pt has been under more stress with family concerns.  Recent URI, treated with doxycycline.  Reports continued productive cough and chest tightness, taking OTC delsym and hydrating.  Denies dyspnea.    Current Outpatient Medications on File Prior to Visit   Medication Sig Dispense Refill    gabapentin (NEURONTIN) 300 MG capsule Take 1 capsule by mouth 3 times daily for 90 days. 270 capsule 0    albuterol sulfate HFA (VENTOLIN HFA) 108 (90 Base) MCG/ACT inhaler Inhale 2 puffs into the lungs 4 times daily as needed for Wheezing 18 g 0    pravastatin (PRAVACHOL) 20 MG tablet TAKE 1 TABLET BY MOUTH EVERY EVENING 90 tablet 0    ketorolac (TORADOL) 10 MG tablet Take 1 tablet by mouth every 6 hours as needed for Pain 40 tablet 0    carvedilol (COREG) 6.25 MG tablet TAKE 1 TABLET BY MOUTH TWICE DAILY 180 tablet 3    ramipril (ALTACE) 10 MG capsule TAKE 1 CAPSULE BY MOUTH EVERY DAY 90 capsule 3    furosemide (LASIX) 80 MG tablet TAKE 1 TABLET BY MOUTH EVERY DAY 90 tablet 3    VITAMIN D PO Take by mouth      Multiple Vitamins-Minerals (THERAPEUTIC MULTIVITAMIN-MINERALS) tablet Take 1 tablet by mouth daily Centrum       No current facility-administered medications on file prior to visit.      Allergies   Allergen Reactions    Oxycodone Hallucinations    Darvocet [Propoxyphene N-Acetaminophen] Hives    Lipitor [Atorvastatin]      Leg cramping    Propoxyphene Hives    Wellbutrin [Bupropion]     Diclofenac Rash     Past Medical History:

## 2025-07-21 ENCOUNTER — HOSPITAL ENCOUNTER (OUTPATIENT)
Age: 70
Discharge: HOME | End: 2025-07-21
Payer: COMMERCIAL

## 2025-07-21 ENCOUNTER — HOSPITAL ENCOUNTER (OUTPATIENT)
Dept: CT IMAGING | Age: 70
Discharge: HOME | End: 2025-07-21
Payer: COMMERCIAL

## 2025-07-21 DIAGNOSIS — Z87.891 PERSONAL HISTORY OF TOBACCO USE, PRESENTING HAZARDS TO HEALTH: ICD-10-CM

## 2025-07-21 LAB
ALBUMIN SERPL-MCNC: 4.1 G/DL (ref 3.4–5)
ALBUMIN/GLOB SERPL: 1.9 {RATIO} (ref 1.1–2.2)
ALP SERPL-CCNC: 49 U/L (ref 40–129)
ALT SERPL-CCNC: 14 U/L (ref 10–40)
ANION GAP SERPL CALCULATED.3IONS-SCNC: 11 MMOL/L (ref 3–16)
AST SERPL-CCNC: 24 U/L (ref 15–37)
BASOPHILS # BLD: 0 K/UL (ref 0–0.2)
BASOPHILS NFR BLD: 0.5 %
BILIRUB SERPL-MCNC: 0.6 MG/DL (ref 0–1)
BUN SERPL-MCNC: 15 MG/DL (ref 7–20)
CALCIUM SERPL-MCNC: 9.7 MG/DL (ref 8.3–10.6)
CHLORIDE SERPL-SCNC: 105 MMOL/L (ref 99–110)
CHOLEST SERPL-MCNC: 163 MG/DL (ref 0–199)
CO2 SERPL-SCNC: 27 MMOL/L (ref 21–32)
CREAT SERPL-MCNC: 0.6 MG/DL (ref 0.6–1.2)
DEPRECATED RDW RBC AUTO: 15.2 % (ref 12.4–15.4)
EOSINOPHIL # BLD: 0.2 K/UL (ref 0–0.6)
EOSINOPHIL NFR BLD: 2.9 %
GFR SERPLBLD CREATININE-BSD FMLA CKD-EPI: >90 ML/MIN/{1.73_M2}
GLUCOSE SERPL-MCNC: 97 MG/DL (ref 70–99)
HCT VFR BLD AUTO: 43.8 % (ref 36–48)
HDLC SERPL-MCNC: 63 MG/DL (ref 40–60)
HGB BLD-MCNC: 14.6 G/DL (ref 12–16)
LDLC SERPL CALC-MCNC: 84 MG/DL
LYMPHOCYTES # BLD: 1.3 K/UL (ref 1–5.1)
LYMPHOCYTES NFR BLD: 23.8 %
MCH RBC QN AUTO: 30.7 PG (ref 26–34)
MCHC RBC AUTO-ENTMCNC: 33.3 G/DL (ref 31–36)
MCV RBC AUTO: 92 FL (ref 80–100)
MONOCYTES # BLD: 0.5 K/UL (ref 0–1.3)
MONOCYTES NFR BLD: 8.5 %
NEUTROPHILS # BLD: 3.5 K/UL (ref 1.7–7.7)
NEUTROPHILS NFR BLD: 64.3 %
PLATELET # BLD AUTO: 193 K/UL (ref 135–450)
PMV BLD AUTO: 8.6 FL (ref 5–10.5)
POTASSIUM SERPL-SCNC: 4.3 MMOL/L (ref 3.5–5.1)
PROT SERPL-MCNC: 6.3 G/DL (ref 6.4–8.2)
RBC # BLD AUTO: 4.77 M/UL (ref 4–5.2)
SODIUM SERPL-SCNC: 143 MMOL/L (ref 136–145)
TRIGL SERPL-MCNC: 80 MG/DL (ref 0–150)
VLDLC SERPL CALC-MCNC: 16 MG/DL
WBC # BLD AUTO: 5.5 K/UL (ref 4–11)

## 2025-07-21 PROCEDURE — 71271 CT THORAX LUNG CANCER SCR C-: CPT

## 2025-07-21 PROCEDURE — 85025 COMPLETE CBC W/AUTO DIFF WBC: CPT

## 2025-07-21 PROCEDURE — 36415 COLL VENOUS BLD VENIPUNCTURE: CPT

## 2025-07-21 PROCEDURE — 80053 COMPREHEN METABOLIC PANEL: CPT

## 2025-07-21 PROCEDURE — 80061 LIPID PANEL: CPT

## 2025-07-23 DIAGNOSIS — J44.0 COPD WITH ACUTE BRONCHITIS (HCC): ICD-10-CM

## 2025-07-23 DIAGNOSIS — J20.9 COPD WITH ACUTE BRONCHITIS (HCC): ICD-10-CM

## 2025-07-23 RX ORDER — ALBUTEROL SULFATE 90 UG/1
INHALANT RESPIRATORY (INHALATION)
Qty: 20.1 G | Refills: 2 | Status: SHIPPED | OUTPATIENT
Start: 2025-07-23

## 2025-07-23 NOTE — TELEPHONE ENCOUNTER
Medication:   Requested Prescriptions     Pending Prescriptions Disp Refills    albuterol sulfate HFA (PROVENTIL;VENTOLIN;PROAIR) 108 (90 Base) MCG/ACT inhaler [Pharmacy Med Name: ALBUTEROL HFA INH (200 PUFFS) 6.7GM] 20.1 g      Sig: INHALE 2 PUFFS INTO THE LUNGS FOUR TIMES DAILY AS NEEDED FOR WHEEZING          Patient Phone Number: [phone] (home)     Last appt: 6/16/2025   Next appt: 9/16/2025    Last OARRS:       4/24/2019     5:06 PM   RX Monitoring   Attestation The Prescription Monitoring Report for this patient was reviewed today.   Chronic Pain > 50 MEDD Obtained or confirmened \"Consent of Opioid Use\" on file.     PDMP Monitoring:    Last PDMP [place] Reviewed ([place]):  Review User Review Instant Review Result   FERNANDO DUTTA 6/16/2025  9:32 AM Reviewed PDMP [1]     Preferred Pharmacy:   [facility] #[id number] - [place], [place] - [address] [facility] [phone] - F [phone]  [address] [address]  Phone: [phone] Fax: [phone]

## 2025-07-24 DIAGNOSIS — G47.00 INSOMNIA, UNSPECIFIED TYPE: ICD-10-CM

## 2025-07-24 DIAGNOSIS — F41.9 ANXIETY: ICD-10-CM

## 2025-07-24 RX ORDER — ALPRAZOLAM 1 MG/1
1 TABLET ORAL NIGHTLY PRN
Qty: 30 TABLET | Refills: 0 | Status: SHIPPED | OUTPATIENT
Start: 2025-07-24 | End: 2025-08-23

## 2025-07-24 NOTE — TELEPHONE ENCOUNTER
Medication:   Requested Prescriptions     Pending Prescriptions Disp Refills    ALPRAZolam (XANAX) 1 MG tablet 30 tablet 0     Sig: Take 1 tablet by mouth nightly as needed for Sleep for up to 30 days. Max Daily Amount: 1 mg      Last Filled:      Patient Phone Number: [phone] (home)     Last appt: 6/16/2025   Next appt: 9/16/2025    Last OARRS:       4/24/2019     5:06 PM   RX Monitoring   Attestation The Prescription Monitoring Report for this patient was reviewed today.   Chronic Pain > 50 MEDD Obtained or confirmened \"Consent of Opioid Use\" on file.     PDMP Monitoring:    Last PDMP [place] as Reviewed ([place]):  Review User Review Instant Review Result   FERNANDO DUTTA 6/16/2025  9:32 AM Reviewed PDMP [1]     Preferred Pharmacy:   [facility] #[id number] - [place], [place] - [address] -  [phone] - F [phone] [address] [facility] [address]  Phone: [phone] Fax: [phone]

## 2025-07-24 NOTE — TELEPHONE ENCOUNTER
Needs refill    ALPRAZolam (XANAX) 1 MG tablet [[id number]]     [facility] #[id number] - [place], [place] - [address] - P [phone] - F [phone]  [address] [facility] [address]  Phone: [phone]  Fax: [phone]

## 2025-07-27 DIAGNOSIS — E78.2 MIXED HYPERLIPIDEMIA: ICD-10-CM

## 2025-07-28 RX ORDER — PRAVASTATIN SODIUM 20 MG
20 TABLET ORAL EVERY EVENING
Qty: 90 TABLET | Refills: 0 | Status: SHIPPED | OUTPATIENT
Start: 2025-07-28

## 2025-07-28 NOTE — TELEPHONE ENCOUNTER
Medication:   Requested Prescriptions     Pending Prescriptions Disp Refills    pravastatin (PRAVACHOL) 20 MG tablet [Pharmacy Med Name: PRAVASTATIN 20MG TABLETS] 90 tablet 0     Sig: TAKE 1 TABLET BY MOUTH EVERY EVENING      Provider out of office.     Patient Phone Number: [phone] (home)     Last appt: 6/16/2025   Next appt: 9/16/2025    Last OARRS:       4/24/2019     5:06 PM   RX Monitoring   Attestation The Prescription Monitoring Report for this patient was reviewed today.   Chronic Pain > 50 MEDD Obtained or confirmened \"Consent of Opioid Use\" on file.     PDMP Monitoring:    Last PDMP [place] Reviewed ([place]):  Review User Review Instant Review Result   FERNANDO DUTTA 6/16/2025  9:32 AM Reviewed PDMP [1]     Preferred Pharmacy:   [facility] #[id number] [place], [place] - [address] -  [phone] - F [phone]  [address] [facility] [facility] [address]  Phone: [phone] Fax: [phone]

## 2025-08-22 ENCOUNTER — APPOINTMENT (OUTPATIENT)
Dept: CT IMAGING | Age: 70
End: 2025-08-22
Payer: COMMERCIAL

## 2025-08-22 ENCOUNTER — HOSPITAL ENCOUNTER (OUTPATIENT)
Age: 70
Setting detail: OBSERVATION
Discharge: HOME | End: 2025-08-25
Attending: EMERGENCY MEDICINE | Admitting: HOSPITALIST
Payer: COMMERCIAL

## 2025-08-22 ENCOUNTER — APPOINTMENT (OUTPATIENT)
Dept: GENERAL RADIOLOGY | Age: 70
End: 2025-08-22
Payer: COMMERCIAL

## 2025-08-22 ENCOUNTER — OFFICE VISIT (OUTPATIENT)
Dept: FAMILY MEDICINE CLINIC | Age: 70
End: 2025-08-22
Payer: COMMERCIAL

## 2025-08-22 VITALS
TEMPERATURE: 98.1 F | SYSTOLIC BLOOD PRESSURE: 180 MMHG | RESPIRATION RATE: 12 BRPM | OXYGEN SATURATION: 96 % | HEART RATE: 96 BPM | DIASTOLIC BLOOD PRESSURE: 110 MMHG

## 2025-08-22 DIAGNOSIS — I48.91 ATRIAL FIBRILLATION, UNSPECIFIED TYPE (HCC): Primary | ICD-10-CM

## 2025-08-22 DIAGNOSIS — R51.9 NONINTRACTABLE HEADACHE, UNSPECIFIED CHRONICITY PATTERN, UNSPECIFIED HEADACHE TYPE: ICD-10-CM

## 2025-08-22 DIAGNOSIS — G47.00 INSOMNIA, UNSPECIFIED TYPE: ICD-10-CM

## 2025-08-22 DIAGNOSIS — R41.0 CONFUSION: ICD-10-CM

## 2025-08-22 DIAGNOSIS — R94.31 ABNORMAL ELECTROCARDIOGRAPHY: ICD-10-CM

## 2025-08-22 DIAGNOSIS — R51.9 ACHING HEADACHE: ICD-10-CM

## 2025-08-22 DIAGNOSIS — I49.9 IRREGULAR HEART RATE: ICD-10-CM

## 2025-08-22 DIAGNOSIS — I48.91 ATRIAL FIBRILLATION WITH RVR (HCC): Primary | ICD-10-CM

## 2025-08-22 DIAGNOSIS — F41.9 ANXIETY: ICD-10-CM

## 2025-08-22 LAB
ALBUMIN SERPL-MCNC: 4.2 G/DL (ref 3.4–5)
ALBUMIN/GLOB SERPL: 1.8 {RATIO} (ref 1.1–2.2)
ALP SERPL-CCNC: 50 U/L (ref 40–129)
ALT SERPL-CCNC: 15 U/L (ref 10–40)
ANION GAP SERPL CALCULATED.3IONS-SCNC: 13 MMOL/L (ref 3–16)
ANTI-XA UNFRAC HEPARIN: <0.1 IU/ML (ref 0.3–0.7)
APTT BLD: 29.2 SEC (ref 22.8–35.8)
AST SERPL-CCNC: 28 U/L (ref 15–37)
BASOPHILS # BLD: 0 K/UL (ref 0–0.2)
BASOPHILS NFR BLD: 0.6 %
BILIRUB SERPL-MCNC: 0.7 MG/DL (ref 0–1)
BUN SERPL-MCNC: 15 MG/DL (ref 7–20)
CALCIUM SERPL-MCNC: 9.5 MG/DL (ref 8.3–10.6)
CHLORIDE SERPL-SCNC: 106 MMOL/L (ref 99–110)
CO2 SERPL-SCNC: 26 MMOL/L (ref 21–32)
CREAT SERPL-MCNC: 0.6 MG/DL (ref 0.6–1.2)
DEPRECATED RDW RBC AUTO: 15.3 % (ref 12.4–15.4)
EOSINOPHIL # BLD: 0.1 K/UL (ref 0–0.6)
EOSINOPHIL NFR BLD: 2.8 %
FLUAV RNA RESP QL NAA+PROBE: NOT DETECTED
FLUBV RNA RESP QL NAA+PROBE: NOT DETECTED
GFR SERPLBLD CREATININE-BSD FMLA CKD-EPI: >90 ML/MIN/{1.73_M2}
GLUCOSE SERPL-MCNC: 100 MG/DL (ref 70–99)
HCT VFR BLD AUTO: 43.4 % (ref 36–48)
HGB BLD-MCNC: 14.6 G/DL (ref 12–16)
INR PPP: 1.08 (ref 0.86–1.14)
LYMPHOCYTES # BLD: 1.1 K/UL (ref 1–5.1)
LYMPHOCYTES NFR BLD: 21.8 %
MAGNESIUM SERPL-MCNC: 2.02 MG/DL (ref 1.8–2.4)
MCH RBC QN AUTO: 30.9 PG (ref 26–34)
MCHC RBC AUTO-ENTMCNC: 33.7 G/DL (ref 31–36)
MCV RBC AUTO: 91.5 FL (ref 80–100)
MONOCYTES # BLD: 0.4 K/UL (ref 0–1.3)
MONOCYTES NFR BLD: 7.8 %
NEUTROPHILS # BLD: 3.2 K/UL (ref 1.7–7.7)
NEUTROPHILS NFR BLD: 67 %
NT-PROBNP SERPL-MCNC: 2146 PG/ML (ref 0–124)
PLATELET # BLD AUTO: 167 K/UL (ref 135–450)
PMV BLD AUTO: 7.6 FL (ref 5–10.5)
POTASSIUM SERPL-SCNC: 3.7 MMOL/L (ref 3.5–5.1)
PROT SERPL-MCNC: 6.5 G/DL (ref 6.4–8.2)
PROTHROMBIN TIME: 14.3 SEC (ref 12.1–14.9)
RBC # BLD AUTO: 4.74 M/UL (ref 4–5.2)
SARS-COV-2 RNA RESP QL NAA+PROBE: NOT DETECTED
SODIUM SERPL-SCNC: 145 MMOL/L (ref 136–145)
TROPONIN, HIGH SENSITIVITY: 12 NG/L (ref 0–14)
TROPONIN, HIGH SENSITIVITY: 8 NG/L (ref 0–14)
TSH SERPL DL<=0.005 MIU/L-ACNC: 1.04 UIU/ML (ref 0.27–4.2)
WBC # BLD AUTO: 4.8 K/UL (ref 4–11)

## 2025-08-22 PROCEDURE — 85610 PROTHROMBIN TIME: CPT

## 2025-08-22 PROCEDURE — 80053 COMPREHEN METABOLIC PANEL: CPT

## 2025-08-22 PROCEDURE — 96365 THER/PROPH/DIAG IV INF INIT: CPT

## 2025-08-22 PROCEDURE — 87636 SARSCOV2 & INF A&B AMP PRB: CPT

## 2025-08-22 PROCEDURE — 85025 COMPLETE CBC W/AUTO DIFF WBC: CPT

## 2025-08-22 PROCEDURE — G0378 HOSPITAL OBSERVATION PER HR: HCPCS

## 2025-08-22 PROCEDURE — 2500000003 HC RX 250 WO HCPCS: Performed by: PHYSICIAN ASSISTANT

## 2025-08-22 PROCEDURE — 36415 COLL VENOUS BLD VENIPUNCTURE: CPT

## 2025-08-22 PROCEDURE — 99285 EMERGENCY DEPT VISIT HI MDM: CPT

## 2025-08-22 PROCEDURE — 83735 ASSAY OF MAGNESIUM: CPT

## 2025-08-22 PROCEDURE — 70450 CT HEAD/BRAIN W/O DYE: CPT

## 2025-08-22 PROCEDURE — 96366 THER/PROPH/DIAG IV INF ADDON: CPT

## 2025-08-22 PROCEDURE — 71045 X-RAY EXAM CHEST 1 VIEW: CPT

## 2025-08-22 PROCEDURE — 93005 ELECTROCARDIOGRAM TRACING: CPT | Performed by: EMERGENCY MEDICINE

## 2025-08-22 PROCEDURE — 1159F MED LIST DOCD IN RCRD: CPT | Performed by: NURSE PRACTITIONER

## 2025-08-22 PROCEDURE — 3080F DIAST BP >= 90 MM HG: CPT | Performed by: NURSE PRACTITIONER

## 2025-08-22 PROCEDURE — 85520 HEPARIN ASSAY: CPT

## 2025-08-22 PROCEDURE — 3077F SYST BP >= 140 MM HG: CPT | Performed by: NURSE PRACTITIONER

## 2025-08-22 PROCEDURE — 85730 THROMBOPLASTIN TIME PARTIAL: CPT

## 2025-08-22 PROCEDURE — 84484 ASSAY OF TROPONIN QUANT: CPT

## 2025-08-22 PROCEDURE — 99214 OFFICE O/P EST MOD 30 MIN: CPT | Performed by: NURSE PRACTITIONER

## 2025-08-22 PROCEDURE — 6360000002 HC RX W HCPCS: Performed by: PHYSICIAN ASSISTANT

## 2025-08-22 PROCEDURE — 84443 ASSAY THYROID STIM HORMONE: CPT

## 2025-08-22 PROCEDURE — 93000 ELECTROCARDIOGRAM COMPLETE: CPT | Performed by: NURSE PRACTITIONER

## 2025-08-22 PROCEDURE — G2211 COMPLEX E/M VISIT ADD ON: HCPCS | Performed by: NURSE PRACTITIONER

## 2025-08-22 PROCEDURE — 83880 ASSAY OF NATRIURETIC PEPTIDE: CPT

## 2025-08-22 PROCEDURE — 1123F ACP DISCUSS/DSCN MKR DOCD: CPT | Performed by: NURSE PRACTITIONER

## 2025-08-22 PROCEDURE — 6370000000 HC RX 637 (ALT 250 FOR IP)

## 2025-08-22 RX ORDER — POTASSIUM CHLORIDE 7.45 MG/ML
10 INJECTION INTRAVENOUS PRN
Status: DISCONTINUED | OUTPATIENT
Start: 2025-08-22 | End: 2025-08-25 | Stop reason: HOSPADM

## 2025-08-22 RX ORDER — ONDANSETRON 2 MG/ML
4 INJECTION INTRAMUSCULAR; INTRAVENOUS EVERY 6 HOURS PRN
Status: DISCONTINUED | OUTPATIENT
Start: 2025-08-22 | End: 2025-08-25 | Stop reason: HOSPADM

## 2025-08-22 RX ORDER — ACETAMINOPHEN 500 MG
500 TABLET ORAL EVERY 6 HOURS PRN
COMMUNITY

## 2025-08-22 RX ORDER — POLYETHYLENE GLYCOL 3350 17 G/17G
17 POWDER, FOR SOLUTION ORAL DAILY PRN
Status: DISCONTINUED | OUTPATIENT
Start: 2025-08-22 | End: 2025-08-25 | Stop reason: HOSPADM

## 2025-08-22 RX ORDER — MAGNESIUM SULFATE IN WATER 40 MG/ML
2000 INJECTION, SOLUTION INTRAVENOUS PRN
Status: DISCONTINUED | OUTPATIENT
Start: 2025-08-22 | End: 2025-08-25 | Stop reason: HOSPADM

## 2025-08-22 RX ORDER — ACETAMINOPHEN 325 MG/1
650 TABLET ORAL EVERY 6 HOURS PRN
Status: DISCONTINUED | OUTPATIENT
Start: 2025-08-22 | End: 2025-08-25 | Stop reason: HOSPADM

## 2025-08-22 RX ORDER — CARVEDILOL 6.25 MG/1
6.25 TABLET ORAL 2 TIMES DAILY
Status: DISCONTINUED | OUTPATIENT
Start: 2025-08-22 | End: 2025-08-23

## 2025-08-22 RX ORDER — SODIUM CHLORIDE 0.9 % (FLUSH) 0.9 %
5-40 SYRINGE (ML) INJECTION EVERY 12 HOURS SCHEDULED
Status: DISCONTINUED | OUTPATIENT
Start: 2025-08-22 | End: 2025-08-25 | Stop reason: HOSPADM

## 2025-08-22 RX ORDER — GUAIFENESIN 600 MG/1
1200 TABLET, EXTENDED RELEASE ORAL 2 TIMES DAILY
Status: ON HOLD | COMMUNITY
End: 2025-08-25 | Stop reason: HOSPADM

## 2025-08-22 RX ORDER — ALPRAZOLAM 1 MG/1
1 TABLET ORAL NIGHTLY PRN
Qty: 30 TABLET | Refills: 0 | Status: ON HOLD | OUTPATIENT
Start: 2025-08-23 | End: 2025-09-22

## 2025-08-22 RX ORDER — LISINOPRIL 20 MG/1
40 TABLET ORAL DAILY
Status: DISCONTINUED | OUTPATIENT
Start: 2025-08-23 | End: 2025-08-24

## 2025-08-22 RX ORDER — HEPARIN SODIUM 1000 [USP'U]/ML
2000 INJECTION, SOLUTION INTRAVENOUS; SUBCUTANEOUS PRN
Status: DISCONTINUED | OUTPATIENT
Start: 2025-08-22 | End: 2025-08-23

## 2025-08-22 RX ORDER — ONDANSETRON 4 MG/1
4 TABLET, ORALLY DISINTEGRATING ORAL EVERY 8 HOURS PRN
Status: DISCONTINUED | OUTPATIENT
Start: 2025-08-22 | End: 2025-08-25 | Stop reason: HOSPADM

## 2025-08-22 RX ORDER — HEPARIN SODIUM 1000 [USP'U]/ML
80 INJECTION, SOLUTION INTRAVENOUS; SUBCUTANEOUS PRN
Status: DISCONTINUED | OUTPATIENT
Start: 2025-08-22 | End: 2025-08-22

## 2025-08-22 RX ORDER — ALBUTEROL SULFATE 90 UG/1
2 INHALANT RESPIRATORY (INHALATION) EVERY 6 HOURS PRN
Status: DISCONTINUED | OUTPATIENT
Start: 2025-08-22 | End: 2025-08-25 | Stop reason: HOSPADM

## 2025-08-22 RX ORDER — SODIUM CHLORIDE 9 MG/ML
INJECTION, SOLUTION INTRAVENOUS PRN
Status: DISCONTINUED | OUTPATIENT
Start: 2025-08-22 | End: 2025-08-25 | Stop reason: HOSPADM

## 2025-08-22 RX ORDER — POTASSIUM CHLORIDE 1500 MG/1
40 TABLET, EXTENDED RELEASE ORAL PRN
Status: DISCONTINUED | OUTPATIENT
Start: 2025-08-22 | End: 2025-08-25 | Stop reason: HOSPADM

## 2025-08-22 RX ORDER — DILTIAZEM HCL IN NACL,ISO-OSM 125 MG/125
2.5-15 PLASTIC BAG, INJECTION (ML) INTRAVENOUS CONTINUOUS
Status: DISCONTINUED | OUTPATIENT
Start: 2025-08-22 | End: 2025-08-23

## 2025-08-22 RX ORDER — ALPRAZOLAM 0.5 MG
1 TABLET ORAL NIGHTLY PRN
Status: DISCONTINUED | OUTPATIENT
Start: 2025-08-22 | End: 2025-08-25 | Stop reason: HOSPADM

## 2025-08-22 RX ORDER — DILTIAZEM HYDROCHLORIDE 5 MG/ML
15 INJECTION INTRAVENOUS ONCE
Status: COMPLETED | OUTPATIENT
Start: 2025-08-22 | End: 2025-08-22

## 2025-08-22 RX ORDER — HEPARIN SODIUM 10000 [USP'U]/100ML
5-30 INJECTION, SOLUTION INTRAVENOUS CONTINUOUS
Status: DISCONTINUED | OUTPATIENT
Start: 2025-08-22 | End: 2025-08-23

## 2025-08-22 RX ORDER — VITAMIN B COMPLEX
1000 TABLET ORAL DAILY
Status: DISCONTINUED | OUTPATIENT
Start: 2025-08-23 | End: 2025-08-25 | Stop reason: HOSPADM

## 2025-08-22 RX ORDER — GABAPENTIN 300 MG/1
300 CAPSULE ORAL 3 TIMES DAILY
Status: DISCONTINUED | OUTPATIENT
Start: 2025-08-22 | End: 2025-08-25 | Stop reason: HOSPADM

## 2025-08-22 RX ORDER — HEPARIN SODIUM 10000 [USP'U]/100ML
5-30 INJECTION, SOLUTION INTRAVENOUS CONTINUOUS
Status: DISCONTINUED | OUTPATIENT
Start: 2025-08-22 | End: 2025-08-22

## 2025-08-22 RX ORDER — HEPARIN SODIUM 1000 [USP'U]/ML
40 INJECTION, SOLUTION INTRAVENOUS; SUBCUTANEOUS PRN
Status: DISCONTINUED | OUTPATIENT
Start: 2025-08-22 | End: 2025-08-22

## 2025-08-22 RX ORDER — ACETAMINOPHEN 650 MG/1
650 SUPPOSITORY RECTAL EVERY 6 HOURS PRN
Status: DISCONTINUED | OUTPATIENT
Start: 2025-08-22 | End: 2025-08-25 | Stop reason: HOSPADM

## 2025-08-22 RX ORDER — PRAVASTATIN SODIUM 20 MG
20 TABLET ORAL EVERY EVENING
Status: DISCONTINUED | OUTPATIENT
Start: 2025-08-22 | End: 2025-08-25 | Stop reason: HOSPADM

## 2025-08-22 RX ORDER — HEPARIN SODIUM 1000 [USP'U]/ML
80 INJECTION, SOLUTION INTRAVENOUS; SUBCUTANEOUS ONCE
Status: DISCONTINUED | OUTPATIENT
Start: 2025-08-22 | End: 2025-08-22

## 2025-08-22 RX ORDER — HEPARIN SODIUM 1000 [USP'U]/ML
4000 INJECTION, SOLUTION INTRAVENOUS; SUBCUTANEOUS ONCE
Status: COMPLETED | OUTPATIENT
Start: 2025-08-22 | End: 2025-08-23

## 2025-08-22 RX ORDER — SODIUM CHLORIDE 0.9 % (FLUSH) 0.9 %
5-40 SYRINGE (ML) INJECTION PRN
Status: DISCONTINUED | OUTPATIENT
Start: 2025-08-22 | End: 2025-08-25 | Stop reason: HOSPADM

## 2025-08-22 RX ORDER — M-VIT,TX,IRON,MINS/CALC/FOLIC 27MG-0.4MG
1 TABLET ORAL DAILY
Status: DISCONTINUED | OUTPATIENT
Start: 2025-08-23 | End: 2025-08-25 | Stop reason: HOSPADM

## 2025-08-22 RX ORDER — HEPARIN SODIUM 1000 [USP'U]/ML
4000 INJECTION, SOLUTION INTRAVENOUS; SUBCUTANEOUS PRN
Status: DISCONTINUED | OUTPATIENT
Start: 2025-08-22 | End: 2025-08-23

## 2025-08-22 RX ADMIN — DILTIAZEM HYDROCHLORIDE 15 MG: 5 INJECTION, SOLUTION INTRAVENOUS at 16:50

## 2025-08-22 RX ADMIN — Medication 5 MG/HR: at 16:54

## 2025-08-22 ASSESSMENT — ENCOUNTER SYMPTOMS
VOMITING: 0
NAUSEA: 0
COUGH: 1
DIARRHEA: 0
SHORTNESS OF BREATH: 0

## 2025-08-22 ASSESSMENT — PAIN SCALES - GENERAL
PAINLEVEL_OUTOF10: 0
PAINLEVEL_OUTOF10: 2

## 2025-08-22 ASSESSMENT — LIFESTYLE VARIABLES
HOW MANY STANDARD DRINKS CONTAINING ALCOHOL DO YOU HAVE ON A TYPICAL DAY: PATIENT DOES NOT DRINK
HOW OFTEN DO YOU HAVE A DRINK CONTAINING ALCOHOL: NEVER

## 2025-08-22 ASSESSMENT — PAIN - FUNCTIONAL ASSESSMENT: PAIN_FUNCTIONAL_ASSESSMENT: 0-10

## 2025-08-23 ENCOUNTER — APPOINTMENT (OUTPATIENT)
Age: 70
End: 2025-08-23
Payer: COMMERCIAL

## 2025-08-23 LAB
ANION GAP SERPL CALCULATED.3IONS-SCNC: 11 MMOL/L (ref 3–16)
ANTI-XA UNFRAC HEPARIN: 0.12 IU/ML (ref 0.3–0.7)
BASOPHILS # BLD: 0 K/UL (ref 0–0.2)
BASOPHILS NFR BLD: 0.7 %
BUN SERPL-MCNC: 11 MG/DL (ref 7–20)
CALCIUM SERPL-MCNC: 9.4 MG/DL (ref 8.3–10.6)
CHLORIDE SERPL-SCNC: 106 MMOL/L (ref 99–110)
CO2 SERPL-SCNC: 24 MMOL/L (ref 21–32)
CREAT SERPL-MCNC: 0.4 MG/DL (ref 0.6–1.2)
DEPRECATED RDW RBC AUTO: 15.2 % (ref 12.4–15.4)
ECHO AO ASC DIAM: 3.3 CM
ECHO AO ASCENDING AORTA INDEX: 1.82 CM/M2
ECHO AO ROOT DIAM: 3.1 CM
ECHO AO ROOT INDEX: 1.71 CM/M2
ECHO AV AREA PEAK VELOCITY: 2.7 CM2
ECHO AV AREA/BSA PEAK VELOCITY: 1.5 CM2/M2
ECHO AV PEAK GRADIENT: 4 MMHG
ECHO AV PEAK VELOCITY: 1 M/S
ECHO AV VELOCITY RATIO: 0.8
ECHO BSA: 1.81 M2
ECHO EST RA PRESSURE: 8 MMHG
ECHO IVC INSP: 0.8 CM
ECHO IVC PROX: 2.2 CM
ECHO LA AREA 2C: 34.8 CM2
ECHO LA AREA 4C: 32.9 CM2
ECHO LA MAJOR AXIS: 6.7 CM
ECHO LA MINOR AXIS: 6.9 CM
ECHO LA VOL BP: 140 ML (ref 22–52)
ECHO LA VOL MOD A2C: 144 ML (ref 22–52)
ECHO LA VOL MOD A4C: 132 ML (ref 22–52)
ECHO LA VOL/BSA BIPLANE: 77 ML/M2 (ref 16–34)
ECHO LA VOLUME INDEX MOD A2C: 80 ML/M2 (ref 16–34)
ECHO LA VOLUME INDEX MOD A4C: 73 ML/M2 (ref 16–34)
ECHO LV EDV 3D: 102 ML
ECHO LV EDV A2C: 70 ML
ECHO LV EDV A4C: 72 ML
ECHO LV EDV INDEX 3D: 56 ML/M2
ECHO LV EDV INDEX A4C: 40 ML/M2
ECHO LV EDV NDEX A2C: 39 ML/M2
ECHO LV EF PHYSICIAN: 50 %
ECHO LV EJECTION FRACTION 3D: 49 %
ECHO LV EJECTION FRACTION A2C: 48 %
ECHO LV EJECTION FRACTION A4C: 52 %
ECHO LV EJECTION FRACTION BIPLANE: 49 % (ref 55–100)
ECHO LV ESV 3D: 52 ML
ECHO LV ESV A2C: 36 ML
ECHO LV ESV A4C: 35 ML
ECHO LV ESV INDEX 3D: 29 ML/M2
ECHO LV ESV INDEX A2C: 20 ML/M2
ECHO LV ESV INDEX A4C: 19 ML/M2
ECHO LV FRACTIONAL SHORTENING: 35 % (ref 28–44)
ECHO LV INTERNAL DIMENSION DIASTOLE INDEX: 2.21 CM/M2
ECHO LV INTERNAL DIMENSION DIASTOLIC MMODE: 4.3 CM (ref 3.9–5.3)
ECHO LV INTERNAL DIMENSION DIASTOLIC: 4 CM (ref 3.9–5.3)
ECHO LV INTERNAL DIMENSION SYSTOLIC INDEX: 1.44 CM/M2
ECHO LV INTERNAL DIMENSION SYSTOLIC MMODE: 2.9 CM
ECHO LV INTERNAL DIMENSION SYSTOLIC: 2.6 CM
ECHO LV IVSD MMODE: 1 CM (ref 0.6–0.9)
ECHO LV IVSD: 1 CM (ref 0.6–0.9)
ECHO LV MASS 2D: 136.2 G (ref 67–162)
ECHO LV MASS 3D INDEX: 79.6 G/M2
ECHO LV MASS 3D: 144 G
ECHO LV MASS INDEX 2D: 75.2 G/M2 (ref 43–95)
ECHO LV POSTERIOR WALL DIASTOLIC MMODE: 1.1 CM (ref 0.6–0.9)
ECHO LV POSTERIOR WALL DIASTOLIC: 1.1 CM (ref 0.6–0.9)
ECHO LV RELATIVE WALL THICKNESS RATIO: 0.55
ECHO LVOT AREA: 3.5 CM2
ECHO LVOT DIAM: 2.1 CM
ECHO LVOT MEAN GRADIENT: 1 MMHG
ECHO LVOT PEAK GRADIENT: 3 MMHG
ECHO LVOT PEAK VELOCITY: 0.8 M/S
ECHO LVOT STROKE VOLUME INDEX: 26.2 ML/M2
ECHO LVOT SV: 47.4 ML
ECHO LVOT VTI: 13.7 CM
ECHO PV MAX VELOCITY: 0.8 M/S
ECHO PV PEAK GRADIENT: 3 MMHG
ECHO RA AREA 4C: 31.3 CM2
ECHO RA END SYSTOLIC VOLUME APICAL 4 CHAMBER INDEX BSA: 65 ML/M2
ECHO RA VOLUME: 117 ML
ECHO RIGHT VENTRICULAR SYSTOLIC PRESSURE (RVSP): 38 MMHG
ECHO RV BASAL DIMENSION: 4.6 CM
ECHO RV FREE WALL PEAK S': 10.9 CM/S
ECHO RV LONGITUDINAL DIMENSION: 6.6 CM
ECHO RV MID DIMENSION: 2.6 CM
ECHO RV TAPSE: 2.1 CM (ref 1.7–?)
ECHO TV REGURGITANT MAX VELOCITY: 2.74 M/S
ECHO TV REGURGITANT PEAK GRADIENT: 30 MMHG
EKG DIAGNOSIS: NORMAL
EKG Q-T INTERVAL: 322 MS
EKG QRS DURATION: 78 MS
EKG QTC CALCULATION (BAZETT): 443 MS
EKG R AXIS: 95 DEGREES
EKG T AXIS: -78 DEGREES
EKG VENTRICULAR RATE: 114 BPM
EOSINOPHIL # BLD: 0.2 K/UL (ref 0–0.6)
EOSINOPHIL NFR BLD: 2.9 %
GFR SERPLBLD CREATININE-BSD FMLA CKD-EPI: >90 ML/MIN/{1.73_M2}
GLUCOSE SERPL-MCNC: 94 MG/DL (ref 70–99)
HCT VFR BLD AUTO: 44 % (ref 36–48)
HGB BLD-MCNC: 14.8 G/DL (ref 12–16)
LYMPHOCYTES # BLD: 1.2 K/UL (ref 1–5.1)
LYMPHOCYTES NFR BLD: 22.5 %
MCH RBC QN AUTO: 30.5 PG (ref 26–34)
MCHC RBC AUTO-ENTMCNC: 33.6 G/DL (ref 31–36)
MCV RBC AUTO: 90.8 FL (ref 80–100)
MONOCYTES # BLD: 0.5 K/UL (ref 0–1.3)
MONOCYTES NFR BLD: 9.3 %
NEUTROPHILS # BLD: 3.5 K/UL (ref 1.7–7.7)
NEUTROPHILS NFR BLD: 64.6 %
PLATELET # BLD AUTO: 158 K/UL (ref 135–450)
PMV BLD AUTO: 7.7 FL (ref 5–10.5)
POTASSIUM SERPL-SCNC: 3.3 MMOL/L (ref 3.5–5.1)
RBC # BLD AUTO: 4.85 M/UL (ref 4–5.2)
SODIUM SERPL-SCNC: 141 MMOL/L (ref 136–145)
WBC # BLD AUTO: 5.4 K/UL (ref 4–11)

## 2025-08-23 PROCEDURE — 85025 COMPLETE CBC W/AUTO DIFF WBC: CPT

## 2025-08-23 PROCEDURE — 76376 3D RENDER W/INTRP POSTPROCES: CPT

## 2025-08-23 PROCEDURE — 93010 ELECTROCARDIOGRAM REPORT: CPT | Performed by: INTERNAL MEDICINE

## 2025-08-23 PROCEDURE — 96366 THER/PROPH/DIAG IV INF ADDON: CPT

## 2025-08-23 PROCEDURE — 76376 3D RENDER W/INTRP POSTPROCES: CPT | Performed by: INTERNAL MEDICINE

## 2025-08-23 PROCEDURE — 36415 COLL VENOUS BLD VENIPUNCTURE: CPT

## 2025-08-23 PROCEDURE — G0378 HOSPITAL OBSERVATION PER HR: HCPCS

## 2025-08-23 PROCEDURE — 6370000000 HC RX 637 (ALT 250 FOR IP): Performed by: INTERNAL MEDICINE

## 2025-08-23 PROCEDURE — 93306 TTE W/DOPPLER COMPLETE: CPT | Performed by: INTERNAL MEDICINE

## 2025-08-23 PROCEDURE — 99223 1ST HOSP IP/OBS HIGH 75: CPT | Performed by: INTERNAL MEDICINE

## 2025-08-23 PROCEDURE — 2500000003 HC RX 250 WO HCPCS

## 2025-08-23 PROCEDURE — 6360000002 HC RX W HCPCS

## 2025-08-23 PROCEDURE — 6370000000 HC RX 637 (ALT 250 FOR IP)

## 2025-08-23 PROCEDURE — 85520 HEPARIN ASSAY: CPT

## 2025-08-23 PROCEDURE — 96368 THER/DIAG CONCURRENT INF: CPT

## 2025-08-23 PROCEDURE — 80048 BASIC METABOLIC PNL TOTAL CA: CPT

## 2025-08-23 PROCEDURE — 6370000000 HC RX 637 (ALT 250 FOR IP): Performed by: STUDENT IN AN ORGANIZED HEALTH CARE EDUCATION/TRAINING PROGRAM

## 2025-08-23 RX ORDER — METOPROLOL TARTRATE 50 MG
100 TABLET ORAL 2 TIMES DAILY
Status: DISCONTINUED | OUTPATIENT
Start: 2025-08-23 | End: 2025-08-24

## 2025-08-23 RX ORDER — POTASSIUM CHLORIDE 1500 MG/1
40 TABLET, EXTENDED RELEASE ORAL ONCE
Status: COMPLETED | OUTPATIENT
Start: 2025-08-23 | End: 2025-08-23

## 2025-08-23 RX ORDER — ENOXAPARIN SODIUM 100 MG/ML
1 INJECTION SUBCUTANEOUS 2 TIMES DAILY
Status: DISCONTINUED | OUTPATIENT
Start: 2025-08-23 | End: 2025-08-23

## 2025-08-23 RX ORDER — DILTIAZEM HYDROCHLORIDE 30 MG/1
30 TABLET, FILM COATED ORAL EVERY 6 HOURS SCHEDULED
Status: DISCONTINUED | OUTPATIENT
Start: 2025-08-23 | End: 2025-08-24

## 2025-08-23 RX ADMIN — ALPRAZOLAM 1 MG: 0.5 TABLET ORAL at 00:03

## 2025-08-23 RX ADMIN — DILTIAZEM HYDROCHLORIDE 30 MG: 30 TABLET, FILM COATED ORAL at 23:58

## 2025-08-23 RX ADMIN — SODIUM CHLORIDE, PRESERVATIVE FREE 10 ML: 5 INJECTION INTRAVENOUS at 09:30

## 2025-08-23 RX ADMIN — CARVEDILOL 6.25 MG: 6.25 TABLET, FILM COATED ORAL at 09:30

## 2025-08-23 RX ADMIN — ALPRAZOLAM 1 MG: 0.5 TABLET ORAL at 21:00

## 2025-08-23 RX ADMIN — PRAVASTATIN SODIUM 20 MG: 20 TABLET ORAL at 17:33

## 2025-08-23 RX ADMIN — HEPARIN SODIUM 2000 UNITS: 1000 INJECTION, SOLUTION INTRAVENOUS; SUBCUTANEOUS at 08:00

## 2025-08-23 RX ADMIN — POLYETHYLENE GLYCOL 3350 17 G: 17 POWDER, FOR SOLUTION ORAL at 21:00

## 2025-08-23 RX ADMIN — PRAVASTATIN SODIUM 20 MG: 20 TABLET ORAL at 00:03

## 2025-08-23 RX ADMIN — GABAPENTIN 300 MG: 300 CAPSULE ORAL at 20:55

## 2025-08-23 RX ADMIN — APIXABAN 5 MG: 5 TABLET, FILM COATED ORAL at 20:55

## 2025-08-23 RX ADMIN — GABAPENTIN 300 MG: 300 CAPSULE ORAL at 00:03

## 2025-08-23 RX ADMIN — CARVEDILOL 6.25 MG: 6.25 TABLET, FILM COATED ORAL at 00:03

## 2025-08-23 RX ADMIN — HEPARIN SODIUM AND DEXTROSE 12 UNITS/KG/HR: 10000; 5 INJECTION INTRAVENOUS at 00:15

## 2025-08-23 RX ADMIN — SODIUM CHLORIDE, PRESERVATIVE FREE 10 ML: 5 INJECTION INTRAVENOUS at 00:18

## 2025-08-23 RX ADMIN — Medication 1000 UNITS: at 09:30

## 2025-08-23 RX ADMIN — Medication 1 TABLET: at 09:30

## 2025-08-23 RX ADMIN — DILTIAZEM HYDROCHLORIDE 30 MG: 30 TABLET, FILM COATED ORAL at 17:33

## 2025-08-23 RX ADMIN — GABAPENTIN 300 MG: 300 CAPSULE ORAL at 14:46

## 2025-08-23 RX ADMIN — GABAPENTIN 300 MG: 300 CAPSULE ORAL at 09:30

## 2025-08-23 RX ADMIN — HEPARIN SODIUM 4000 UNITS: 1000 INJECTION, SOLUTION INTRAVENOUS; SUBCUTANEOUS at 00:03

## 2025-08-23 RX ADMIN — APIXABAN 5 MG: 5 TABLET, FILM COATED ORAL at 12:32

## 2025-08-23 RX ADMIN — ACETAMINOPHEN 650 MG: 325 TABLET ORAL at 00:15

## 2025-08-23 RX ADMIN — DILTIAZEM HYDROCHLORIDE 30 MG: 30 TABLET, FILM COATED ORAL at 11:49

## 2025-08-23 RX ADMIN — SODIUM CHLORIDE, PRESERVATIVE FREE 10 ML: 5 INJECTION INTRAVENOUS at 21:01

## 2025-08-23 RX ADMIN — METOPROLOL TARTRATE 100 MG: 50 TABLET, FILM COATED ORAL at 12:31

## 2025-08-23 RX ADMIN — POTASSIUM CHLORIDE 40 MEQ: 1500 TABLET, EXTENDED RELEASE ORAL at 09:30

## 2025-08-23 RX ADMIN — METOPROLOL TARTRATE 100 MG: 50 TABLET, FILM COATED ORAL at 20:55

## 2025-08-23 ASSESSMENT — PAIN DESCRIPTION - LOCATION
LOCATION: CHEST
LOCATION: HIP
LOCATION: GENERALIZED;HIP;KNEE
LOCATION: HIP

## 2025-08-23 ASSESSMENT — PAIN SCALES - GENERAL
PAINLEVEL_OUTOF10: 4
PAINLEVEL_OUTOF10: 0
PAINLEVEL_OUTOF10: 0
PAINLEVEL_OUTOF10: 6
PAINLEVEL_OUTOF10: 4
PAINLEVEL_OUTOF10: 0
PAINLEVEL_OUTOF10: 0
PAINLEVEL_OUTOF10: 4

## 2025-08-23 ASSESSMENT — LIFESTYLE VARIABLES
HOW MANY STANDARD DRINKS CONTAINING ALCOHOL DO YOU HAVE ON A TYPICAL DAY: PATIENT DOES NOT DRINK
HOW OFTEN DO YOU HAVE A DRINK CONTAINING ALCOHOL: PATIENT UNABLE TO ANSWER

## 2025-08-23 ASSESSMENT — PAIN DESCRIPTION - DESCRIPTORS: DESCRIPTORS: ACHING

## 2025-08-23 ASSESSMENT — PAIN DESCRIPTION - ORIENTATION: ORIENTATION: LEFT;RIGHT

## 2025-08-24 LAB
ANION GAP SERPL CALCULATED.3IONS-SCNC: 9 MMOL/L (ref 3–16)
BASOPHILS # BLD: 0 K/UL (ref 0–0.2)
BASOPHILS NFR BLD: 0.7 %
BUN SERPL-MCNC: 13 MG/DL (ref 7–20)
CALCIUM SERPL-MCNC: 9.2 MG/DL (ref 8.3–10.6)
CHLORIDE SERPL-SCNC: 107 MMOL/L (ref 99–110)
CO2 SERPL-SCNC: 24 MMOL/L (ref 21–32)
CREAT SERPL-MCNC: 0.5 MG/DL (ref 0.6–1.2)
DEPRECATED RDW RBC AUTO: 15.1 % (ref 12.4–15.4)
EOSINOPHIL # BLD: 0.1 K/UL (ref 0–0.6)
EOSINOPHIL NFR BLD: 2.6 %
GFR SERPLBLD CREATININE-BSD FMLA CKD-EPI: >90 ML/MIN/{1.73_M2}
GLUCOSE SERPL-MCNC: 97 MG/DL (ref 70–99)
HCT VFR BLD AUTO: 41.2 % (ref 36–48)
HGB BLD-MCNC: 14.3 G/DL (ref 12–16)
LYMPHOCYTES # BLD: 1.3 K/UL (ref 1–5.1)
LYMPHOCYTES NFR BLD: 27.3 %
MAGNESIUM SERPL-MCNC: 2.05 MG/DL (ref 1.8–2.4)
MCH RBC QN AUTO: 31.4 PG (ref 26–34)
MCHC RBC AUTO-ENTMCNC: 34.6 G/DL (ref 31–36)
MCV RBC AUTO: 90.7 FL (ref 80–100)
MONOCYTES # BLD: 0.5 K/UL (ref 0–1.3)
MONOCYTES NFR BLD: 10 %
NEUTROPHILS # BLD: 2.9 K/UL (ref 1.7–7.7)
NEUTROPHILS NFR BLD: 59.4 %
PLATELET # BLD AUTO: 166 K/UL (ref 135–450)
PMV BLD AUTO: 7.8 FL (ref 5–10.5)
POTASSIUM SERPL-SCNC: 4.1 MMOL/L (ref 3.5–5.1)
RBC # BLD AUTO: 4.54 M/UL (ref 4–5.2)
SODIUM SERPL-SCNC: 140 MMOL/L (ref 136–145)
WBC # BLD AUTO: 4.9 K/UL (ref 4–11)

## 2025-08-24 PROCEDURE — 80048 BASIC METABOLIC PNL TOTAL CA: CPT

## 2025-08-24 PROCEDURE — 36415 COLL VENOUS BLD VENIPUNCTURE: CPT

## 2025-08-24 PROCEDURE — 94760 N-INVAS EAR/PLS OXIMETRY 1: CPT

## 2025-08-24 PROCEDURE — 85025 COMPLETE CBC W/AUTO DIFF WBC: CPT

## 2025-08-24 PROCEDURE — 99232 SBSQ HOSP IP/OBS MODERATE 35: CPT | Performed by: INTERNAL MEDICINE

## 2025-08-24 PROCEDURE — 83735 ASSAY OF MAGNESIUM: CPT

## 2025-08-24 PROCEDURE — 6370000000 HC RX 637 (ALT 250 FOR IP): Performed by: INTERNAL MEDICINE

## 2025-08-24 PROCEDURE — 2700000000 HC OXYGEN THERAPY PER DAY

## 2025-08-24 PROCEDURE — 2500000003 HC RX 250 WO HCPCS

## 2025-08-24 PROCEDURE — G0378 HOSPITAL OBSERVATION PER HR: HCPCS

## 2025-08-24 PROCEDURE — 6370000000 HC RX 637 (ALT 250 FOR IP)

## 2025-08-24 RX ORDER — METOPROLOL TARTRATE 50 MG
50 TABLET ORAL 2 TIMES DAILY
Status: DISCONTINUED | OUTPATIENT
Start: 2025-08-24 | End: 2025-08-25 | Stop reason: HOSPADM

## 2025-08-24 RX ORDER — LISINOPRIL 20 MG/1
20 TABLET ORAL DAILY
Status: DISCONTINUED | OUTPATIENT
Start: 2025-08-25 | End: 2025-08-25 | Stop reason: HOSPADM

## 2025-08-24 RX ADMIN — ALPRAZOLAM 1 MG: 0.5 TABLET ORAL at 20:54

## 2025-08-24 RX ADMIN — GABAPENTIN 300 MG: 300 CAPSULE ORAL at 20:54

## 2025-08-24 RX ADMIN — METOPROLOL TARTRATE 50 MG: 50 TABLET, FILM COATED ORAL at 20:54

## 2025-08-24 RX ADMIN — GABAPENTIN 300 MG: 300 CAPSULE ORAL at 08:47

## 2025-08-24 RX ADMIN — GABAPENTIN 300 MG: 300 CAPSULE ORAL at 14:39

## 2025-08-24 RX ADMIN — METOPROLOL TARTRATE 100 MG: 50 TABLET, FILM COATED ORAL at 08:47

## 2025-08-24 RX ADMIN — DILTIAZEM HYDROCHLORIDE 30 MG: 30 TABLET, FILM COATED ORAL at 05:12

## 2025-08-24 RX ADMIN — POLYETHYLENE GLYCOL 3350 17 G: 17 POWDER, FOR SOLUTION ORAL at 20:54

## 2025-08-24 RX ADMIN — SODIUM CHLORIDE, PRESERVATIVE FREE 10 ML: 5 INJECTION INTRAVENOUS at 20:55

## 2025-08-24 RX ADMIN — Medication 1000 UNITS: at 08:47

## 2025-08-24 RX ADMIN — PRAVASTATIN SODIUM 20 MG: 20 TABLET ORAL at 17:16

## 2025-08-24 RX ADMIN — APIXABAN 5 MG: 5 TABLET, FILM COATED ORAL at 20:54

## 2025-08-24 RX ADMIN — SODIUM CHLORIDE, PRESERVATIVE FREE 10 ML: 5 INJECTION INTRAVENOUS at 08:47

## 2025-08-24 RX ADMIN — Medication 1 TABLET: at 08:47

## 2025-08-24 RX ADMIN — APIXABAN 5 MG: 5 TABLET, FILM COATED ORAL at 08:47

## 2025-08-24 ASSESSMENT — PAIN SCALES - GENERAL
PAINLEVEL_OUTOF10: 0

## 2025-08-25 ENCOUNTER — APPOINTMENT (OUTPATIENT)
Age: 70
End: 2025-08-25
Attending: INTERNAL MEDICINE
Payer: COMMERCIAL

## 2025-08-25 VITALS
OXYGEN SATURATION: 95 % | RESPIRATION RATE: 18 BRPM | HEIGHT: 68 IN | SYSTOLIC BLOOD PRESSURE: 158 MMHG | HEART RATE: 89 BPM | DIASTOLIC BLOOD PRESSURE: 88 MMHG | TEMPERATURE: 97.5 F | WEIGHT: 148.9 LBS | BODY MASS INDEX: 22.57 KG/M2

## 2025-08-25 DIAGNOSIS — R91.1 PULMONARY NODULE: Primary | ICD-10-CM

## 2025-08-25 LAB
ANION GAP SERPL CALCULATED.3IONS-SCNC: 9 MMOL/L (ref 3–16)
BASOPHILS # BLD: 0 K/UL (ref 0–0.2)
BASOPHILS NFR BLD: 0.6 %
BUN SERPL-MCNC: 14 MG/DL (ref 7–20)
CALCIUM SERPL-MCNC: 9.3 MG/DL (ref 8.3–10.6)
CHLORIDE SERPL-SCNC: 106 MMOL/L (ref 99–110)
CO2 SERPL-SCNC: 25 MMOL/L (ref 21–32)
CREAT SERPL-MCNC: 0.5 MG/DL (ref 0.6–1.2)
DEPRECATED RDW RBC AUTO: 15.3 % (ref 12.4–15.4)
ECHO BSA: 1.81 M2
EOSINOPHIL # BLD: 0.1 K/UL (ref 0–0.6)
EOSINOPHIL NFR BLD: 3.1 %
GFR SERPLBLD CREATININE-BSD FMLA CKD-EPI: >90 ML/MIN/{1.73_M2}
GLUCOSE SERPL-MCNC: 90 MG/DL (ref 70–99)
HCT VFR BLD AUTO: 44.4 % (ref 36–48)
HGB BLD-MCNC: 15.2 G/DL (ref 12–16)
LYMPHOCYTES # BLD: 1.2 K/UL (ref 1–5.1)
LYMPHOCYTES NFR BLD: 28.7 %
MAGNESIUM SERPL-MCNC: 1.94 MG/DL (ref 1.8–2.4)
MCH RBC QN AUTO: 31 PG (ref 26–34)
MCHC RBC AUTO-ENTMCNC: 34.1 G/DL (ref 31–36)
MCV RBC AUTO: 90.8 FL (ref 80–100)
MONOCYTES # BLD: 0.5 K/UL (ref 0–1.3)
MONOCYTES NFR BLD: 10.7 %
NEUTROPHILS # BLD: 2.4 K/UL (ref 1.7–7.7)
NEUTROPHILS NFR BLD: 56.9 %
NUC STRESS EJECTION FRACTION: 46 %
NUC STRESS LV EDV: 127 ML (ref 56–104)
NUC STRESS LV ESV: 69 ML (ref 19–49)
NUC STRESS LV MASS: 154 G
PLATELET # BLD AUTO: 157 K/UL (ref 135–450)
PMV BLD AUTO: 7.8 FL (ref 5–10.5)
POTASSIUM SERPL-SCNC: 4.3 MMOL/L (ref 3.5–5.1)
RBC # BLD AUTO: 4.89 M/UL (ref 4–5.2)
SODIUM SERPL-SCNC: 140 MMOL/L (ref 136–145)
STRESS BASELINE DIAS BP: 87 MMHG
STRESS BASELINE HR: 79 BPM
STRESS BASELINE SYS BP: 159 MMHG
STRESS ESTIMATED WORKLOAD: 1 METS
STRESS EXERCISE DUR MIN: 4 MIN
STRESS EXERCISE DUR SEC: 0 SEC
STRESS O2 SAT PEAK: 94 %
STRESS O2 SAT REST: 93 %
STRESS PEAK DIAS BP: 87 MMHG
STRESS PEAK SYS BP: 137 MMHG
STRESS PERCENT HR ACHIEVED: 68 %
STRESS POST PEAK HR: 102 BPM
STRESS RATE PRESSURE PRODUCT: ABNORMAL BPM*MMHG
STRESS TARGET HR: 149 BPM
TID: 1.21
WBC # BLD AUTO: 4.3 K/UL (ref 4–11)

## 2025-08-25 PROCEDURE — 93018 CV STRESS TEST I&R ONLY: CPT | Performed by: INTERNAL MEDICINE

## 2025-08-25 PROCEDURE — 93017 CV STRESS TEST TRACING ONLY: CPT

## 2025-08-25 PROCEDURE — A9502 TC99M TETROFOSMIN: HCPCS | Performed by: INTERNAL MEDICINE

## 2025-08-25 PROCEDURE — 78452 HT MUSCLE IMAGE SPECT MULT: CPT

## 2025-08-25 PROCEDURE — 6370000000 HC RX 637 (ALT 250 FOR IP): Performed by: INTERNAL MEDICINE

## 2025-08-25 PROCEDURE — G0378 HOSPITAL OBSERVATION PER HR: HCPCS

## 2025-08-25 PROCEDURE — 80048 BASIC METABOLIC PNL TOTAL CA: CPT

## 2025-08-25 PROCEDURE — 99223 1ST HOSP IP/OBS HIGH 75: CPT | Performed by: INTERNAL MEDICINE

## 2025-08-25 PROCEDURE — 3430000000 HC RX DIAGNOSTIC RADIOPHARMACEUTICAL: Performed by: INTERNAL MEDICINE

## 2025-08-25 PROCEDURE — 6370000000 HC RX 637 (ALT 250 FOR IP)

## 2025-08-25 PROCEDURE — 2500000003 HC RX 250 WO HCPCS

## 2025-08-25 PROCEDURE — 36415 COLL VENOUS BLD VENIPUNCTURE: CPT

## 2025-08-25 PROCEDURE — 78452 HT MUSCLE IMAGE SPECT MULT: CPT | Performed by: INTERNAL MEDICINE

## 2025-08-25 PROCEDURE — 93016 CV STRESS TEST SUPVJ ONLY: CPT | Performed by: INTERNAL MEDICINE

## 2025-08-25 PROCEDURE — 83735 ASSAY OF MAGNESIUM: CPT

## 2025-08-25 PROCEDURE — 85025 COMPLETE CBC W/AUTO DIFF WBC: CPT

## 2025-08-25 PROCEDURE — 6360000002 HC RX W HCPCS: Performed by: INTERNAL MEDICINE

## 2025-08-25 PROCEDURE — 94760 N-INVAS EAR/PLS OXIMETRY 1: CPT

## 2025-08-25 RX ORDER — RAMIPRIL 10 MG/1
10 CAPSULE ORAL DAILY
Qty: 30 CAPSULE | Refills: 3 | Status: SHIPPED | OUTPATIENT
Start: 2025-08-25

## 2025-08-25 RX ORDER — METOPROLOL TARTRATE 50 MG
50 TABLET ORAL 2 TIMES DAILY
Qty: 60 TABLET | Refills: 3 | Status: SHIPPED | OUTPATIENT
Start: 2025-08-25

## 2025-08-25 RX ORDER — REGADENOSON 0.08 MG/ML
0.4 INJECTION, SOLUTION INTRAVENOUS
Status: COMPLETED | OUTPATIENT
Start: 2025-08-25 | End: 2025-08-25

## 2025-08-25 RX ADMIN — TETROFOSMIN 11.1 MILLICURIE: 1.38 INJECTION, POWDER, LYOPHILIZED, FOR SOLUTION INTRAVENOUS at 12:28

## 2025-08-25 RX ADMIN — APIXABAN 5 MG: 5 TABLET, FILM COATED ORAL at 08:45

## 2025-08-25 RX ADMIN — REGADENOSON 0.4 MG: 0.08 INJECTION, SOLUTION INTRAVENOUS at 13:34

## 2025-08-25 RX ADMIN — GABAPENTIN 300 MG: 300 CAPSULE ORAL at 08:45

## 2025-08-25 RX ADMIN — SODIUM CHLORIDE, PRESERVATIVE FREE 10 ML: 5 INJECTION INTRAVENOUS at 08:44

## 2025-08-25 RX ADMIN — GABAPENTIN 300 MG: 300 CAPSULE ORAL at 15:15

## 2025-08-25 RX ADMIN — ACETAMINOPHEN 650 MG: 325 TABLET ORAL at 11:26

## 2025-08-25 RX ADMIN — PRAVASTATIN SODIUM 20 MG: 20 TABLET ORAL at 17:32

## 2025-08-25 RX ADMIN — Medication 1 TABLET: at 08:45

## 2025-08-25 RX ADMIN — METOPROLOL TARTRATE 50 MG: 50 TABLET, FILM COATED ORAL at 08:45

## 2025-08-25 RX ADMIN — TETROFOSMIN 34.8 MILLICURIE: 1.38 INJECTION, POWDER, LYOPHILIZED, FOR SOLUTION INTRAVENOUS at 13:45

## 2025-08-25 RX ADMIN — Medication 1000 UNITS: at 08:45

## 2025-08-25 ASSESSMENT — PAIN SCALES - GENERAL
PAINLEVEL_OUTOF10: 0
PAINLEVEL_OUTOF10: 5
PAINLEVEL_OUTOF10: 0
PAINLEVEL_OUTOF10: 0
PAINLEVEL_OUTOF10: 4

## 2025-08-25 ASSESSMENT — PAIN DESCRIPTION - LOCATION
LOCATION: HIP
LOCATION: HIP

## 2025-08-25 ASSESSMENT — PAIN - FUNCTIONAL ASSESSMENT: PAIN_FUNCTIONAL_ASSESSMENT: 0-10

## 2025-08-25 ASSESSMENT — PAIN DESCRIPTION - ORIENTATION
ORIENTATION: LEFT
ORIENTATION: LEFT

## 2025-08-26 DIAGNOSIS — M13.0 POLYARTHROPATHY, MULTIPLE SITES: ICD-10-CM

## 2025-08-26 RX ORDER — METOPROLOL TARTRATE 50 MG
50 TABLET ORAL 2 TIMES DAILY
Qty: 180 TABLET | OUTPATIENT
Start: 2025-08-26

## 2025-08-26 RX ORDER — GABAPENTIN 300 MG/1
300 CAPSULE ORAL 3 TIMES DAILY
Qty: 270 CAPSULE | Refills: 0 | Status: SHIPPED | OUTPATIENT
Start: 2025-08-26 | End: 2025-11-24

## 2025-09-02 ENCOUNTER — OFFICE VISIT (OUTPATIENT)
Dept: FAMILY MEDICINE CLINIC | Age: 70
End: 2025-09-02
Payer: COMMERCIAL

## 2025-09-02 VITALS
DIASTOLIC BLOOD PRESSURE: 87 MMHG | OXYGEN SATURATION: 96 % | TEMPERATURE: 98.2 F | SYSTOLIC BLOOD PRESSURE: 137 MMHG | WEIGHT: 150.2 LBS | BODY MASS INDEX: 22.84 KG/M2 | HEART RATE: 90 BPM

## 2025-09-02 DIAGNOSIS — I48.91 ATRIAL FIBRILLATION WITH RVR (HCC): Primary | ICD-10-CM

## 2025-09-02 DIAGNOSIS — Z09 HOSPITAL DISCHARGE FOLLOW-UP: ICD-10-CM

## 2025-09-02 PROCEDURE — 1160F RVW MEDS BY RX/DR IN RCRD: CPT | Performed by: NURSE PRACTITIONER

## 2025-09-02 PROCEDURE — 3079F DIAST BP 80-89 MM HG: CPT | Performed by: NURSE PRACTITIONER

## 2025-09-02 PROCEDURE — 99214 OFFICE O/P EST MOD 30 MIN: CPT | Performed by: NURSE PRACTITIONER

## 2025-09-02 PROCEDURE — 1123F ACP DISCUSS/DSCN MKR DOCD: CPT | Performed by: NURSE PRACTITIONER

## 2025-09-02 PROCEDURE — 3075F SYST BP GE 130 - 139MM HG: CPT | Performed by: NURSE PRACTITIONER

## 2025-09-02 PROCEDURE — 1159F MED LIST DOCD IN RCRD: CPT | Performed by: NURSE PRACTITIONER

## (undated) DEVICE — SYRINGE MED 3ML CLR PLAS STD N CTRL LUERLOCK TIP DISP

## (undated) DEVICE — MEDIA CONTRAST RX ISOVUE-300 61% 30ML VIALS

## (undated) DEVICE — TOTAL TRAY, DB, 100% SILI FOLEY, 16FR 10: Brand: MEDLINE

## (undated) DEVICE — 3M™ IOBAN™ 2 ANTIMICROBIAL INCISE DRAPE 6648EZ: Brand: IOBAN™ 2

## (undated) DEVICE — SPONGE LAP W18XL18IN WHT COT 4 PLY FLD STRUNG RADPQ DISP ST

## (undated) DEVICE — STERILE TOTAL KNEE DRAPE PACK: Brand: CARDINAL HEALTH

## (undated) DEVICE — Device: Brand: JELCO

## (undated) DEVICE — SUTURE STRATAFIX SZ 1 L14IN ABSRB VLT L36MM MO-4 TAPERPOINT SXPD2B400

## (undated) DEVICE — NEEDLE SPNL 22GA L3.5IN BLK HUB S STL REG WALL FIT STYL W/

## (undated) DEVICE — SUTURE MCRYL + SZ 3-0 L27IN ABSRB UD PS1 L24MM 3/8 CIR REV MCP936H

## (undated) DEVICE — DRAPE,U/ SHT,SPLIT,PLAS,STERIL: Brand: MEDLINE

## (undated) DEVICE — SUTURE FIBERWIRE SZ 2 W/ TAPERED NEEDLE BLUE L38IN NONABSORB BLU L26.5MM 1/2 CIRCLE AR7200

## (undated) DEVICE — 450 ML BOTTLE OF 0.05% CHLORHEXIDINE GLUCONATE IN 99.95% STERILE WATER FOR IRRIGATION, USP AND APPLICATOR.: Brand: IRRISEPT ANTIMICROBIAL WOUND LAVAGE

## (undated) DEVICE — STERILE POLYISOPRENE POWDER-FREE SURGICAL GLOVES: Brand: PROTEXIS

## (undated) DEVICE — STANDARD HYPODERMIC NEEDLE,POLYPROPYLENE HUB: Brand: MONOJECT

## (undated) DEVICE — KIT STEREOTAXIC POD DISPOSABLE IASSIST

## (undated) DEVICE — STERILE LATEX POWDER-FREE SURGICAL GLOVESWITH NITRILE COATING: Brand: PROTEXIS

## (undated) DEVICE — DRESSING CNTCT 4X12IN IS THERABOND 3D

## (undated) DEVICE — C-ARM: Brand: UNBRANDED

## (undated) DEVICE — CONTAINER,SPECIMEN,OR STERILE,4OZ: Brand: MEDLINE

## (undated) DEVICE — SET EXTN L7IN PRIMING VOL 0.5ML PRSS RATE STD BOR 1 REM

## (undated) DEVICE — PEN: MARKING STD 100/CS: Brand: MEDICAL ACTION INDUSTRIES

## (undated) DEVICE — SYRINGE MED 30ML STD CLR PLAS LUERLOCK TIP N CTRL DISP

## (undated) DEVICE — GLOVE ORANGE PI 8 1/2   MSG9085

## (undated) DEVICE — SYRINGE MED 10ML TRNSLUC BRL PLUNG BLK MRK POLYPR CTRL

## (undated) DEVICE — Device

## (undated) DEVICE — DISPOSABLE OR TOWEL: Brand: CARDINAL HEALTH

## (undated) DEVICE — PADDING UNDERCAST W4INXL4YD 100% COT CRIMPED FINISH WBRL II

## (undated) DEVICE — UNIVERSAL BLOCK TRAY: Brand: AVANOS*

## (undated) DEVICE — SUTURE MCRYL SZ 3-0 L27IN ABSRB UD L26MM SH 1/2 CIR Y416H

## (undated) DEVICE — DECANTER FLD 9IN ST BG FOR ASEP TRNSF OF FLD

## (undated) DEVICE — NAVIO FLAT MARKERS: Brand: NAVIO

## (undated) DEVICE — FAIRFIELD KNEE LF

## (undated) DEVICE — SYSTEM SKIN CLSR 22CM DERMBND PRINEO

## (undated) DEVICE — SUTURE VCRL + SZ 0 L18IN ABSRB UD L36MM CT-1 1/2 CIR VCP840D

## (undated) DEVICE — HANDPIECE SET WITH HIGH FLOW TIP AND SUCTION TUBE: Brand: INTERPULSE

## (undated) DEVICE — TOWEL,OR,DSP,ST,BLUE,STD,4/PK,20PK/CS: Brand: MEDLINE

## (undated) DEVICE — HOOD: Brand: FLYTE

## (undated) DEVICE — SHEET,DRAPE,53X77,STERILE: Brand: MEDLINE

## (undated) DEVICE — CHLORAPREP 26ML ORANGE

## (undated) DEVICE — GLOVE SURG SZ 8.5 L11.85IN FNGR THK9.8MIL STRW LTX POLYMER

## (undated) DEVICE — TOTAL BASIC PK

## (undated) DEVICE — YANKAUER OPEN TIP, NO VENT: Brand: ARGYLE

## (undated) DEVICE — Device: Brand: SIDEKICK

## (undated) DEVICE — PORT VLV 2 W NDL FREE SMRTSITE

## (undated) DEVICE — COVER,MAYO STAND,XL,STERILE: Brand: MEDLINE

## (undated) DEVICE — APPLICATOR MEDICATED 26 CC SOLUTION HI LT ORNG CHLORAPREP

## (undated) DEVICE — HOOD, PEEL-AWAY: Brand: FLYTE

## (undated) DEVICE — GOWN,AURORA,NONREINF,RAGLAN,XXL,STERILE: Brand: MEDLINE

## (undated) DEVICE — BLADE SAGITAL 18X90X1.27MM

## (undated) DEVICE — HYPODERMIC SAFETY NEEDLE: Brand: MAGELLAN

## (undated) DEVICE — SYRINGE MED 10ML LUERLOCK TIP W/O SFTY DISP

## (undated) DEVICE — DEVICE POS W4INXL5YD KNEE SURG FOAM PD SELF ADH WRP DEMAYO

## (undated) DEVICE — COMPONENT KNEE LOWER EXTREMITIES. ANCIL ZIRCONIUM NAVIO DISP

## (undated) DEVICE — SUTURE VCRL + SZ 2-0 L18IN ABSRB UD CT1 L36MM 1/2 CIR VCP839D

## (undated) DEVICE — GENESIS TROCHLEAR PIN 1/8 X 3
Type: IMPLANTABLE DEVICE | Site: KNEE | Status: NON-FUNCTIONAL
Brand: GENESIS
Removed: 2021-08-31

## (undated) DEVICE — Device: Brand: POWER-FLO®

## (undated) DEVICE — INTENDED FOR TISSUE SEPARATION, AND OTHER PROCEDURES THAT REQUIRE A SHARP SURGICAL BLADE TO PUNCTURE OR CUT.: Brand: BARD-PARKER ® STAINLESS STEEL BLADES